# Patient Record
Sex: FEMALE | Race: WHITE | NOT HISPANIC OR LATINO | Employment: FULL TIME | URBAN - METROPOLITAN AREA
[De-identification: names, ages, dates, MRNs, and addresses within clinical notes are randomized per-mention and may not be internally consistent; named-entity substitution may affect disease eponyms.]

---

## 2017-01-05 ENCOUNTER — OFFICE VISIT (OUTPATIENT)
Dept: FAMILY MEDICINE | Facility: CLINIC | Age: 57
End: 2017-01-05
Payer: COMMERCIAL

## 2017-01-05 VITALS
WEIGHT: 224 LBS | HEIGHT: 67 IN | TEMPERATURE: 97.4 F | SYSTOLIC BLOOD PRESSURE: 132 MMHG | DIASTOLIC BLOOD PRESSURE: 84 MMHG | HEART RATE: 59 BPM | BODY MASS INDEX: 35.16 KG/M2

## 2017-01-05 DIAGNOSIS — J01.01 ACUTE RECURRENT MAXILLARY SINUSITIS: ICD-10-CM

## 2017-01-05 DIAGNOSIS — L60.0 INGROWN NAIL: Primary | ICD-10-CM

## 2017-01-05 DIAGNOSIS — Z12.31 VISIT FOR SCREENING MAMMOGRAM: ICD-10-CM

## 2017-01-05 DIAGNOSIS — Z78.0 ASYMPTOMATIC POSTMENOPAUSAL STATUS: ICD-10-CM

## 2017-01-05 PROCEDURE — 11730 AVULSION NAIL PLATE SIMPLE 1: CPT | Performed by: FAMILY MEDICINE

## 2017-01-05 PROCEDURE — 99212 OFFICE O/P EST SF 10 MIN: CPT | Mod: 25 | Performed by: FAMILY MEDICINE

## 2017-01-05 NOTE — NURSING NOTE
"Chief Complaint   Patient presents with     Derm Problem     right toe      Sinus Problem       Initial /84 mmHg  Pulse 59  Temp(Src) 97.4  F (36.3  C) (Oral)  Ht 5' 7\" (1.702 m)  Wt 224 lb (101.606 kg)  BMI 35.08 kg/m2  Breastfeeding? No Estimated body mass index is 35.08 kg/(m^2) as calculated from the following:    Height as of this encounter: 5' 7\" (1.702 m).    Weight as of this encounter: 224 lb (101.606 kg).  BP completed using cuff size: large    Health maintenance- mammogram order in. Pt aware.    Esteban Soria CMA          "

## 2017-01-05 NOTE — PROGRESS NOTES
"  SUBJECTIVE:                                                    Lizzie Chow is a 56 year old female who presents to clinic today for the following health issues:    Right great toe, ingrown nail. Has been seen twice for this. First time, told to soak, which she has been doing. Second time, treated with abx, continue to soak. Now she is being seen for the 3rd time for same issue. Continued pain, very tender.     Sinus pressure, cough for past 3 weeks. Woke this AM feeling better. No fevers for past week. No ear pain, upper jaw pain, sore throat. Using saline washes and cough med recommended by Pharmacist.       Problem list and histories reviewed & adjusted, as indicated.  Additional history: none    Problem list, Medication list, Allergies, and Medical/Social/Surgical histories reviewed in EPIC and updated as appropriate.    ROS:  Constitutional, HEENT, cardiovascular, pulmonary, gi and gu systems are negative, except as otherwise noted.    OBJECTIVE:                                                    /84 mmHg  Pulse 59  Temp(Src) 97.4  F (36.3  C) (Oral)  Ht 5' 7\" (1.702 m)  Wt 224 lb (101.606 kg)  BMI 35.08 kg/m2  Breastfeeding? No  Body mass index is 35.08 kg/(m^2).  GENERAL: healthy, alert and no distress  EYES: Eyes grossly normal to inspection, conjunctivae and sclerae normal  HENT: ear canals and TM's normal, nose and mouth without ulcers or lesions, cobblestoning posterior pharynx, no maxillary sinus pressure to percussion  NECK: no adenopathy  RESP: lungs clear to auscultation - no rales, rhonchi or wheezes  CV: regular rate and rhythm, normal S1 S2, no S3 or S4, no murmur, click or rub  SKIN: swelling and erythema, right lateral periungual skin, no apparent pockets of pus    Diagnostic Test Results:  none     Procedure - ingrown toenail removal    Ring block with 0.25% bupivicaine, successful. Nail loosened with spatula tool, clipped approx 3 mm from periungual skin, pulled from nail matrix. " Minimal bleeding. Treated with bacitracin, wrapped with gauze and Coban. Tolerated well.        ASSESSMENT/PLAN:                                                      1. Ingrown nail - Discussed proper care following. Keep covered for next 5 days.   - REMOVAL OF NAIL PLATE SIMPLE SINGLE    2. Asymptomatic postmenopausal status  - DEXA HIP/PELVIS/SPINE - Future; Future    3. Visit for screening mammogram  - *MA Screening Digital Bilateral; Future    4. Acute recurrent maxillary sinusitis - Overall, feeling better. Continue saline washes and cough med for sleep.       Gladys Solis MD  [facility]

## 2017-01-27 DIAGNOSIS — E78.2 MIXED HYPERLIPIDEMIA: Primary | ICD-10-CM

## 2017-01-27 RX ORDER — ATORVASTATIN CALCIUM 80 MG/1
80 TABLET, FILM COATED ORAL DAILY
Qty: 90 TABLET | Refills: 3 | Status: SHIPPED | OUTPATIENT
Start: 2017-01-27 | End: 2018-04-30

## 2017-02-01 ENCOUNTER — TELEPHONE (OUTPATIENT)
Dept: FAMILY MEDICINE | Facility: CLINIC | Age: 57
End: 2017-02-01

## 2017-02-01 NOTE — TELEPHONE ENCOUNTER
Panel Management Review      Patient has the following on her problem list:     Diabetes    ASA: Passed    Last A1C  A1C      6.1   11/3/2016  A1C      6.5   6/27/2016  A1C      6.4   9/1/2015  A1C      6.0   9/18/2013  A1C tested: Passed    Last LDL:    CHOL      130   9/1/2015  HDL       46   9/1/2015  LDL       42   9/1/2015  TRIG      208   9/1/2015  CHOLHDLRATIO      2.8   9/1/2015  No results found for this basename: nhdl    Is the patient on a Statin? YES             Is the patient on Aspirin? YES    Medications     HMG CoA Reductase Inhibitors    atorvastatin (LIPITOR) 80 MG tablet    Salicylates    aspirin EC 81 MG EC tablet          Last three blood pressure readings:  BP Readings from Last 3 Encounters:   01/05/17 132/84   12/08/16 130/59   11/03/16 128/82       Date of last diabetes office visit: 10-17-16 DM education.     Tobacco History:     History   Smoking status     Former Smoker -- 0.50 packs/day     Types: Cigarettes     Quit date: 01/01/2005   Smokeless tobacco     Never Used     Comment: less that half a pack             Composite cancer screening  Chart review shows that this patient is due/due soon for the following Mammogram  Summary:    Patient is due/failing the following:   MAMMOGRAM    Action needed:   Mammogram needed    Type of outreach:    recent ov and pt was informed of  needs    Questions for provider review:    None                                                                                                                                    Esteban Soria [facility]           Chart routed to none .

## 2017-06-07 ENCOUNTER — TELEPHONE (OUTPATIENT)
Dept: FAMILY MEDICINE | Facility: CLINIC | Age: 57
End: 2017-06-07

## 2017-06-07 NOTE — LETTER
[facility]          [address] [place] [address]                                                                                                       [phone]      Lizzie AMINTA Claudine  [address] [facility] [address]      Dear Lizzie,    Our records indicate that you may be due for preventative health care services.  Please make an appointment or call to set up the following tests as recommended.  If you have already had this testing done at another clinic please contact us to help us update our records to reflect the preventative care that you have had.    Breast cancer screen (mammogram) - Recommended every 1-2 years for women age 50 and older. Mammograms help detect breast cancer, which is the most common cancer among women in the [place]. You may need to start having mammograms earlier and more often if you have had breast cancer, breast problems, or have a family history of breast cancer.                                                                                                                                      Thank you for choosing [facility]. We appreciate the opportunity to serve you and look forward to supporting your healthcare needs in the future.    If you have any questions or concerns, please contact us at [phone]      Sincerely,           Gladys Solis MD

## 2017-06-07 NOTE — TELEPHONE ENCOUNTER
Panel Management Review      Patient has the following on her problem list:     Diabetes    ASA: Passed    Last A1C  Lab Results   Component Value Date    A1C 6.1 11/03/2016    A1C 6.5 06/27/2016    A1C 6.4 09/01/2015    A1C 6.0 09/18/2013     A1C tested: Passed    Last LDL:    Lab Results   Component Value Date    CHOL 130 09/01/2015     Lab Results   Component Value Date    HDL 46 09/01/2015     Lab Results   Component Value Date    LDL 42 09/01/2015     Lab Results   Component Value Date    TRIG 208 09/01/2015     Lab Results   Component Value Date    CHOLHDLRATIO 2.8 09/01/2015     No results found for: NHDL    Is the patient on a Statin? YES             Is the patient on Aspirin? YES    Medications     HMG CoA Reductase Inhibitors    atorvastatin (LIPITOR) 80 MG tablet    Salicylates    aspirin EC 81 MG EC tablet          Last three blood pressure readings:  BP Readings from Last 3 Encounters:   01/05/17 132/84   12/08/16 130/59   11/03/16 128/82       Date of last diabetes office visit: 6-     Tobacco History:     History   Smoking Status     Former Smoker     Packs/day: 0.50     Types: Cigarettes     Quit date: 1/1/2005   Smokeless Tobacco     Never Used     Comment: less that half a pack           Composite cancer screening  Chart review shows that this patient is due/due soon for the following . Mammogram  Summary:    Patient is due/failing the following:    MAMMOGRAM    Action needed:   Schedule  mammogram    Type of outreach:    Sent letter. as reminder. Pt has orders in    Questions for provider review:    None                                                                                                                                    Esteban Soria [facility]           Chart routed to letter out .

## 2017-08-05 ENCOUNTER — TELEPHONE (OUTPATIENT)
Dept: FAMILY MEDICINE | Facility: CLINIC | Age: 57
End: 2017-08-05

## 2017-09-12 NOTE — PROGRESS NOTES
"  SUBJECTIVE:   Lizzie Chow is a 56 year old female who presents to clinic today for the following health issues:      Having bilateral hip pain, most bothersome overnight - the hip affected is the one she's been sleeping on. When she changes position, she then has pain on that side.     No injuries to the hips. No low back pain. No pelvic pain.    Has some pain with ascending and descending stairs, otherwise hips are non-bothersome.      She reports she is also more fatigued lately. She feels this is because she has not been sleeping well due to her hip pain. She reports no snoring or apnea events.      Is overdue for diabetes visit. When I asked her why she hasn't made it in, she replies \"I don't know\". She feels her low-carb diet has been stable. She is on an ACE inhibitor and statin. She is taking aspirin daily.    She reports difficulty with smoking cessation. She is successful for a few days and then start smoking again. She finds the genetics is helpful and plans try this in the near future again.      Problem list and histories reviewed & adjusted, as indicated.  Additional history: none    Patient Active Problem List   Diagnosis     Flat feet     Family history of coronary artery disease     Fatty liver     Plantar fasciitis     Hypertension goal BP (blood pressure) < 140/90     NSTEMI (non-ST elevated myocardial infarction) (H)     CAD (coronary artery disease)     Ischemic cardiomyopathy     Tobacco abuse     Hyperlipidemia with target LDL less than 100     Menopausal state     Type 2 diabetes mellitus without complication (H)     Type 2 diabetes, HbA1c goal < 7% (H)     Advanced directives, counseling/discussion     Benign essential hypertension     Acquired hypothyroidism     Past Surgical History:   Procedure Laterality Date     CARDIAC SURGERY      stent     COLONOSCOPY N/A 12/8/2016    Procedure: COLONOSCOPY;  Surgeon: Ranjith Serrano MD;  Location: [facility]     HERNIORRHAPHY VENTRAL  10/8/2013    " Procedure: HERNIORRHAPHY VENTRAL;  Ventral hernia repair with mesh;  Surgeon: Alice Cordova MD;  Location: [facility] OR     NO HISTORY OF SURGERY         Social History   Substance Use Topics     Smoking status: Former Smoker     Packs/day: 0.50     Types: Cigarettes     Quit date: 1/1/2005     Smokeless tobacco: Never Used      Comment: less that half a pack     Alcohol use 0.0 oz/week     0 Standard drinks or equivalent per week      Comment: one time a week     Family History   Problem Relation Age of Onset     Family History Negative Father      alive 74     Family History Negative Sister      Family History Negative Sister      Family History Negative Brother      Family History Negative Brother      leukemia at age 18     Lipids Mother      alive 73     Breast Cancer No family hx of      Cancer - colorectal No family hx of      C.A.D. Mother 65     at age 65, heart attack, s/p stents placement     Hypertension Mother      DIABETES Mother      Cardiovascular Mother      DIABETES Sister      Obesity Brother      Obesity Maternal Grandfather      Obesity Paternal Grandfather      Hypertension Maternal Grandfather      Hypertension Paternal Grandfather      C.A.D. Maternal Grandmother 56     fatal MI             Reviewed and updated as needed this visit by clinical staff     Reviewed and updated as needed this visit by Provider         ROS:  Constitutional, HEENT, cardiovascular, pulmonary, gi and gu systems are negative, except as otherwise noted.      OBJECTIVE:   /84 (BP Location: Right arm, Patient Position: Sitting, Cuff Size: Adult Large)  Pulse 66  Temp 98.2  F (36.8  C) (Oral)  Wt 223 lb 8 oz (101.4 kg)  SpO2 96%  Breastfeeding? No  BMI 35.01 kg/m2  Body mass index is 35.01 kg/(m^2).  GENERAL: healthy, alert and no distress  RESP: lungs clear to auscultation - no rales, rhonchi or wheezes  CV: regular rate and rhythm, normal S1 S2, no S3 or S4, no murmur, click or rub, no peripheral edema  and peripheral pulses strong  MS: has mild ttp over the greater trochanter of the hips bilaterally, normal ROM with no pain  NEURO: Normal strength and tone, mentation intact and speech normal  PSYCH: mentation appears normal, affect normal/bright    Diagnostic Test Results:  Results for orders placed or performed in visit on 09/19/17 (from the past 24 hour(s))   Hemoglobin A1c   Result Value Ref Range    Hemoglobin A1C 6.3 (H) 4.3 - 6.0 %       ASSESSMENT/PLAN:     1. Trochanteric bursitis of both hips - likely diagnosis based on history, XR, and exam. Suggested sports med consult to discuss bursa injection, as I feel it would be technically difficult and more appropriate through them.     2. Hip pain, bilateral   - SPORTS MEDICINE REFERRAL    3. Type 2 diabetes mellitus without complication, without long-term current use of insulin (H) - continues to have good control, will await rest of labs  - TSH with free T4 reflex  - Hemoglobin A1c  - Basic metabolic panel; Future  - Albumin Random Urine Quantitative with Creat Ratio  - Lipid panel reflex to direct LDL    4. Encounter for screening mammogram for breast cancer   - *MA Screening Digital Bilateral; Future    5. Tobacco abuse - continues to struggle with smoking cessation, she will try chantix again in the near future    6. Benign essential hypertension - well controlled, continue current    7. Acquired hypothyroidism - will recheck today as she has hx of poor follow up    8. Hyperlipidemia with target LDL less than 100 - continue statin    Gladys Solis MD  [facility]

## 2017-09-14 ENCOUNTER — TELEPHONE (OUTPATIENT)
Dept: FAMILY MEDICINE | Facility: CLINIC | Age: 57
End: 2017-09-14

## 2017-09-14 NOTE — TELEPHONE ENCOUNTER
Panel Management Review      Patient has the following on her problem list:     Diabetes    ASA: Passed    Last A1C  Lab Results   Component Value Date    A1C 6.1 11/03/2016    A1C 6.5 06/27/2016    A1C 6.4 09/01/2015    A1C 6.0 09/18/2013     A1C tested: Passed    Last LDL:    Lab Results   Component Value Date    CHOL 130 09/01/2015     Lab Results   Component Value Date    HDL 46 09/01/2015     Lab Results   Component Value Date    LDL 42 09/01/2015     Lab Results   Component Value Date    TRIG 208 09/01/2015     Lab Results   Component Value Date    CHOLHDLRATIO 2.8 09/01/2015     No results found for: NHDL    Is the patient on a Statin? NO             Is the patient on Aspirin? YES    Medications     HMG CoA Reductase Inhibitors    atorvastatin (LIPITOR) 80 MG tablet    Salicylates    aspirin EC 81 MG EC tablet          Last three blood pressure readings:  BP Readings from Last 3 Encounters:   01/05/17 132/84   12/08/16 130/59   11/03/16 128/82       Date of last diabetes office visit: pt has appointment scheduled for next week             Composite cancer screening  Chart review shows that this patient is due/due soon for the following Mammogram  Summary:    Patient is due/failing the following:   MAMMOGRAM    Action needed:   Not at this time. Pt has appointment scheduled next week                                                                                   Esteban Soria [facility]           Chart routed to none .

## 2017-09-19 ENCOUNTER — OFFICE VISIT (OUTPATIENT)
Dept: FAMILY MEDICINE | Facility: CLINIC | Age: 57
End: 2017-09-19
Payer: COMMERCIAL

## 2017-09-19 ENCOUNTER — RADIANT APPOINTMENT (OUTPATIENT)
Dept: GENERAL RADIOLOGY | Facility: CLINIC | Age: 57
End: 2017-09-19
Attending: FAMILY MEDICINE
Payer: COMMERCIAL

## 2017-09-19 VITALS
SYSTOLIC BLOOD PRESSURE: 128 MMHG | OXYGEN SATURATION: 96 % | WEIGHT: 223.5 LBS | TEMPERATURE: 98.2 F | DIASTOLIC BLOOD PRESSURE: 84 MMHG | BODY MASS INDEX: 35.01 KG/M2 | HEART RATE: 66 BPM

## 2017-09-19 DIAGNOSIS — M70.62 TROCHANTERIC BURSITIS OF BOTH HIPS: Primary | ICD-10-CM

## 2017-09-19 DIAGNOSIS — M70.61 TROCHANTERIC BURSITIS OF BOTH HIPS: Primary | ICD-10-CM

## 2017-09-19 DIAGNOSIS — M25.552 HIP PAIN, BILATERAL: ICD-10-CM

## 2017-09-19 DIAGNOSIS — M25.551 HIP PAIN, BILATERAL: ICD-10-CM

## 2017-09-19 DIAGNOSIS — E03.9 ACQUIRED HYPOTHYROIDISM: ICD-10-CM

## 2017-09-19 DIAGNOSIS — Z12.31 ENCOUNTER FOR SCREENING MAMMOGRAM FOR BREAST CANCER: ICD-10-CM

## 2017-09-19 DIAGNOSIS — Z72.0 TOBACCO ABUSE: ICD-10-CM

## 2017-09-19 DIAGNOSIS — E11.9 TYPE 2 DIABETES MELLITUS WITHOUT COMPLICATION, WITHOUT LONG-TERM CURRENT USE OF INSULIN (H): ICD-10-CM

## 2017-09-19 DIAGNOSIS — I10 BENIGN ESSENTIAL HYPERTENSION: ICD-10-CM

## 2017-09-19 DIAGNOSIS — E78.5 HYPERLIPIDEMIA WITH TARGET LDL LESS THAN 100: ICD-10-CM

## 2017-09-19 LAB
ANION GAP SERPL CALCULATED.3IONS-SCNC: 7 MMOL/L (ref 3–14)
BUN SERPL-MCNC: 15 MG/DL (ref 7–30)
CALCIUM SERPL-MCNC: 9.4 MG/DL (ref 8.5–10.1)
CHLORIDE SERPL-SCNC: 101 MMOL/L (ref 94–109)
CHOLEST SERPL-MCNC: 134 MG/DL
CO2 SERPL-SCNC: 31 MMOL/L (ref 20–32)
CREAT SERPL-MCNC: 0.6 MG/DL (ref 0.52–1.04)
CREAT UR-MCNC: 85 MG/DL
GFR SERPL CREATININE-BSD FRML MDRD: >90 ML/MIN/1.7M2
GLUCOSE SERPL-MCNC: 128 MG/DL (ref 70–99)
HBA1C MFR BLD: 6.3 % (ref 4.3–6)
HDLC SERPL-MCNC: 52 MG/DL
LDLC SERPL CALC-MCNC: 43 MG/DL
MICROALBUMIN UR-MCNC: 7 MG/L
MICROALBUMIN/CREAT UR: 8.11 MG/G CR (ref 0–25)
NONHDLC SERPL-MCNC: 82 MG/DL
POTASSIUM SERPL-SCNC: 4.4 MMOL/L (ref 3.4–5.3)
SODIUM SERPL-SCNC: 139 MMOL/L (ref 133–144)
TRIGL SERPL-MCNC: 194 MG/DL
TSH SERPL DL<=0.005 MIU/L-ACNC: 2.65 MU/L (ref 0.4–4)

## 2017-09-19 PROCEDURE — 83036 HEMOGLOBIN GLYCOSYLATED A1C: CPT | Performed by: FAMILY MEDICINE

## 2017-09-19 PROCEDURE — 84443 ASSAY THYROID STIM HORMONE: CPT | Performed by: FAMILY MEDICINE

## 2017-09-19 PROCEDURE — 82043 UR ALBUMIN QUANTITATIVE: CPT | Performed by: FAMILY MEDICINE

## 2017-09-19 PROCEDURE — 99214 OFFICE O/P EST MOD 30 MIN: CPT | Performed by: FAMILY MEDICINE

## 2017-09-19 PROCEDURE — 80048 BASIC METABOLIC PNL TOTAL CA: CPT | Performed by: FAMILY MEDICINE

## 2017-09-19 PROCEDURE — 80061 LIPID PANEL: CPT | Performed by: FAMILY MEDICINE

## 2017-09-19 PROCEDURE — 36415 COLL VENOUS BLD VENIPUNCTURE: CPT | Performed by: FAMILY MEDICINE

## 2017-09-19 PROCEDURE — 73522 X-RAY EXAM HIPS BI 3-4 VIEWS: CPT

## 2017-09-19 NOTE — MR AVS SNAPSHOT
After Visit Summary   9/19/2017    Lizzie Chow    MRN: [id number]           Patient Information     Date Of Birth          1960        Visit Information        Provider Department      9/19/2017 8:30 AM Gladys Solis MD [facility]        Today's Diagnoses     Type 2 diabetes mellitus without complication, without long-term current use of insulin (H)    -  1    Hip pain, bilateral        Encounter for screening mammogram for breast cancer           Follow-ups after your visit        Additional Services     SPORTS MEDICINE REFERRAL       Your provider has referred you to:  [facility]: [facility]  [phone]   [email or web address]    Please be aware that coverage of these services is subject to the terms and limitations of your health insurance plan.  Call member services at your health plan with any benefit or coverage questions.      Please bring the following to your appointment:    >>   Any x-rays, CTs or MRIs which have been performed.  Contact the facility where they were done to arrange for  prior to your scheduled appointment.    >>   List of current medications   >>   This referral request   >>   Any documents/labs given to you for this referral                  Future tests that were ordered for you today     Open Future Orders        Priority Expected Expires Ordered    *MA Screening Digital Bilateral Routine  9/19/2018 9/19/2017    Basic metabolic panel Routine  9/19/2018 9/19/2017            Who to contact     If you have questions or need follow up information about today's clinic visit or your schedule please contact [facility] directly at [phone].  Normal or non-critical lab and imaging results will be communicated to you by MyChart, letter or phone within 4 business days after the clinic has received the  results. If you do not hear from us within 7 days, please contact the clinic through [employer] or phone. If you have a critical or abnormal lab result, we will notify you by phone as soon as possible.  Submit refill requests through [employer] or call your pharmacy and they will forward the refill request to us. Please allow 3 business days for your refill to be completed.          Additional Information About Your Visit        [employer] Information     [employer] gives you secure access to your electronic health record. If you see a primary care provider, you can also send messages to your care team and make appointments. If you have questions, please call your primary care clinic.  If you do not have a primary care provider, please call [phone] and they will assist you.        Care EveryWhere ID     This is your Care EveryWhere ID. This could be used by other organizations to access your [place] medical records  [id number]        Your Vitals Were     Pulse Temperature Pulse Oximetry Breastfeeding? BMI (Body Mass Index)       66 98.2  F (36.8  C) (Oral) 96% No 35.01 kg/m2        Blood Pressure from Last 3 Encounters:   09/19/17 128/84   01/05/17 132/84   12/08/16 130/59    Weight from Last 3 Encounters:   09/19/17 223 lb 8 oz (101.4 kg)   01/05/17 224 lb (101.6 kg)   12/08/16 221 lb (100.2 kg)              We Performed the Following     Albumin Random Urine Quantitative with Creat Ratio     Hemoglobin A1c     Lipid panel reflex to direct LDL     SPORTS MEDICINE REFERRAL     TSH with free T4 reflex        Primary Care Provider Office Phone # Fax #    Gladys Julián Solis [phone] [address]  [facility] [address]        Equal Access to Services     CLIVE ADAMS : [place] Minor, wajacky dunaway, qaybta kaalmago enriquez. So [facility] [phone].    ATENCIÓN: Si habla español, tiene a mcdonald disposición servicios gratuitos de asistencia  lingüística. Bryan al [phone].    We comply with applicable federal civil rights laws and [place] laws. We do not discriminate on the basis of race, color, national origin, age, disability sex, sexual orientation or gender identity.            Thank you!     Thank you for choosing [facility]  for your care. Our goal is always to provide you with excellent care. Hearing back from our patients is one way we can continue to improve our services. Please take a few minutes to complete the written survey that you may receive in the mail after your visit with us. Thank you!             Your Updated Medication List - Protect others around you: Learn how to safely use, store and throw away your medicines at www.disposemymeds.org.          This list is accurate as of: 9/19/17  9:30 AM.  Always use your most recent med list.                   Brand Name Dispense Instructions for use Diagnosis    aspirin 81 MG EC tablet     30 tablet    Take 1 tablet (81 mg) by mouth daily    CAD (coronary artery disease)       atorvastatin 80 MG tablet    LIPITOR    90 tablet    Take 1 tablet (80 mg) by mouth daily    Mixed hyperlipidemia       carvedilol 6.25 MG tablet    COREG    180 tablet    Take 1 tablet (6.25 mg) by mouth 2 times daily    Coronary artery disease involving native coronary artery with unstable angina pectoris (H)       * CHANTIX STARTING MONTH PROSPER 0.5 MG X 11 & 1 MG X 42 tablet   Generic drug:  varenicline     53 tablet    As directed on starting pack.    Tobacco abuse       * varenicline 1 MG tablet    CHANTIX CONTINUING MONTH PROSPER    60 tablet    Take 1 tablet (1 mg) by mouth 2 times daily    Tobacco abuse       levothyroxine 25 MCG tablet    SYNTHROID/LEVOTHROID    90 tablet    Take 1 tablet (25 mcg) by mouth daily    Acquired hypothyroidism       lisinopril 2.5 MG tablet    PRINIVIL/Zestril    90 tablet    Take 1 tablet (2.5 mg) by mouth daily    Benign essential hypertension       nitroGLYcerin  0.4 MG sublingual tablet    NITROSTAT    25 tablet    Place 1 tablet (0.4 mg) under the tongue every 5 minutes as needed for chest pain    Coronary artery disease of native heart with stable angina pectoris, unspecified vessel or lesion type (H)       * Notice:  This list has 2 medication(s) that are the same as other medications prescribed for you. Read the directions carefully, and ask your doctor or other care provider to review them with you.

## 2017-09-21 ENCOUNTER — TELEPHONE (OUTPATIENT)
Dept: FAMILY MEDICINE | Facility: CLINIC | Age: 57
End: 2017-09-21

## 2017-10-27 DIAGNOSIS — I10 BENIGN ESSENTIAL HYPERTENSION: ICD-10-CM

## 2017-10-27 RX ORDER — LISINOPRIL 2.5 MG/1
2.5 TABLET ORAL DAILY
Qty: 90 TABLET | Refills: 3 | Status: SHIPPED | OUTPATIENT
Start: 2017-10-27 | End: 2018-10-15

## 2017-10-27 NOTE — TELEPHONE ENCOUNTER
Pending Prescriptions:                       Disp   Refills    lisinopril (PRINIVIL/ZESTRIL) 2.5 MG tabl*90 tab*3            Sig: Take 1 tablet (2.5 mg) by mouth daily          Last Written Prescription Date: 11/03/2016  Last Fill Quantity: 90, # refills: 3  Last Office Visit with [facility] or [facility] prescribing provider: 09/19/2017    Next 5 appointments (look out 90 days)     Dec 12, 2017  9:45 AM CST   Return Visit with Dereje Castillo MD   [facility] [place] ([facility])    [address] [place] [id number] [phone]                   Potassium   Date Value Ref Range Status   09/19/2017 4.4 3.4 - 5.3 mmol/L Final     Creatinine   Date Value Ref Range Status   09/19/2017 0.60 0.52 - 1.04 mg/dL Final     BP Readings from Last 3 Encounters:   09/19/17 128/84   01/05/17 132/84   12/08/16 130/59     Tank CABRERAT

## 2017-10-27 NOTE — TELEPHONE ENCOUNTER
Prescription approved per [facility] Refill Protocol.  Timmy Robles RN, BSN

## 2017-11-09 DIAGNOSIS — I21.4 NSTEMI (NON-ST ELEVATED MYOCARDIAL INFARCTION) (H): Primary | ICD-10-CM

## 2017-11-25 DIAGNOSIS — E03.9 ACQUIRED HYPOTHYROIDISM: ICD-10-CM

## 2017-11-27 RX ORDER — LEVOTHYROXINE SODIUM 25 UG/1
25 TABLET ORAL DAILY
Qty: 90 TABLET | Refills: 3 | Status: SHIPPED | OUTPATIENT
Start: 2017-11-27 | End: 2018-12-19

## 2017-12-12 ENCOUNTER — OFFICE VISIT (OUTPATIENT)
Dept: CARDIOLOGY | Facility: CLINIC | Age: 57
End: 2017-12-12
Attending: INTERNAL MEDICINE
Payer: COMMERCIAL

## 2017-12-12 VITALS
SYSTOLIC BLOOD PRESSURE: 132 MMHG | BODY MASS INDEX: 35.03 KG/M2 | HEIGHT: 67 IN | HEART RATE: 60 BPM | DIASTOLIC BLOOD PRESSURE: 84 MMHG | WEIGHT: 223.2 LBS

## 2017-12-12 DIAGNOSIS — I25.110 CORONARY ARTERY DISEASE INVOLVING NATIVE CORONARY ARTERY WITH UNSTABLE ANGINA PECTORIS (H): ICD-10-CM

## 2017-12-12 DIAGNOSIS — I25.10 CORONARY ARTERY DISEASE INVOLVING NATIVE CORONARY ARTERY OF NATIVE HEART WITHOUT ANGINA PECTORIS: ICD-10-CM

## 2017-12-12 DIAGNOSIS — I10 BENIGN ESSENTIAL HYPERTENSION: ICD-10-CM

## 2017-12-12 DIAGNOSIS — I10 HYPERTENSION GOAL BP (BLOOD PRESSURE) < 140/90: ICD-10-CM

## 2017-12-12 DIAGNOSIS — I25.5 ISCHEMIC CARDIOMYOPATHY: Primary | ICD-10-CM

## 2017-12-12 DIAGNOSIS — I21.4 NSTEMI (NON-ST ELEVATED MYOCARDIAL INFARCTION) (H): ICD-10-CM

## 2017-12-12 DIAGNOSIS — E78.5 HYPERLIPIDEMIA WITH TARGET LDL LESS THAN 100: ICD-10-CM

## 2017-12-12 PROCEDURE — 99214 OFFICE O/P EST MOD 30 MIN: CPT | Performed by: INTERNAL MEDICINE

## 2017-12-12 RX ORDER — CARVEDILOL 6.25 MG/1
6.25 TABLET ORAL 2 TIMES DAILY
Qty: 180 TABLET | Refills: 3 | Status: SHIPPED | OUTPATIENT
Start: 2017-12-12 | End: 2018-12-19

## 2017-12-12 NOTE — PROGRESS NOTES
HPI and Plan:   See dictation(#[id number])    Orders Placed This Encounter   Procedures     Follow-Up with Cardiologist       No orders of the defined types were placed in this encounter.      There are no discontinued medications.      Encounter Diagnoses   Name Primary?     NSTEMI (non-ST elevated myocardial infarction) (H)      Ischemic cardiomyopathy Yes     Hypertension goal BP (blood pressure) < 140/90      Hyperlipidemia with target LDL less than 100      Benign essential hypertension      Coronary artery disease involving native coronary artery of native heart without angina pectoris        CURRENT MEDICATIONS:  Current Outpatient Prescriptions   Medication Sig Dispense Refill     levothyroxine (SYNTHROID/LEVOTHROID) 25 MCG tablet Take 1 tablet (25 mcg) by mouth daily 90 tablet 3     lisinopril (PRINIVIL/ZESTRIL) 2.5 MG tablet Take 1 tablet (2.5 mg) by mouth daily 90 tablet 3     atorvastatin (LIPITOR) 80 MG tablet Take 1 tablet (80 mg) by mouth daily 90 tablet 3     carvedilol (COREG) 6.25 MG tablet Take 1 tablet (6.25 mg) by mouth 2 times daily 180 tablet 3     nitroglycerin (NITROSTAT) 0.4 MG SL tablet Place 1 tablet (0.4 mg) under the tongue every 5 minutes as needed for chest pain 25 tablet 1     varenicline (CHANTIX CONTINUING MONTH PROSPER) 1 MG tablet Take 1 tablet (1 mg) by mouth 2 times daily 60 tablet 3     CHANTIX STARTING MONTH PROSPER 0.5 MG X 11 & 1 MG X 42 tablet As directed on starting pack. 53 tablet 0     aspirin EC 81 MG EC tablet Take 1 tablet (81 mg) by mouth daily 30 tablet 11       ALLERGIES     Allergies   Allergen Reactions     No Known Drug Allergies        PAST MEDICAL HISTORY:  Past Medical History:   Diagnosis Date     CAD (coronary artery disease) 6/15/14    MI, stent LAD      Chronic pain     both feet intermittently     Edema     furosemide     Elevated glucose      Fatty liver disease, nonalcoholic      HTN (hypertension)      Hyperlipidemia      Ischemic cardiomyopathy     EF 40-45%      Lumbago      Obesity (BMI 30-39.9)      Plantar fasciitis      Stress incontinence      Tobacco abuse      Umbilical hernia     has had surgical consult       PAST SURGICAL HISTORY:  Past Surgical History:   Procedure Laterality Date     CARDIAC SURGERY      stent     COLONOSCOPY N/A 12/8/2016    Procedure: COLONOSCOPY;  Surgeon: Ranjith Serrano MD;  Location: [facility]     HERNIORRHAPHY VENTRAL  10/8/2013    Procedure: HERNIORRHAPHY VENTRAL;  Ventral hernia repair with mesh;  Surgeon: Alice Cordova MD;  Location: [facility] OR     NO HISTORY OF SURGERY         FAMILY HISTORY:  Family History   Problem Relation Age of Onset     Lipids Mother      alive 73     C.A.D. Mother 65     at age 65, heart attack, s/p stents placement     Hypertension Mother      DIABETES Mother      Cardiovascular Mother      Family History Negative Father      alive 74     C.A.D. Maternal Grandmother 56     fatal MI     Obesity Maternal Grandfather      Hypertension Maternal Grandfather      Obesity Paternal Grandfather      Hypertension Paternal Grandfather      Family History Negative Sister      Family History Negative Sister      Family History Negative Brother      Family History Negative Brother      leukemia at age 18     DIABETES Sister      Obesity Brother      Breast Cancer No family hx of      Cancer - colorectal No family hx of        SOCIAL HISTORY:  Social History     Social History     Marital status:      Spouse name: N/A     Number of children: 2     Years of education: N/A     Occupational History     manager [employer]     Social History Main Topics     Smoking status: Former Smoker     Packs/day: 0.50     Types: Cigarettes     Quit date: 1/1/2005     Smokeless tobacco: Never Used      Comment: less that half a pack     Alcohol use 0.0 oz/week     0 Standard drinks or equivalent per week      Comment: one time a week     Drug use: No     Sexual activity: Yes     Partners: Male      Comment:  " with vasectomy     Other Topics Concern     Parent/Sibling W/ Cabg, Mi Or Angioplasty Before 65f 55m? 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       Review of Systems:  Skin:  Negative       Eyes:  Positive for glasses driving  ENT:  Positive for nasal congestion    Respiratory:  Negative       Cardiovascular:  Negative      Gastroenterology: Negative      Genitourinary:  Negative      Musculoskeletal:  Positive for arthritis;joint pain    Neurologic:  Positive for numbness or tingling of feet    Psychiatric:  Negative      Heme/Lymph/Imm:  Negative      Endocrine:  Positive for thyroid disorder      Physical Exam:  Vitals: /84  Pulse 60  Ht 1.702 m (5' 7\")  Wt 101.2 kg (223 lb 3.2 oz)  BMI 34.96 kg/m2    Constitutional:           Skin:             Head:           Eyes:           Lymph:      ENT:           Neck:           Respiratory:            Cardiac:                                                           GI:           Extremities and Muscular Skeletal:                 Neurological:           Psych:             CC  Dereje Castillo MD  [address]  [place] [id number]                  "

## 2017-12-12 NOTE — PROGRESS NOTES
REASON FOR CARDIOLOGY VISIT:  Followup CAD.      HISTORY OF PRESENT ILLNESS:  Ms. Chow is a very pleasant 56-year-old female who had non-STEMI in 06/2014 and underwent drug-eluting stent PCI of proximal LAD with 3.5 x 18 mm Resolute drug-eluting stent.  RCA and circumflex were normal, LVEF was 40%-45% at that time with mid to distal anterior wall and apical wall motion abnormalities with subsequent echocardiogram showing improvement in LVEF to 55%.  She has other comorbidities of hypertension, dyslipidemia and history of tobacco abuse.  Today, she is coming for routine followup.  The patient tells me that she has not had any tobacco in the last 4 weeks.  She is using Chantix intermittently to help her stay away from tobacco.  This summer she did biking fairly often, 4 times a week for about an hour each time.  No chest discomfort or shortness of breath with physical activity.  Recently she was given a Fitbit by her daughter, and she is doing yoga again.  No symptoms either at rest or with physical activity.  Lipid panel checked in September this year shows LDL well controlled at 43, HDL is improved from 46 to 52.  Kidney functions were normal.        MEDICATIONS:  She is presently on:   1.  Baby aspirin   2.  Lipitor 80 mg daily.   3.  Lisinopril 2.5 mg daily.   4.  Carvedilol 6.25 mg b.i.d.      PHYSICAL EXAMINATION:   VITAL SIGNS:  Blood pressure 132/84, heart rate 60 regular, weight 223 pounds, BMI 35.03.   GENERAL:  The patient appears pleasant, comfortable.   NECK:  Normal JVP, no bruit.   CARDIOVASCULAR SYSTEM:  S1, S2 normal, no murmur, rub or gallop.   RESPIRATORY SYSTEM:  Clear to auscultation bilaterally.   GASTROINTESTINAL SYSTEM:  Abdomen soft, nontender.   EXTREMITIES:  No pitting pedal edema.   NEUROLOGICAL:  Alert, oriented x3.   PSYCHIATRIC:  Normal affect.   SKIN:  No obvious rash.   HEENT:  No pallor or icterus.      IMPRESSION AND PLAN:  A very pleasant 56-year-old female with history of CAD,  hypertension, dyslipidemia.  Overall cardiac status-wise she is doing well without any symptoms of angina or congestive heart failure.  Cardiac auscultation was benign today.  She is on appropriate CAD medical regimen therapy of aspirin, high-intensity statin, beta blocker and ACE inhibitor.  LDL and blood pressure are both well controlled.  I again had a long discussion with the patient regarding the importance of staying away from tobacco and commended her from not using any tobacco for the last 4 weeks.   If she continues to feel well, we can see her back in 1 year, sooner if any change in clinical status.         RAH CHOUDHARY MD             D: 2017 10:29   T: 2017 11:16   MT: DENA      Name:     PROMISE MAY   MRN:      [id number]        Account:      [id number]   :      1960           Service Date: 2017      Document: [id number]

## 2017-12-12 NOTE — MR AVS SNAPSHOT
After Visit Summary   12/12/2017    Lizzie Chow    MRN: [id number]           Patient Information     Date Of Birth          1960        Visit Information        Provider Department      12/12/2017 9:45 AM Dereje Castillo MD [facility]        Today's Diagnoses     Ischemic cardiomyopathy    -  1    NSTEMI (non-ST elevated myocardial infarction) (H)        Hypertension goal BP (blood pressure) < 140/90        Hyperlipidemia with target LDL less than 100        Benign essential hypertension        Coronary artery disease involving native coronary artery of native heart without angina pectoris           Follow-ups after your visit        Additional Services     Follow-Up with Cardiologist                 Future tests that were ordered for you today     Open Future Orders        Priority Expected Expires Ordered    Follow-Up with Cardiologist Routine 12/12/2018 12/13/2018 12/12/2017            Who to contact     If you have questions or need follow up information about today's clinic visit or your schedule please contact [facility] directly at [phone].  Normal or non-critical lab and imaging results will be communicated to you by ScaleArchart, letter or phone within 4 business days after the clinic has received the results. If you do not hear from us within 7 days, please contact the clinic through GinzaMetricst or phone. If you have a critical or abnormal lab result, we will notify you by phone as soon as possible.  Submit refill requests through [employer] Decor or call your pharmacy and they will forward the refill request to us. Please allow 3 business days for your refill to be completed.          Additional Information About Your Visit        MyChart Information     [employer] gives you secure access to your electronic health record. If you see a primary care provider, you can also send messages to your care team and make appointments.  "If you have questions, please call your primary care clinic.  If you do not have a primary care provider, please call [phone] and they will assist you.        Care EveryWhere ID     This is your Care EveryWhere ID. This could be used by other organizations to access your [facility] medical records  [id number]        Your Vitals Were     Pulse Height BMI (Body Mass Index)             60 1.702 m (5' 7\") 34.96 kg/m2          Blood Pressure from Last 3 Encounters:   12/12/17 132/84   09/19/17 128/84   01/05/17 132/84    Weight from Last 3 Encounters:   12/12/17 101.2 kg (223 lb 3.2 oz)   09/19/17 101.4 kg (223 lb 8 oz)   01/05/17 101.6 kg (224 lb)              We Performed the Following     Follow-Up with Cardiologist        Primary Care Provider Office Phone # Fax #    Gladys Solis [phone] [address] [facility] [address]        Equal Access to Services     [facility]: Hadii aad ku hadasho Soomaali, waaxda luqadaha, qaybta kaalmada adeegyada, waxay mady hayjohn rios . So [facility] [phone].    ATENCIÓN: Si habla español, tiene a mcdonald disposición servicios gratuitos de asistencia lingüística. Llame al [phone].    We comply with applicable federal civil rights laws and [place] laws. We do not discriminate on the basis of race, color, national origin, age, disability, sex, sexual orientation, or gender identity.            Thank you!     Thank you for choosing [facility]  for your care. Our goal is always to provide you with excellent care. Hearing back from our patients is one way we can continue to improve our services. Please take a few minutes to complete the written survey that you may receive in the mail after your visit with us. Thank you!             Your Updated Medication List - Protect others around you: Learn how to safely use, store and throw away your medicines at www.disposemymeds.org.          This " list is accurate as of: 12/12/17 10:20 AM.  Always use your most recent med list.                   Brand Name Dispense Instructions for use Diagnosis    aspirin 81 MG EC tablet     30 tablet    Take 1 tablet (81 mg) by mouth daily    CAD (coronary artery disease)       atorvastatin 80 MG tablet    LIPITOR    90 tablet    Take 1 tablet (80 mg) by mouth daily    Mixed hyperlipidemia       carvedilol 6.25 MG tablet    COREG    180 tablet    Take 1 tablet (6.25 mg) by mouth 2 times daily    Coronary artery disease involving native coronary artery with unstable angina pectoris (H)       * CHANTIX STARTING MONTH PROSPER 0.5 MG X 11 & 1 MG X 42 tablet   Generic drug:  varenicline     53 tablet    As directed on starting pack.    Tobacco abuse       * varenicline 1 MG tablet    CHANTIX CONTINUING MONTH PROSPER    60 tablet    Take 1 tablet (1 mg) by mouth 2 times daily    Tobacco abuse       levothyroxine 25 MCG tablet    SYNTHROID/LEVOTHROID    90 tablet    Take 1 tablet (25 mcg) by mouth daily    Acquired hypothyroidism       lisinopril 2.5 MG tablet    PRINIVIL/Zestril    90 tablet    Take 1 tablet (2.5 mg) by mouth daily    Benign essential hypertension       nitroGLYcerin 0.4 MG sublingual tablet    NITROSTAT    25 tablet    Place 1 tablet (0.4 mg) under the tongue every 5 minutes as needed for chest pain    Coronary artery disease of native heart with stable angina pectoris, unspecified vessel or lesion type (H)       * Notice:  This list has 2 medication(s) that are the same as other medications prescribed for you. Read the directions carefully, and ask your doctor or other care provider to review them with you.

## 2018-01-03 ENCOUNTER — TELEPHONE (OUTPATIENT)
Dept: FAMILY MEDICINE | Facility: CLINIC | Age: 58
End: 2018-01-03

## 2018-01-03 NOTE — TELEPHONE ENCOUNTER
Panel Management Review      Patient has the following on her problem list:     Diabetes    ASA: Passed    Last A1C  Lab Results   Component Value Date    A1C 6.3 09/19/2017    A1C 6.1 11/03/2016    A1C 6.5 06/27/2016    A1C 6.4 09/01/2015    A1C 6.0 09/18/2013     A1C tested: Passed    Last LDL:    Lab Results   Component Value Date    CHOL 134 09/19/2017     Lab Results   Component Value Date    HDL 52 09/19/2017     Lab Results   Component Value Date    LDL 43 09/19/2017     Lab Results   Component Value Date    TRIG 194 09/19/2017     Lab Results   Component Value Date    CHOLHDLRATIO 2.8 09/01/2015     Lab Results   Component Value Date    NHDL 82 09/19/2017       Is the patient on a Statin? YES             Is the patient on Aspirin? YES    Medications     HMG CoA Reductase Inhibitors    atorvastatin (LIPITOR) 80 MG tablet    Salicylates    aspirin EC 81 MG EC tablet          Last three blood pressure readings:  BP Readings from Last 3 Encounters:   12/12/17 132/84   09/19/17 128/84   01/05/17 132/84       Date of last diabetes office visit: 6-28-16     Tobacco History:     History   Smoking Status     Former Smoker     Packs/day: 0.50     Types: Cigarettes     Quit date: 1/1/2005   Smokeless Tobacco     Never Used     Comment: less that half a pack             Composite cancer screening  Chart review shows that this patient is due/due soon for the following Mammogram  Summary:    Patient is due/failing the following:   MAMMOGRAM    Action needed:   Mammogram needed.     Type of outreach:    Phone, spoke to patient.  states that she had mammogram scheduled couple months ago and had to cancel it. states she is at work and aware mammogram due    Questions for provider review:    None                                                                                                                                    Esteban Soria [facility]           Chart routed to none spoke to pt.  .

## 2018-03-23 ENCOUNTER — OFFICE VISIT (OUTPATIENT)
Dept: FAMILY MEDICINE | Facility: CLINIC | Age: 58
End: 2018-03-23
Payer: COMMERCIAL

## 2018-03-23 ENCOUNTER — TELEPHONE (OUTPATIENT)
Dept: FAMILY MEDICINE | Facility: CLINIC | Age: 58
End: 2018-03-23

## 2018-03-23 ENCOUNTER — MYC MEDICAL ADVICE (OUTPATIENT)
Dept: FAMILY MEDICINE | Facility: CLINIC | Age: 58
End: 2018-03-23

## 2018-03-23 VITALS
OXYGEN SATURATION: 96 % | HEART RATE: 68 BPM | HEIGHT: 67 IN | WEIGHT: 218 LBS | SYSTOLIC BLOOD PRESSURE: 134 MMHG | BODY MASS INDEX: 34.21 KG/M2 | DIASTOLIC BLOOD PRESSURE: 86 MMHG | TEMPERATURE: 98.1 F

## 2018-03-23 DIAGNOSIS — E11.9 TYPE 2 DIABETES MELLITUS WITHOUT COMPLICATION, WITHOUT LONG-TERM CURRENT USE OF INSULIN (H): ICD-10-CM

## 2018-03-23 DIAGNOSIS — I10 BENIGN ESSENTIAL HYPERTENSION: ICD-10-CM

## 2018-03-23 DIAGNOSIS — J20.9 ACUTE BRONCHITIS WITH SYMPTOMS > 10 DAYS: Primary | ICD-10-CM

## 2018-03-23 DIAGNOSIS — R05.9 COUGH: Primary | ICD-10-CM

## 2018-03-23 DIAGNOSIS — E78.5 HYPERLIPIDEMIA WITH TARGET LDL LESS THAN 100: ICD-10-CM

## 2018-03-23 LAB — HBA1C MFR BLD: 6.5 % (ref 4.3–6)

## 2018-03-23 PROCEDURE — 83036 HEMOGLOBIN GLYCOSYLATED A1C: CPT | Performed by: FAMILY MEDICINE

## 2018-03-23 PROCEDURE — 36415 COLL VENOUS BLD VENIPUNCTURE: CPT | Performed by: FAMILY MEDICINE

## 2018-03-23 PROCEDURE — 99214 OFFICE O/P EST MOD 30 MIN: CPT | Performed by: FAMILY MEDICINE

## 2018-03-23 RX ORDER — DOXYCYCLINE 100 MG/1
100 CAPSULE ORAL 2 TIMES DAILY
Qty: 14 CAPSULE | Refills: 0 | Status: SHIPPED | OUTPATIENT
Start: 2018-03-23 | End: 2019-02-19

## 2018-03-23 RX ORDER — CODEINE PHOSPHATE AND GUAIFENESIN 10; 100 MG/5ML; MG/5ML
1 SOLUTION ORAL EVERY 4 HOURS PRN
Qty: 120 ML | Refills: 0 | Status: SHIPPED | OUTPATIENT
Start: 2018-03-23 | End: 2018-07-18

## 2018-03-23 NOTE — NURSING NOTE
"Chief Complaint   Patient presents with     URI       Initial /86 (BP Location: Right arm, Patient Position: Sitting, Cuff Size: Adult Large)  Pulse 68  Temp 98.1  F (36.7  C) (Oral)  Ht 5' 7\" (1.702 m)  Wt 218 lb (98.9 kg)  SpO2 96%  Breastfeeding? No  BMI 34.14 kg/m2 Estimated body mass index is 34.14 kg/(m^2) as calculated from the following:    Height as of this encounter: 5' 7\" (1.702 m).    Weight as of this encounter: 218 lb (98.9 kg).  Medication Reconciliation: complete     Health maintenance- mammogram due pt reminded.    Esteban Soria CMA             "

## 2018-03-23 NOTE — PROGRESS NOTES
SUBJECTIVE:   Lizzie Chow is a 57 year old female who presents to clinic today for the following health issues:      RESPIRATORY SYMPTOMS      Duration: almost 2 weeks    Description  cough and foggy feeling, dizzy. Fatigue. Temp last week    Severity: moderate    Accompanying signs and symptoms: None    History (predisposing factors):  none    Precipitating or alleviating factors: hard to sleep    Therapies tried and outcome:  Delsym made her shaky      More SOB since illness. Some wheezing.         Diet controlled diabetes. Previously good control. Hasn't been checked in the past 6 months. Taking statin and ACEI.    Lab Results   Component Value Date    A1C 6.3 09/19/2017    A1C 6.1 11/03/2016    A1C 6.5 06/27/2016    A1C 6.4 09/01/2015    A1C 6.0 09/18/2013           Problem list and histories reviewed & adjusted, as indicated.  Additional history: none    Patient Active Problem List   Diagnosis     Former smoker     Flat feet     Family history of coronary artery disease     Fatty liver     Plantar fasciitis     Hypertension goal BP (blood pressure) < 140/90     NSTEMI (non-ST elevated myocardial infarction) (H)     CAD (coronary artery disease)     Ischemic cardiomyopathy     Hyperlipidemia with target LDL less than 100     Menopausal state     Type 2 diabetes mellitus without complication (H)     Type 2 diabetes, HbA1c goal < 7% (H)     Advanced directives, counseling/discussion     Benign essential hypertension     Acquired hypothyroidism     Past Surgical History:   Procedure Laterality Date     CARDIAC SURGERY      stent     COLONOSCOPY N/A 12/8/2016    Procedure: COLONOSCOPY;  Surgeon: Ranjith Serrano MD;  Location:  GI     HERNIORRHAPHY VENTRAL  10/8/2013    Procedure: HERNIORRHAPHY VENTRAL;  Ventral hernia repair with mesh;  Surgeon: Alice Cordova MD;  Location:  OR     NO HISTORY OF SURGERY         Social History   Substance Use Topics     Smoking status: Former Smoker     Packs/day:  "0.50     Types: Cigarettes     Quit date: 1/1/2005     Smokeless tobacco: Never Used      Comment: less that half a pack     Alcohol use 0.0 oz/week     0 Standard drinks or equivalent per week      Comment: one time a week     Family History   Problem Relation Age of Onset     Lipids Mother      alive 73     C.A.D. Mother 65     at age 65, heart attack, s/p stents placement     Hypertension Mother      DIABETES Mother      Cardiovascular Mother      Family History Negative Father      alive 74     C.A.D. Maternal Grandmother 56     fatal MI     Obesity Maternal Grandfather      Hypertension Maternal Grandfather      Obesity Paternal Grandfather      Hypertension Paternal Grandfather      Family History Negative Sister      Family History Negative Sister      Family History Negative Brother      Family History Negative Brother      leukemia at age 18     DIABETES Sister      Obesity Brother      Breast Cancer No family hx of      Cancer - colorectal No family hx of            Reviewed and updated as needed this visit by clinical staff       Reviewed and updated as needed this visit by Provider         ROS:  Constitutional, HEENT, cardiovascular, pulmonary, gi and gu systems are negative, except as otherwise noted.    OBJECTIVE:     /86 (BP Location: Right arm, Patient Position: Sitting, Cuff Size: Adult Large)  Pulse 68  Temp 98.1  F (36.7  C) (Oral)  Ht 5' 7\" (1.702 m)  Wt 218 lb (98.9 kg)  SpO2 96%  Breastfeeding? No  BMI 34.14 kg/m2  Body mass index is 34.14 kg/(m^2).  GENERAL: healthy, alert and no distress  HENT: serous effusion bilateral middle ears  NECK: no adenopathy  RESP: good air entry, diffuse wheezing with transmitted upper respiratory rhonchor  CV: regular rate and rhythm, normal S1 S2, no S3 or S4, no murmur, click or rub  PSYCH: mentation appears normal, affect normal/bright    Diagnostic Test Results:  Results for orders placed or performed in visit on 03/23/18   Hemoglobin A1c "   Result Value Ref Range    Hemoglobin A1C 6.5 (H) 4.3 - 6.0 %       ASSESSMENT/PLAN:     1. Acute bronchitis with symptoms > 10 days - will treat given length of symptoms and clinically significant lung exam today  - doxycycline monohydrate 100 MG capsule; Take 1 capsule (100 mg) by mouth 2 times daily for 7 days  Dispense: 14 capsule; Refill: 0  - guaiFENesin-codeine (ROBITUSSIN AC) 100-10 MG/5ML SOLN solution; Take 5 mLs by mouth every 4 hours as needed for cough  Dispense: 120 mL; Refill: 0    2. Type 2 diabetes mellitus without complication, without long-term current use of insulin (H) - control continues to worsen but still in a good range. Advised 3 month follow up, work on cutting sugars and carbs better.   - Hemoglobin A1c    3. Benign essential hypertension - in range, continue current    4. Hyperlipidemia with target LDL less than 100 - on statin, last checked in range  Lab Results   Component Value Date    CHOL 134 09/19/2017     Lab Results   Component Value Date    HDL 52 09/19/2017     Lab Results   Component Value Date    LDL 43 09/19/2017     Lab Results   Component Value Date    TRIG 194 09/19/2017     Lab Results   Component Value Date    CHOLHDLRATIO 2.8 09/01/2015       3 month DM visit    Gladys Solis MD  [facility]

## 2018-03-23 NOTE — MR AVS SNAPSHOT
After Visit Summary   3/23/2018    Lizzie Chow    MRN: [id number]           Patient Information     Date Of Birth          1960        Visit Information        Provider Department      3/23/2018 11:40 AM Gladys Solis MD [facility]        Today's Diagnoses     Acute bronchitis with symptoms > 10 days    -  1    Type 2 diabetes mellitus without complication, without long-term current use of insulin (H)        Benign essential hypertension        Hyperlipidemia with target LDL less than 100           Follow-ups after your visit        Who to contact     If you have questions or need follow up information about today's clinic visit or your schedule please contact [facility] directly at [phone].  Normal or non-critical lab and imaging results will be communicated to you by MyChart, letter or phone within 4 business days after the clinic has received the results. If you do not hear from us within 7 days, please contact the clinic through [email or web address] or phone. If you have a critical or abnormal lab result, we will notify you by phone as soon as possible.  Submit refill requests through [employer] or call your pharmacy and they will forward the refill request to us. Please allow 3 business days for your refill to be completed.          Additional Information About Your Visit        MyChart Information     [employer] gives you secure access to your electronic health record. If you see a primary care provider, you can also send messages to your care team and make appointments. If you have questions, please call your primary care clinic.  If you do not have a primary care provider, please call [phone] and they will assist you.        Care EveryWhere ID     This is your Care EveryWhere ID. This could be used by other organizations to access your [facility] medical records  [id number]        Your Vitals Were     Pulse Temperature Height Pulse Oximetry Breastfeeding?  "BMI (Body Mass Index)    68 98.1  F (36.7  C) (Oral) 5' 7\" (1.702 m) 96% No 34.14 kg/m2       Blood Pressure from Last 3 Encounters:   03/23/18 134/86   12/12/17 132/84   09/19/17 128/84    Weight from Last 3 Encounters:   03/23/18 218 lb (98.9 kg)   12/12/17 223 lb 3.2 oz (101.2 kg)   09/19/17 223 lb 8 oz (101.4 kg)              We Performed the Following     Hemoglobin A1c          Today's Medication Changes          These changes are accurate as of 3/23/18 12:20 PM.  If you have any questions, ask your nurse or doctor.               Start taking these medicines.        Dose/Directions    doxycycline monohydrate 100 MG capsule   Used for:  Acute bronchitis with symptoms > 10 days   Started by:  Gladys Solis MD        Dose:  100 mg   Take 1 capsule (100 mg) by mouth 2 times daily for 7 days   Quantity:  14 capsule   Refills:  0       guaiFENesin-codeine 100-10 MG/5ML Soln solution   Commonly known as:  ROBITUSSIN AC   Used for:  Acute bronchitis with symptoms > 10 days   Started by:  Gladys Solis MD        Dose:  1 tsp.   Take 5 mLs by mouth every 4 hours as needed for cough   Quantity:  120 mL   Refills:  0         Stop taking these medicines if you haven't already. Please contact your care team if you have questions.     CHANTIX STARTING MONTH PROSPER 0.5 MG X 11 & 1 MG X 42 tablet   Generic drug:  varenicline   Stopped by:  Gladys Solis MD           varenicline 1 MG tablet   Commonly known as:  CHANTIX CONTINUING MONTH PROSPER   Stopped by:  Gladys Solis MD                Where to get your medicines      These medications were sent to [facility] [id number] [place] [address] [facility] [address] [address] [facility], [facility] [address]     Phone:  [phone]     doxycycline monohydrate 100 MG capsule         Some of these will need a paper prescription and others can be bought over the counter.  Ask your nurse if you have questions.     Bring a paper " prescription for each of these medications     guaiFENesin-codeine 100-10 MG/5ML Soln solution                Primary Care Provider Office Phone # Fax #    Gladys Julián Solis [phone] [address]  [facility] [address]        Equal Access to Services     CLIVE ADAMS : Hadii aad ku hadasho Soomaali, waaxda luqadaha, qaybta kaalmada adeegyada, waxay idiin hayaan adeeg cami lasaira ralph. So [facility] [phone].    ATENCIÓN: Si habla español, tiene a mcdonald disposición servicios gratuitos de asistencia lingüística. [facility] [phone].    We comply with applicable federal civil rights laws and [place] laws. We do not discriminate on the basis of race, color, national origin, age, disability, sex, sexual orientation, or gender identity.            Thank you!     Thank you for choosing [facility]  for your care. Our goal is always to provide you with excellent care. Hearing back from our patients is one way we can continue to improve our services. Please take a few minutes to complete the written survey that you may receive in the mail after your visit with us. Thank you!             Your Updated Medication List - Protect others around you: Learn how to safely use, store and throw away your medicines at www.disposemymeds.org.          This list is accurate as of 3/23/18 12:20 PM.  Always use your most recent med list.                   Brand Name Dispense Instructions for use Diagnosis    aspirin 81 MG EC tablet     30 tablet    Take 1 tablet (81 mg) by mouth daily    CAD (coronary artery disease)       atorvastatin 80 MG tablet    LIPITOR    90 tablet    Take 1 tablet (80 mg) by mouth daily    Mixed hyperlipidemia       carvedilol 6.25 MG tablet    COREG    180 tablet    Take 1 tablet (6.25 mg) by mouth 2 times daily    Coronary artery disease involving native coronary artery with unstable angina pectoris (H)       doxycycline monohydrate 100 MG capsule     14 capsule    Take 1  capsule (100 mg) by mouth 2 times daily for 7 days    Acute bronchitis with symptoms > 10 days       guaiFENesin-codeine 100-10 MG/5ML Soln solution    ROBITUSSIN AC    120 mL    Take 5 mLs by mouth every 4 hours as needed for cough    Acute bronchitis with symptoms > 10 days       levothyroxine 25 MCG tablet    SYNTHROID/LEVOTHROID    90 tablet    Take 1 tablet (25 mcg) by mouth daily    Acquired hypothyroidism       lisinopril 2.5 MG tablet    PRINIVIL/Zestril    90 tablet    Take 1 tablet (2.5 mg) by mouth daily    Benign essential hypertension       nitroGLYcerin 0.4 MG sublingual tablet    NITROSTAT    25 tablet    Place 1 tablet (0.4 mg) under the tongue every 5 minutes as needed for chest pain    Coronary artery disease of native heart with stable angina pectoris, unspecified vessel or lesion type (H)

## 2018-03-27 RX ORDER — ALBUTEROL SULFATE 90 UG/1
2 AEROSOL, METERED RESPIRATORY (INHALATION) EVERY 4 HOURS PRN
Qty: 1 INHALER | Refills: 1 | Status: SHIPPED | OUTPATIENT
Start: 2018-03-27 | End: 2019-01-28

## 2018-04-30 DIAGNOSIS — E78.2 MIXED HYPERLIPIDEMIA: ICD-10-CM

## 2018-04-30 RX ORDER — ATORVASTATIN CALCIUM 80 MG/1
80 TABLET, FILM COATED ORAL DAILY
Qty: 90 TABLET | Refills: 3 | Status: SHIPPED | OUTPATIENT
Start: 2018-04-30 | End: 2019-03-20

## 2018-07-18 ENCOUNTER — OFFICE VISIT (OUTPATIENT)
Dept: FAMILY MEDICINE | Facility: CLINIC | Age: 58
End: 2018-07-18
Payer: COMMERCIAL

## 2018-07-18 VITALS
HEIGHT: 67 IN | WEIGHT: 223 LBS | SYSTOLIC BLOOD PRESSURE: 136 MMHG | BODY MASS INDEX: 35 KG/M2 | DIASTOLIC BLOOD PRESSURE: 86 MMHG | TEMPERATURE: 98 F | HEART RATE: 76 BPM

## 2018-07-18 DIAGNOSIS — M77.11 LATERAL EPICONDYLITIS OF RIGHT ELBOW: Primary | ICD-10-CM

## 2018-07-18 PROCEDURE — 99213 OFFICE O/P EST LOW 20 MIN: CPT | Performed by: FAMILY MEDICINE

## 2018-07-18 NOTE — NURSING NOTE
"  Chief Complaint   Patient presents with     Musculoskeletal Problem       Initial /86 (BP Location: Right arm, Patient Position: Sitting, Cuff Size: Adult Large)  Pulse 76  Temp 98  F (36.7  C) (Oral)  Ht 5' 7\" (1.702 m)  Wt 223 lb (101.2 kg)  Breastfeeding? No  BMI 34.93 kg/m2 Estimated body mass index is 34.93 kg/(m^2) as calculated from the following:    Height as of this encounter: 5' 7\" (1.702 m).    Weight as of this encounter: 223 lb (101.2 kg).  Medication Reconciliation: complete      Health Maintenance addressed:  Mammogram pt aware.    Esteban Soria CMA        "

## 2018-07-18 NOTE — PROGRESS NOTES
SUBJECTIVE:   Lizzie Chow is a 57 year old female who presents to clinic today for the following health issues:      Musculoskeletal problem/pain      Duration: one week     Description  Location: right arm and elbow    Intensity:  7/10    Accompanying signs and symptoms: some edema    History  Previous similar problem: no   Previous evaluation:  none    Precipitating or alleviating factors:  Trauma or overuse: no   Aggravating factors include: raising the arm. grasping    Therapies tried and outcome: ice and acetaminophen helps some      Uses her mouse right handed. No other activities that she can think of that could have caused pain. No injuries.       Problem list and histories reviewed & adjusted, as indicated.  Additional history: none    Patient Active Problem List   Diagnosis     Former smoker     Flat feet     Family history of coronary artery disease     Fatty liver     Plantar fasciitis     Hypertension goal BP (blood pressure) < 140/90     NSTEMI (non-ST elevated myocardial infarction) (H)     CAD (coronary artery disease)     Ischemic cardiomyopathy     Hyperlipidemia with target LDL less than 100     Menopausal state     Type 2 diabetes mellitus without complication (H)     Type 2 diabetes, HbA1c goal < 7% (H)     Advanced directives, counseling/discussion     Benign essential hypertension     Acquired hypothyroidism     Past Surgical History:   Procedure Laterality Date     CARDIAC SURGERY      stent     COLONOSCOPY N/A 12/8/2016    Procedure: COLONOSCOPY;  Surgeon: Ranjith Serrano MD;  Location:  GI     HERNIORRHAPHY VENTRAL  10/8/2013    Procedure: HERNIORRHAPHY VENTRAL;  Ventral hernia repair with mesh;  Surgeon: Alice Cordova MD;  Location: [facility] OR     NO HISTORY OF SURGERY         Social History   Substance Use Topics     Smoking status: Former Smoker     Packs/day: 0.50     Types: Cigarettes     Quit date: 1/1/2005     Smokeless tobacco: Never Used      Comment: less that half a  "pack     Alcohol use 0.0 oz/week     0 Standard drinks or equivalent per week      Comment: one time a week     Family History   Problem Relation Age of Onset     Lipids Mother      alive 73     C.A.D. Mother 65     at age 65, heart attack, s/p stents placement     Hypertension Mother      Diabetes Mother      Cardiovascular Mother      Family History Negative Father      alive 74     C.A.D. Maternal Grandmother 56     fatal MI     Obesity Maternal Grandfather      Hypertension Maternal Grandfather      Obesity Paternal Grandfather      Hypertension Paternal Grandfather      Family History Negative Sister      Family History Negative Sister      Family History Negative Brother      Family History Negative Brother      leukemia at age 18     Diabetes Sister      Obesity Brother      Breast Cancer No family hx of      Cancer - colorectal No family hx of            Reviewed and updated as needed this visit by clinical staff       Reviewed and updated as needed this visit by Provider         ROS:  Constitutional, HEENT, cardiovascular, pulmonary, gi and gu systems are negative, except as otherwise noted.    OBJECTIVE:     /86 (BP Location: Right arm, Patient Position: Sitting, Cuff Size: Adult Large)  Pulse 76  Temp 98  F (36.7  C) (Oral)  Ht 5' 7\" (1.702 m)  Wt 223 lb (101.2 kg)  Breastfeeding? No  BMI 34.93 kg/m2  Body mass index is 34.93 kg/(m^2).  GENERAL: healthy, alert and no distress  MSK: right arm - ttp over the lateral epicondyle, pain with resisted supination and wrist extension    Diagnostic Test Results:  none     ASSESSMENT/PLAN:     1. Lateral epicondylitis of right elbow - discussed diagnosis, reviewed getting a more ergonomic mouse for work, rest, wear strap, can consider PT if still having issues in the future.   - order for DME; Equipment being ordered: forearm strap  Dispense: 1 Units; Refill: 0    Gladys Solis MD  [facility]    "

## 2018-07-18 NOTE — MR AVS SNAPSHOT
After Visit Summary   7/18/2018    Lizzie Chow    MRN: [id number]           Patient Information     Date Of Birth          1960        Visit Information        Provider Department      7/18/2018 2:00 PM Gladys Solis MD [facility]        Today's Diagnoses     Lateral epicondylitis of right elbow    -  1       Follow-ups after your visit        Follow-up notes from your care team     Return in about 2 weeks (around 8/1/2018) for Diabetes Visit.      Your next 10 appointments already scheduled     Jul 25, 2018  7:20 AM CDT   [facility]ys [facility]is MD   [facility]    [address] [facility] [address] [phone]           Bring a current list of meds and any records pertaining to this visit. For Physicals, please bring immunization records and any forms needing to be filled out. Please arrive 10 minutes early to complete paperwork.              Who to contact     If you have questions or need follow up information about today's clinic visit or your schedule please contact [facility] directly at [phone].  Normal or non-critical lab and imaging results will be communicated to you by MyChart, letter or phone within 4 business days after the clinic has received the results. If you do not hear from us within 7 days, please contact the clinic through Launch[employer]art or phone. If you have a critical or abnormal lab result, we will notify you by phone as soon as possible.  Submit refill requests through [employer] or call your pharmacy and they will forward the refill request to us. Please allow 3 business days for your refill to be completed.          Additional Information About Your Visit        MyChart Information     [employer] gives you secure access to your electronic health record. If you see a primary care provider, you can also send messages to your care team and make appointments. If you have  "questions, please call your primary care clinic.  If you do not have a primary care provider, please call [phone] and they will assist you.        Care EveryWhere ID     This is your Care EveryWhere ID. This could be used by other organizations to access your [facility] medical records  [id number]        Your Vitals Were     Pulse Temperature Height Breastfeeding? BMI (Body Mass Index)       76 98  F (36.7  C) (Oral) 5' 7\" (1.702 m) No 34.93 kg/m2        Blood Pressure from Last 3 Encounters:   07/18/18 136/86   03/23/18 134/86   12/12/17 132/84    Weight from Last 3 Encounters:   07/18/18 223 lb (101.2 kg)   03/23/18 218 lb (98.9 kg)   12/12/17 223 lb 3.2 oz (101.2 kg)              Today, you had the following     No orders found for display         Today's Medication Changes          These changes are accurate as of 7/18/18  2:47 PM.  If you have any questions, ask your nurse or doctor.               Start taking these medicines.        Dose/Directions    order for DME   Used for:  Lateral epicondylitis of right elbow   Started by:  Gladys Solis MD        Equipment being ordered: forearm strap   Quantity:  1 Units   Refills:  0            Where to get your medicines      Some of these will need a paper prescription and others can be bought over the counter.  Ask your nurse if you have questions.     Bring a paper prescription for each of these medications     order for DME                Primary Care Provider Office Phone # Fax #    Gladys Solis [phone] [address]  [facility] [address]        Equal Access to Services     [facility] AH: Hadii calderon delgado hadasho Soshalom, waaxda luqadaha, qaybta kaalmada mago cervantes. So [facility] [phone].    ATENCIÓN: Si habla español, tiene a mcdonald disposición servicios gratuitos de asistencia lingüística. Llame al [phone].    We comply with applicable federal civil rights laws and [place] " laws. We do not discriminate on the basis of race, color, national origin, age, disability, sex, sexual orientation, or gender identity.            Thank you!     Thank you for choosing [facility]  for your care. Our goal is always to provide you with excellent care. Hearing back from our patients is one way we can continue to improve our services. Please take a few minutes to complete the written survey that you may receive in the mail after your visit with us. Thank you!             Your Updated Medication List - Protect others around you: Learn how to safely use, store and throw away your medicines at www.disposemymeds.org.          This list is accurate as of 7/18/18  2:47 PM.  Always use your most recent med list.                   Brand Name Dispense Instructions for use Diagnosis    albuterol 108 (90 Base) MCG/ACT Inhaler    PROAIR HFA/PROVENTIL HFA/VENTOLIN HFA    1 Inhaler    Inhale 2 puffs into the lungs every 4 hours as needed    Cough       aspirin 81 MG EC tablet     30 tablet    Take 1 tablet (81 mg) by mouth daily    CAD (coronary artery disease)       atorvastatin 80 MG tablet    LIPITOR    90 tablet    Take 1 tablet (80 mg) by mouth daily    Mixed hyperlipidemia       carvedilol 6.25 MG tablet    COREG    180 tablet    Take 1 tablet (6.25 mg) by mouth 2 times daily    Coronary artery disease involving native coronary artery with unstable angina pectoris (H)       levothyroxine 25 MCG tablet    SYNTHROID/LEVOTHROID    90 tablet    Take 1 tablet (25 mcg) by mouth daily    Acquired hypothyroidism       lisinopril 2.5 MG tablet    PRINIVIL/Zestril    90 tablet    Take 1 tablet (2.5 mg) by mouth daily    Benign essential hypertension       nitroGLYcerin 0.4 MG sublingual tablet    NITROSTAT    25 tablet    Place 1 tablet (0.4 mg) under the tongue every 5 minutes as needed for chest pain    Coronary artery disease of native heart with stable angina pectoris, unspecified vessel or lesion  type (H)       order for DME     1 Units    Equipment being ordered: forearm strap    Lateral epicondylitis of right elbow

## 2018-07-25 ENCOUNTER — OFFICE VISIT (OUTPATIENT)
Dept: FAMILY MEDICINE | Facility: CLINIC | Age: 58
End: 2018-07-25
Payer: COMMERCIAL

## 2018-07-25 VITALS
HEART RATE: 78 BPM | WEIGHT: 221 LBS | DIASTOLIC BLOOD PRESSURE: 86 MMHG | BODY MASS INDEX: 34.69 KG/M2 | HEIGHT: 67 IN | SYSTOLIC BLOOD PRESSURE: 130 MMHG | TEMPERATURE: 97.8 F

## 2018-07-25 DIAGNOSIS — I10 BENIGN ESSENTIAL HYPERTENSION: ICD-10-CM

## 2018-07-25 DIAGNOSIS — E78.5 HYPERLIPIDEMIA WITH TARGET LDL LESS THAN 100: ICD-10-CM

## 2018-07-25 DIAGNOSIS — E03.9 ACQUIRED HYPOTHYROIDISM: ICD-10-CM

## 2018-07-25 DIAGNOSIS — E11.9 TYPE 2 DIABETES MELLITUS WITHOUT COMPLICATION, WITHOUT LONG-TERM CURRENT USE OF INSULIN (H): Primary | ICD-10-CM

## 2018-07-25 LAB
ANION GAP SERPL CALCULATED.3IONS-SCNC: 8 MMOL/L (ref 3–14)
BUN SERPL-MCNC: 15 MG/DL (ref 7–30)
CALCIUM SERPL-MCNC: 8.8 MG/DL (ref 8.5–10.1)
CHLORIDE SERPL-SCNC: 105 MMOL/L (ref 94–109)
CHOLEST SERPL-MCNC: 129 MG/DL
CO2 SERPL-SCNC: 29 MMOL/L (ref 20–32)
CREAT SERPL-MCNC: 0.66 MG/DL (ref 0.52–1.04)
GFR SERPL CREATININE-BSD FRML MDRD: >90 ML/MIN/1.7M2
GLUCOSE SERPL-MCNC: 147 MG/DL (ref 70–99)
HBA1C MFR BLD: 6.5 % (ref 0–5.6)
HDLC SERPL-MCNC: 46 MG/DL
LDLC SERPL CALC-MCNC: 45 MG/DL
NONHDLC SERPL-MCNC: 83 MG/DL
POTASSIUM SERPL-SCNC: 4.3 MMOL/L (ref 3.4–5.3)
SODIUM SERPL-SCNC: 142 MMOL/L (ref 133–144)
T4 FREE SERPL-MCNC: 1 NG/DL (ref 0.76–1.46)
TRIGL SERPL-MCNC: 188 MG/DL
TSH SERPL DL<=0.005 MIU/L-ACNC: 4.46 MU/L (ref 0.4–4)

## 2018-07-25 PROCEDURE — 99207 C FOOT EXAM  NO CHARGE: CPT | Mod: 25 | Performed by: FAMILY MEDICINE

## 2018-07-25 PROCEDURE — 83036 HEMOGLOBIN GLYCOSYLATED A1C: CPT | Performed by: FAMILY MEDICINE

## 2018-07-25 PROCEDURE — 84443 ASSAY THYROID STIM HORMONE: CPT | Performed by: FAMILY MEDICINE

## 2018-07-25 PROCEDURE — 80061 LIPID PANEL: CPT | Performed by: FAMILY MEDICINE

## 2018-07-25 PROCEDURE — 80048 BASIC METABOLIC PNL TOTAL CA: CPT | Performed by: FAMILY MEDICINE

## 2018-07-25 PROCEDURE — 36415 COLL VENOUS BLD VENIPUNCTURE: CPT | Performed by: FAMILY MEDICINE

## 2018-07-25 PROCEDURE — 99214 OFFICE O/P EST MOD 30 MIN: CPT | Performed by: FAMILY MEDICINE

## 2018-07-25 PROCEDURE — 84439 ASSAY OF FREE THYROXINE: CPT | Performed by: FAMILY MEDICINE

## 2018-07-25 NOTE — PROGRESS NOTES
SUBJECTIVE:   Lizzie Chow is a 57 year old female who presents to clinic today for the following health issues:      Diabetes Follow-up      Patient is checking blood sugars: not at all    Diabetic concerns: None     Symptoms of hypoglycemia (low blood sugar): none     Paresthesias (numbness or burning in feet) or sores: No     Date of last diabetic eye exam:       Hyperlipidemia Follow-Up      Rate your low fat/cholesterol diet?: fair    Taking statin?  Yes, no muscle aches from statin    Other lipid medications/supplements?:  none    Hypertension Follow-up      Outpatient blood pressures are not being checked.    Low Salt Diet: low salt    BP Readings from Last 2 Encounters:   07/18/18 136/86   03/23/18 134/86     Hemoglobin A1C (%)   Date Value   03/23/2018 6.5 (H)   09/19/2017 6.3 (H)     LDL Cholesterol Calculated (mg/dL)   Date Value   09/19/2017 43   09/01/2015 42       Amount of exercise or physical activity: walking    Problems taking medications regularly: No    Medication side effects: none    Diet: low salt            Problem list and histories reviewed & adjusted, as indicated.  Additional history: as documented    Patient Active Problem List   Diagnosis     Former smoker     Flat feet     Family history of coronary artery disease     Fatty liver     Plantar fasciitis     Hypertension goal BP (blood pressure) < 140/90     NSTEMI (non-ST elevated myocardial infarction) (H)     CAD (coronary artery disease)     Ischemic cardiomyopathy     Hyperlipidemia with target LDL less than 100     Menopausal state     Type 2 diabetes, HbA1c goal < 7% (H)     Advanced directives, counseling/discussion     Benign essential hypertension     Acquired hypothyroidism     Type 2 diabetes mellitus without complication, without long-term current use of insulin (H)     Past Surgical History:   Procedure Laterality Date     CARDIAC SURGERY      stent     COLONOSCOPY N/A 12/8/2016    Procedure: COLONOSCOPY;  Surgeon:  "Ranjith Serrano MD;  Location: [facility] GI     HERNIORRHAPHY VENTRAL  10/8/2013    Procedure: HERNIORRHAPHY VENTRAL;  Ventral hernia repair with mesh;  Surgeon: Alice Cordova MD;  Location: [facility] OR     NO HISTORY OF SURGERY         Social History   Substance Use Topics     Smoking status: Former Smoker     Packs/day: 0.50     Types: Cigarettes     Quit date: 1/1/2005     Smokeless tobacco: Never Used      Comment: less that half a pack     Alcohol use 0.0 oz/week     0 Standard drinks or equivalent per week      Comment: one time a week     Family History   Problem Relation Age of Onset     Lipids Mother      alive 73     C.A.D. Mother 65     at age 65, heart attack, s/p stents placement     Hypertension Mother      Diabetes Mother      Cardiovascular Mother      Family History Negative Father      alive 74     C.A.D. Maternal Grandmother 56     fatal MI     Obesity Maternal Grandfather      Hypertension Maternal Grandfather      Obesity Paternal Grandfather      Hypertension Paternal Grandfather      Family History Negative Sister      Family History Negative Sister      Family History Negative Brother      Family History Negative Brother      leukemia at age 18     Diabetes Sister      Obesity Brother      Breast Cancer No family hx of      Cancer - colorectal No family hx of            Reviewed and updated as needed this visit by clinical staff       Reviewed and updated as needed this visit by Provider         ROS:  Constitutional, HEENT, cardiovascular, pulmonary, gi and gu systems are negative, except as otherwise noted.    OBJECTIVE:     /86 (BP Location: Right arm, Patient Position: Sitting, Cuff Size: Adult Large)  Pulse 78  Temp 97.8  F (36.6  C) (Oral)  Ht 5' 7\" (1.702 m)  Wt 221 lb (100.2 kg)  Breastfeeding? No  BMI 34.61 kg/m2  Body mass index is 34.61 kg/(m^2).  GENERAL: healthy, alert and no distress  RESP: lungs clear to auscultation - no rales, rhonchi or wheezes  CV: regular rate " and rhythm, normal S1 S2, no S3 or S4, no murmur, click or rub, no peripheral edema and peripheral pulses strong  Diabetic foot exam: normal DP and PT pulses, no trophic changes or ulcerative lesions and normal sensory exam    Diagnostic Test Results:  Results for orders placed or performed in visit on 07/25/18 (from the past 24 hour(s))   Hemoglobin A1c   Result Value Ref Range    Hemoglobin A1C 6.5 (H) 0 - 5.6 %       Lab Results   Component Value Date    A1C 6.5 07/25/2018    A1C 6.5 03/23/2018    A1C 6.3 09/19/2017    A1C 6.1 11/03/2016    A1C 6.5 06/27/2016       ASSESSMENT/PLAN:     1. Type 2 diabetes mellitus without complication, without long-term current use of insulin (H) - diet controlled, continues to be stable, recheck 3 months  - Hemoglobin A1c  - ADMIN 1st VACCINE  - TDAP, IM (10 - 64 YRS) - Adacel    2. Benign essential hypertension - in range, continue current  - Basic metabolic panel  (Ca, Cl, CO2, Creat, Gluc, K, Na, BUN)    3. Hyperlipidemia with target LDL less than 100 - previously stable, recheck today, continue statin  - Lipid panel reflex to direct LDL Fasting    4. Acquired hypothyroidism - recheck today  - TSH with free T4 reflex    Gladys Solis MD  [facility] [place]

## 2018-07-25 NOTE — NURSING NOTE
"  Chief Complaint   Patient presents with     Diabetes       Initial /86 (BP Location: Right arm, Patient Position: Sitting, Cuff Size: Adult Large)  Pulse 78  Temp 97.8  F (36.6  C) (Oral)  Ht 5' 7\" (1.702 m)  Wt 221 lb (100.2 kg)  Breastfeeding? No  BMI 34.61 kg/m2 Estimated body mass index is 34.61 kg/(m^2) as calculated from the following:    Height as of this encounter: 5' 7\" (1.702 m).    Weight as of this encounter: 221 lb (100.2 kg).  Medication Reconciliation: complete      Health Maintenance addressed:  Mammogram    Esteban Soria CMA          "

## 2018-08-31 ENCOUNTER — APPOINTMENT (OUTPATIENT)
Dept: GENERAL RADIOLOGY | Facility: CLINIC | Age: 58
End: 2018-08-31
Attending: EMERGENCY MEDICINE
Payer: COMMERCIAL

## 2018-08-31 ENCOUNTER — HOSPITAL ENCOUNTER (OUTPATIENT)
Facility: CLINIC | Age: 58
Setting detail: OBSERVATION
Discharge: HOME | End: 2018-09-01
Attending: EMERGENCY MEDICINE | Admitting: HOSPITALIST
Payer: COMMERCIAL

## 2018-08-31 DIAGNOSIS — I25.9 CHEST PAIN DUE TO MYOCARDIAL ISCHEMIA, UNSPECIFIED ISCHEMIC CHEST PAIN TYPE: ICD-10-CM

## 2018-08-31 LAB
ANION GAP SERPL CALCULATED.3IONS-SCNC: 4 MMOL/L (ref 3–14)
BASOPHILS # BLD AUTO: 0.1 10E9/L (ref 0–0.2)
BASOPHILS NFR BLD AUTO: 0.9 %
BUN SERPL-MCNC: 17 MG/DL (ref 7–30)
CALCIUM SERPL-MCNC: 9 MG/DL (ref 8.5–10.1)
CHLORIDE SERPL-SCNC: 103 MMOL/L (ref 94–109)
CO2 SERPL-SCNC: 32 MMOL/L (ref 20–32)
CREAT SERPL-MCNC: 0.58 MG/DL (ref 0.52–1.04)
DIFFERENTIAL METHOD BLD: NORMAL
EOSINOPHIL # BLD AUTO: 0.4 10E9/L (ref 0–0.7)
EOSINOPHIL NFR BLD AUTO: 4.9 %
ERYTHROCYTE [DISTWIDTH] IN BLOOD BY AUTOMATED COUNT: 12.8 % (ref 10–15)
GFR SERPL CREATININE-BSD FRML MDRD: >90 ML/MIN/1.7M2
GLUCOSE SERPL-MCNC: 111 MG/DL (ref 70–99)
HCT VFR BLD AUTO: 43.5 % (ref 35–47)
HGB BLD-MCNC: 14.4 G/DL (ref 11.7–15.7)
IMM GRANULOCYTES # BLD: 0 10E9/L (ref 0–0.4)
IMM GRANULOCYTES NFR BLD: 0.1 %
INTERPRETATION ECG - MUSE: NORMAL
LYMPHOCYTES # BLD AUTO: 2.3 10E9/L (ref 0.8–5.3)
LYMPHOCYTES NFR BLD AUTO: 29.2 %
MCH RBC QN AUTO: 30.9 PG (ref 26.5–33)
MCHC RBC AUTO-ENTMCNC: 33.1 G/DL (ref 31.5–36.5)
MCV RBC AUTO: 93 FL (ref 78–100)
MONOCYTES # BLD AUTO: 0.5 10E9/L (ref 0–1.3)
MONOCYTES NFR BLD AUTO: 6.5 %
NEUTROPHILS # BLD AUTO: 4.6 10E9/L (ref 1.6–8.3)
NEUTROPHILS NFR BLD AUTO: 58.4 %
NRBC # BLD AUTO: 0 10*3/UL
NRBC BLD AUTO-RTO: 0 /100
NT-PROBNP SERPL-MCNC: 54 PG/ML (ref 0–900)
PLATELET # BLD AUTO: 286 10E9/L (ref 150–450)
POTASSIUM SERPL-SCNC: 4 MMOL/L (ref 3.4–5.3)
RBC # BLD AUTO: 4.66 10E12/L (ref 3.8–5.2)
SODIUM SERPL-SCNC: 139 MMOL/L (ref 133–144)
TROPONIN I BLD-MCNC: 0 UG/L (ref 0–0.1)
TROPONIN I SERPL-MCNC: <0.015 UG/L (ref 0–0.04)
WBC # BLD AUTO: 7.8 10E9/L (ref 4–11)

## 2018-08-31 PROCEDURE — G0378 HOSPITAL OBSERVATION PER HR: HCPCS

## 2018-08-31 PROCEDURE — 84484 ASSAY OF TROPONIN QUANT: CPT

## 2018-08-31 PROCEDURE — 25000132 ZZH RX MED GY IP 250 OP 250 PS 637: Performed by: EMERGENCY MEDICINE

## 2018-08-31 PROCEDURE — 99220 ZZC INITIAL OBSERVATION CARE,LEVL III: CPT | Performed by: PHYSICIAN ASSISTANT

## 2018-08-31 PROCEDURE — 71046 X-RAY EXAM CHEST 2 VIEWS: CPT

## 2018-08-31 PROCEDURE — 83880 ASSAY OF NATRIURETIC PEPTIDE: CPT | Performed by: EMERGENCY MEDICINE

## 2018-08-31 PROCEDURE — 85025 COMPLETE CBC W/AUTO DIFF WBC: CPT | Performed by: EMERGENCY MEDICINE

## 2018-08-31 PROCEDURE — 84484 ASSAY OF TROPONIN QUANT: CPT | Mod: 91 | Performed by: PHYSICIAN ASSISTANT

## 2018-08-31 PROCEDURE — 93005 ELECTROCARDIOGRAM TRACING: CPT

## 2018-08-31 PROCEDURE — 36415 COLL VENOUS BLD VENIPUNCTURE: CPT | Performed by: PHYSICIAN ASSISTANT

## 2018-08-31 PROCEDURE — 80048 BASIC METABOLIC PNL TOTAL CA: CPT | Performed by: EMERGENCY MEDICINE

## 2018-08-31 PROCEDURE — 73560 X-RAY EXAM OF KNEE 1 OR 2: CPT | Mod: RT

## 2018-08-31 PROCEDURE — 99285 EMERGENCY DEPT VISIT HI MDM: CPT | Mod: 25

## 2018-08-31 RX ORDER — LEVOTHYROXINE SODIUM 25 UG/1
25 TABLET ORAL
Status: DISCONTINUED | OUTPATIENT
Start: 2018-09-01 | End: 2018-09-01 | Stop reason: HOSPADM

## 2018-08-31 RX ORDER — MORPHINE SULFATE 4 MG/ML
2 INJECTION, SOLUTION INTRAMUSCULAR; INTRAVENOUS
Status: DISCONTINUED | OUTPATIENT
Start: 2018-08-31 | End: 2018-09-01 | Stop reason: HOSPADM

## 2018-08-31 RX ORDER — ACETAMINOPHEN 325 MG/1
650 TABLET ORAL EVERY 4 HOURS PRN
Status: DISCONTINUED | OUTPATIENT
Start: 2018-08-31 | End: 2018-09-01 | Stop reason: HOSPADM

## 2018-08-31 RX ORDER — ACETAMINOPHEN 650 MG/1
650 SUPPOSITORY RECTAL EVERY 4 HOURS PRN
Status: DISCONTINUED | OUTPATIENT
Start: 2018-08-31 | End: 2018-09-01 | Stop reason: HOSPADM

## 2018-08-31 RX ORDER — ASPIRIN 81 MG/1
162 TABLET, CHEWABLE ORAL ONCE
Status: DISCONTINUED | OUTPATIENT
Start: 2018-08-31 | End: 2018-08-31

## 2018-08-31 RX ORDER — ASPIRIN 81 MG/1
81 TABLET ORAL DAILY
Status: DISCONTINUED | OUTPATIENT
Start: 2018-08-31 | End: 2018-08-31

## 2018-08-31 RX ORDER — NITROGLYCERIN 80 MG/1
1 PATCH TRANSDERMAL ONCE
Status: COMPLETED | OUTPATIENT
Start: 2018-08-31 | End: 2018-08-31

## 2018-08-31 RX ORDER — ATORVASTATIN CALCIUM 40 MG/1
80 TABLET, FILM COATED ORAL DAILY
Status: DISCONTINUED | OUTPATIENT
Start: 2018-09-01 | End: 2018-09-01 | Stop reason: HOSPADM

## 2018-08-31 RX ORDER — NITROGLYCERIN 0.4 MG/1
0.4 TABLET SUBLINGUAL EVERY 5 MIN PRN
Status: DISCONTINUED | OUTPATIENT
Start: 2018-08-31 | End: 2018-09-01 | Stop reason: HOSPADM

## 2018-08-31 RX ORDER — ASPIRIN 81 MG/1
81 TABLET ORAL DAILY
Status: DISCONTINUED | OUTPATIENT
Start: 2018-09-01 | End: 2018-09-01 | Stop reason: HOSPADM

## 2018-08-31 RX ORDER — ALUMINA, MAGNESIA, AND SIMETHICONE 2400; 2400; 240 MG/30ML; MG/30ML; MG/30ML
30 SUSPENSION ORAL EVERY 4 HOURS PRN
Status: DISCONTINUED | OUTPATIENT
Start: 2018-08-31 | End: 2018-09-01 | Stop reason: HOSPADM

## 2018-08-31 RX ORDER — LIDOCAINE 40 MG/G
CREAM TOPICAL
Status: DISCONTINUED | OUTPATIENT
Start: 2018-08-31 | End: 2018-09-01 | Stop reason: HOSPADM

## 2018-08-31 RX ORDER — VARENICLINE TARTRATE 1 MG/1
1 TABLET, FILM COATED ORAL DAILY
Status: DISCONTINUED | OUTPATIENT
Start: 2018-08-31 | End: 2018-09-01 | Stop reason: HOSPADM

## 2018-08-31 RX ORDER — LISINOPRIL 2.5 MG/1
2.5 TABLET ORAL DAILY
Status: DISCONTINUED | OUTPATIENT
Start: 2018-09-01 | End: 2018-09-01 | Stop reason: HOSPADM

## 2018-08-31 RX ORDER — ASPIRIN 81 MG/1
162 TABLET, CHEWABLE ORAL ONCE
Status: COMPLETED | OUTPATIENT
Start: 2018-08-31 | End: 2018-08-31

## 2018-08-31 RX ORDER — VARENICLINE TARTRATE 1 MG/1
1 TABLET, FILM COATED ORAL DAILY
COMMUNITY
End: 2019-02-19

## 2018-08-31 RX ORDER — NALOXONE HYDROCHLORIDE 0.4 MG/ML
.1-.4 INJECTION, SOLUTION INTRAMUSCULAR; INTRAVENOUS; SUBCUTANEOUS
Status: DISCONTINUED | OUTPATIENT
Start: 2018-08-31 | End: 2018-09-01 | Stop reason: HOSPADM

## 2018-08-31 RX ADMIN — ASPIRIN 81 MG 162 MG: 81 TABLET ORAL at 15:38

## 2018-08-31 RX ADMIN — NITROGLYCERIN 1 PATCH: 0.4 PATCH TRANSDERMAL at 15:39

## 2018-08-31 ASSESSMENT — ENCOUNTER SYMPTOMS
COUGH: 0
NAUSEA: 0
FEVER: 0
LIGHT-HEADEDNESS: 1
VOMITING: 0
SHORTNESS OF BREATH: 0
CHILLS: 0

## 2018-08-31 NOTE — PLAN OF CARE
Problem: Patient Care Overview  Goal: Plan of Care/Patient Progress Review  ROOM # 229     Living Situation (if not independent, order SW consult): lives with    Facility name:  : [facility] () [phone]    Activity level at baseline: independent   Activity level on admit: independent       Patient registered to observation; given Patient Bill of Rights; given the opportunity to ask questions about observation status and their plan of care.  Patient has been oriented to the observation room, bathroom and call light is in place.    Discussed discharge goals and expectations with patient/family.

## 2018-08-31 NOTE — ED NOTES
Patient sitting on edge of bed awaiting admission. No complaints.  inquiring about admission status.

## 2018-08-31 NOTE — ED NOTES
"[facility]  ED Nurse Handoff Report    Lizzie Chow is a 57 year old female   ED Chief complaint: Chest Pain and Dizziness  . ED Diagnosis:   Final diagnoses:   Chest pain due to myocardial ischemia, unspecified ischemic chest pain type (H)     Allergies:   Allergies   Allergen Reactions     No Known Drug Allergies        Code Status: Prior  Activity level - Baseline/Home:  Independent. Activity Level - Current:   Stand with Assist. Lift room needed: No. Bariatric: No   Needed: No   Isolation: No. Infection: Not Applicable.     Vital Signs:   Vitals:    08/31/18 1630 08/31/18 1645 08/31/18 1700 08/31/18 1715   BP: 147/83 158/84 148/86 (!) 153/95   Pulse:       Resp:       Temp:       TempSrc:       SpO2: 95% 94% 92% 98%   Weight:       Height:           Cardiac Rhythm:  ,   Cardiac  Cardiac Rhythm: Normal sinus rhythm  Pain level: 0-10 Pain Scale: 4 (states pain has decreased since arrival to ED )  Patient confused: No. Patient Falls Risk: No.   Elimination Status: Unable to void   Patient Report - Initial Complaint: Patient complaining of chest pain/tightness and dizziness since last night.       ABCs intact.  Alert and oriented x 3.  . Focused Assessment: Cardiac - Cardiac Comment: chest \"tightness\"  Review of Systems (Cardiac) - Cardiac Signs/Symptoms: chest pain; dizziness  Chest Pain Assessment - Rating (0-10): 5 Duration: 14 hours Associated Signs/Symptoms: dizziness Chest Pain Reproducible?: No  Cardiac Monitoring - EKG Monitoring: Yes Cardiac Regularity: Regular Cardiac Rhythm: NSR   Tests Performed: EKG, Labs, Xray. Abnormal Results:   Labs Ordered and Resulted from Time of ED Arrival Up to the Time of Departure from the ED   BASIC METABOLIC PANEL - Abnormal; Notable for the following:        Result Value    Glucose 111 (*)     All other components within normal limits   CBC WITH PLATELETS DIFFERENTIAL   NT PROBNP INPATIENT   PULSE OXIMETRY NURSING   CARDIAC CONTINUOUS MONITORING "   PERIPHERAL IV CATHETER   BLOOD PRESSURE   TROPONIN POCT     XR Chest 2 Views   Final Result   IMPRESSION: No acute consolidation.      ROSE MARY LINDA MD      XR Knee Right 1/2 Views   Final Result   IMPRESSION: No fracture identified.      ROSE MARY LINDA MD      .   Treatments provided: nitroglycerin, Aspirin  Family Comments:  at bedside   OBS brochure/video discussed/provided to patient:  Yes  ED Medications:   Medications   nitroGLYcerin (NITRODUR) patch REMOVAL (not administered)   aspirin chewable tablet 162 mg (162 mg Oral Given 8/31/18 7787)   nitroGLYcerin (NITRODUR) 0.4 MG/HR 24 hr patch 1 patch (1 patch Transdermal Given 8/31/18 8991)     Drips infusing:  No  For the majority of the shift, the patient's behavior Green. Interventions performed were NA.     Severe Sepsis OR Septic Shock Diagnosis Present: No      ED Nurse Name/Phone Number: Dipti ALLENJes Mukherjee,   5:29 PM    RECEIVING UNIT ED HANDOFF REVIEW    Above ED Nurse Handoff Report was reviewed: Yes  Reviewed by: Marsha Arnago on August 31, 2018 at 6:18 PM

## 2018-08-31 NOTE — ED TRIAGE NOTES
Patient complaining of chest pain/tightness and dizziness since last night.      ABCs intact.  Alert and oriented x 3.

## 2018-08-31 NOTE — IP AVS SNAPSHOT
[facility] Observation Department    [address]    [facility] [address]    Phone:  [phone]                                       After Visit Summary   8/31/2018    Lizzie Chow    MRN: [id number]           After Visit Summary Signature Page     I have received my discharge instructions, and my questions have been answered. I have discussed any challenges I see with this plan with the nurse or doctor.    ..........................................................................................................................................  Patient/Patient Representative Signature      ..........................................................................................................................................  Patient Representative Print Name and Relationship to Patient    ..................................................               ................................................  Date                                            Time    ..........................................................................................................................................  Reviewed by Signature/Title    ...................................................              ..............................................  Date                                                            Time          22EPIC Rev 08/18

## 2018-08-31 NOTE — ED PROVIDER NOTES
History     Chief Complaint:  Chest Pain     HPI   Lizzie Chow is a 57 year old female with a history of CAD s/p stent placement in 2014, hypertension, hyperlipidemia, type 2 diabetes, and ongoing tobacco use who presents for evaluation of chest pain. The patient reports she has had intermittent chest pain since yesterday afternoon that feels similar to her prior chest pain with her NSTEMI in 2014, though not quite as severe. She states symptoms started yesterday afternoon with pressure sensation in her chest while she was sitting at her desk at work. Pain was intermittent throughout the day yesterday, lasting a few minutes at a time, and persisted when she woke up this morning. Pain does not radiate. She states pain seems to be worse with exertion. She took 1 Nitroglycerin this morning with improvement in her pain. She reports some associated light-headedness. She was concerned due to ongoing pain today, prompting her visit to the ED.  notes that in 2014, she had 3 days of pain prior to seeking evaluation, and pain was more severe at that time, though a similar presentation to this. The patient initially reports that her pain is about a 5/10 in the ED, but was completely resolved by the end of my interview. She notes she fell a couple weeks ago and has some pain in her right knee, but she has been walking without difficulty.  She denies any nausea or vomiting. She denies any shortness of breath. She continues to smoke and is on Chantix to try and quit. She takes a baby aspirin daily.     The patients last echo was in December 2014.     Left heart cath 6/16/2014:  INDICATION FOR THE PROCEDURE:  Acute coronary syndrome with anterior   wall motion abnormality.   PROCEDURES:   1.  Left heart catheterization and left ventriculogram.   2.  Selective coronary artery angiography.   3.  Balloon angioplasty and stenting of a proximal LAD stenosis using   a 3.0 balloon, a 3.5 x 18 mm Resolute drug-eluting stent with    post-stent dilatation up to 16 atmospheres using a 3.75 noncompliant   balloon from an initial 99% GUILLERMINA grade 3 flow stenosis to a residual   of 0%.     .   FINDINGS:   1.  The left ventricular end-diastolic pressure was around 30 mmHg.    There was no gradient across the aortic valve on pullback.   2.  Left ventriculogram revealed an ejection fraction of around 40%   with hypokinesia of the mid to distal anterior wall, apical wall and   inferior apical wall.  No significant mitral insufficiency was seen.         3.  The right coronary artery was dominant and angiographically   smooth.   4.  The left main and circumflex artery were angiographically smooth.    The proximal LAD just before the major septal  had a 99%   stenosis that was treated in the fashion mentioned above.  There was,   however, GUILLERMINA grade 3 flow distally.  The residual stenosis was 0%   with good antegrade flow and no hangup of contrast.       CONCLUSIONS:  Successful intervention to a severe proximal LAD   stenosis with no angiographic stenosis elsewhere.       DISCUSSION:  The patient has a drug-eluting stent and should remain   on Plavix uninterrupted for at least one year with usual risk   modification.     CARDIAC RISK FACTORS:  Sex:    Female  Tobacco:   Yes  Hypertension:   Yes  Hyperlipidemia:  Yes  Diabetes:   Yes  Family History:  No (mother had stents placed at age 65)  Personal History: Yes    Allergies:  No known drug allergies     Medications:    albuterol (PROAIR HFA/PROVENTIL HFA/VENTOLIN HFA) 108 (90 BASE) MCG/ACT Inhaler  aspirin EC 81 MG EC tablet  atorvastatin (LIPITOR) 80 MG tablet  carvedilol (COREG) 6.25 MG tablet  levothyroxine (SYNTHROID/LEVOTHROID) 25 MCG tablet  lisinopril (PRINIVIL/ZESTRIL) 2.5 MG tablet  nitroglycerin (NITROSTAT) 0.4 MG SL tablet  Chantix    Past Medical History:    CAD with stent placement in 2014  Hypertension  Hyperlipidemia  Ischemic cardiomyopathy  Type 2 diabetes  Fatty liver  "disease  Edema  Chronic pain  Ischemic cardiomyopathy  Lumbago  Plantar fascitis  Stress incontinence  Tobacco abuse  NSTEMI    Past Surgical History:    LAD stent 2014  Ventral herniorrhaphy    Family History:    Hyperlipidemia  CAD, mother at age 65, s/p stent placement  Hypertension  Diabetes  Leukemia    Social History:  Smoking status: Patient continues to smoke.   Alcohol use: Occasional   Presents to the ED with her .   Marital Status:   [2]     Review of Systems   Constitutional: Negative for chills and fever.   Respiratory: Negative for cough and shortness of breath.    Cardiovascular: Positive for chest pain.   Gastrointestinal: Negative for nausea and vomiting.   Neurological: Positive for light-headedness.   All other systems reviewed and are negative.      Physical Exam     Patient Vitals for the past 24 hrs:   BP Temp Temp src Pulse Heart Rate Resp SpO2 Height Weight   08/31/18 1825 115/79 - - - 73 - 98 % - -   08/31/18 1720 157/89 - - - 70 - 96 % - -   08/31/18 1715 (!) 153/95 - - - 72 - 98 % - -   08/31/18 1710 (!) 150/91 - - - 74 - 95 % - -   08/31/18 1705 149/88 - - - 69 - 93 % - -   08/31/18 1621 140/86 - - - 64 - 94 % - -   08/31/18 1555 (!) 159/127 - - - - - 96 % - -   08/31/18 1554 - - - - 67 - 96 % - -   08/31/18 1545 167/88 - - - 69 - 94 % - -   08/31/18 1530 (!) 163/98 - - - 67 - 95 % - -   08/31/18 1514 149/89 - - 66 - - 95 % - -   08/31/18 1453 - - - - - - 96 % - -   08/31/18 1440 147/84 98.5  F (36.9  C) Temporal 78 - 16 95 % 1.676 m (5' 6\") 100.2 kg (221 lb)       Physical Exam  General: Alert, well appearing.   HEENT:   The scalp and head appear normal    The pupils are equal, round, and reactive to light    Extraocular muscles are intact.    The nose is normal.    The oropharynx is normal.      Uvula is in the midline.    Neck:  Normal range of motion.    Lungs:  Clear.      No rales, no wheezing.      There is no tachypnea.  Non-labored.  Cardiac: Regular rate.  "     Normal S1 and S2.      No pathological murmur/rub      Chest wall non-tender.   Abdomen: Soft. No distension, no localized tenderness or rebound.  MS:  Normal tone. Normal movement of all extremities.     Peripheral soft tissue swelling adjacent to the right patella. No effusion.   Neurologic:     Normal mentation.  No cranial nerve deficits.  No focal motor or sensory                            changes.      Speech normal.  Psych:  Awake.     Alert.      Normal affect.      Appropriate interactions.  Skin:  No rash.      No lesions.    Emergency Department Course   ECG (14:45:01):  Rate 74 bpm. NV interval 180. QRS duration 94. QT/QTc 410/455. P-R-T axes 60 21 31. Normal sinus rhythm. Normal ECG   Interpreted at 1450 by Armando Jett MD.    Imaging:  Radiographic findings were communicated with the patient who voiced understanding of the findings.    XR Chest 2 views  No acute consolidation.  As read by Radiology.    XR Right Knee   No fracture identified.  As read by Radiology.    Laboratory:  Troponin POCT (1516) 0.00  CBC: WNL (WBC 7.8, HGB 14.4, )  BMP: Glucose 111(H), o/w WNL (Creatinine 0.58)  BNP: 54    Interventions:  1538: Aspirin 162 mg oral  1539: nitro paste    Emergency Department Course:  Past medical records, nursing notes, and vitals reviewed.  1521: I performed an exam of the patient and obtained history, as documented above.  IV inserted and blood drawn. The patient was placed on continuous cardiac monitoring and pulse oximetry.  ECG obtained, results above.   The patient was sent for a chest x-ray while in the emergency department, findings above.    1723: Discussed the case with MARIALUISA Andrade on call for the hospitalist service who accepts the patient for admission.    1743: I spoke to Dr. Garo Dsouza on call for cardiology. Agrees with plan for no heparin.     Findings and plan explained to the Patient who consents to admission.   Discussed the patient with Ema Miller, who  will admit the patient to an obs bed for further monitoring, evaluation, and treatment.     HEART Score  Background  Calculates the overall risk of adverse event in patient's presenting with chest pain.  Based on 5 criteria (each assigned 0-2 points) including suspiciousness of history, EKG, age, risk factors and troponin.    Data  57 year old female  has Former smoker; Flat feet; Family history of coronary artery disease; Fatty liver; Plantar fasciitis; Hypertension goal BP (blood pressure) < 140/90; NSTEMI (non-ST elevated myocardial infarction) (H); CAD (coronary artery disease); Ischemic cardiomyopathy; Hyperlipidemia with target LDL less than 100; Menopausal state; Type 2 diabetes, HbA1c goal < 7% (H); Advanced directives, counseling/discussion; Benign essential hypertension; Acquired hypothyroidism; and Type 2 diabetes mellitus without complication, without long-term current use of insulin (H) on her problem list.   reports that she quit smoking about 13 years ago. She has never used smokeless tobacco.  family history includes C.A.D. (age of onset: 56) in her maternal grandmother; C.A.D. (age of onset: 65) in her mother; Cardiovascular in her mother; Diabetes in her mother and sister; Family History Negative in her brother, brother, father, sister, and sister; Hypertension in her maternal grandfather, mother, and paternal grandfather; Lipids in her mother; Obesity in her brother, maternal grandfather, and paternal grandfather. There is no history of Breast Cancer or Cancer - colorectal.  Lab Results   Component Value Date    TROP  11/03/2015     <0.015  The 99th percentile for upper reference range is 0.045 ug/L.  Troponin values in   the range of 0.045 - 0.120 ug/L may be associated with risks of adverse   clinical events.       Criteria   0-2 points for each of 5 items (maximum of 10 points):  Score 1- History moderately suspicious for coronary syndrome  Score 0- EKG Normal  Score 1- Age 45 to 65 years  old  Score 2- Three or more risk factors for or history of atherosclerotic disease  Score 0- Within normal limits for troponin levels  Interpretation  Risk of adverse outcome  Heart Score: 4  Total Score 4-6- Adverse Outcome Risk 20.3% - Supports admission with standard rule-out management -serial troponins and stress testing    Impression & Plan      Medical Decision Making:  Lizzie Chow is a 57 year old female with a history of coronary artery disease and stent placement who presents with similar symptoms of when she had angina previously. The patient says the pain would come and go. It first started at work with exertion. She felt slightly light-headed with it. It was 5/10 when we went to see her, by the end of our discussion it had resolved with rest.     The patient continues to smoke. She is being treated for hypertension, hyperlipidemia, and type 2 diabetes, and previous history.     Currently the patient is pain free with a non-diagnostic ECG, normal first troponin, but a HEART score of 4.     Since this pain is reminiscent of what she has had previously but not as severe, I am going to admit her for unstable angina. She did receive 2 baby Aspirin here. Her blood pressure is well controlled. I am not going to heparinize at this point and have not started any nitroglycerin due to her blood pressure being well controlled. I discussed with Dr. Dsouza of cardiology who has reviewed her ECGs and prior angiogram and agrees on holding on Heparin for now.     She will be admitted to cardiac observation. She said that she is able to ambulate and do a treadmill stress test despite some mild bursitis of her right knee. I did do an x-ray because she had fallen a few weeks ago to make sure there was not a fractured patella which there was not. There no was no effusion. She can flex and extend and again she says she can walk without pain on it.     Diagnosis:    ICD-10-CM   1. Chest pain due to myocardial ischemia,  unspecified ischemic chest pain type (H) I20.9     Disposition: Admit obs    Dia Arechigamore  8/31/2018   [facility] EMERGENCY DEPARTMENT    I, Dia Benja, am serving as a scribe at 3:21 PM on 8/31/2018 to document services personally performed by Armando Jett MD based on my observations and the provider's statements to me.        Armando Jett MD  08/31/18 2859

## 2018-08-31 NOTE — IP AVS SNAPSHOT
MRN:[id number]                      After Visit Summary   8/31/2018    Lizzie Chow    MRN: [id number]           Thank you!     Thank you for choosing [facility] for your care. Our goal is always to provide you with excellent care. Hearing back from our patients is one way we can continue to improve our services. Please take a few minutes to complete the written survey that you may receive in the mail after you visit. If you would like to speak to someone directly about your visit please contact Patient Relations at [phone]. Thank you!          Patient Information     Date Of Birth          1960        About your hospital stay     You were admitted on:  August 31, 2018 You last received care in the:  [facility] Observation Department    You were discharged on:  September 1, 2018        Reason for your hospital stay       You were admitted to the observation unit for chest pain. Your heart was monitored overnight with no abnormal findings. Your cardiac enzymes were negative for heart attack. You had an exercise stress test that was negative for heart disease so we do not believe your chest pain was related to your heart. Other possibilities include reflux, stress, and musculoskeletal strain. We recommend you follow up with your primary doctor if you continue to have pain and keep scheduled follow up with your cardiologist this fall.                  Who to Call     For medical emergencies, please call 911.  For non-urgent questions about your medical care, please call your primary care provider or clinic, [phone]          Attending Provider     Provider Specialty    Armando Jett MD Emergency Medicine    Gabe Van MD Internal Medicine       Primary Care Provider Office Phone # Fax #    Gladys Julián Solis [phone]      After Care Instructions     Activity       Your activity upon discharge: activity as tolerated             "Diet       Follow this diet upon discharge: Orders Placed This Encounter      Low Saturated Fat Na <2400 mg            Discharge Instructions       Recommend you keep track of any recurrent symptoms and in what context to discuss in follow up with your primary care provider.                  Follow-up Appointments     Follow-up and recommended labs and tests        Follow up with primary care provider, Gladys Solis, within 7 days for hospital follow- up.  No follow up labs or test are needed.                  Your next 10 appointments already scheduled     Oct 24, 2018  7:20 AM CDT   Office Visit with Gladys Solis MD   [facility] ([facility])    [address] [address] [phone]           Bring a current list of meds and any records pertaining to this visit. For Physicals, please bring immunization records and any forms needing to be filled out. Please arrive 10 minutes early to complete paperwork.                         Pending Results     No orders found for last 3 day(s).            Statement of Approval     Ordered          09/01/18 1113  I have reviewed and agree with all the recommendations and orders detailed in this document.  EFFECTIVE NOW     Approved and electronically signed by:  Latoya Hanks PA-C             Admission Information     Date & Time Provider Department Dept. Phone    8/31/2018 Gabe Van MD [facility] Observation Department [phone]      Your Vitals Were     Blood Pressure Pulse Temperature Respirations Height Weight    149/75 (BP Location: Right arm) 78 97.1  F (36.2  C) (Oral) 16 1.676 m (5' 6\") 100.2 kg (221 lb)    Last Period Pulse Oximetry BMI (Body Mass Index)             07/01/2013 95% 35.67 kg/m2         MyChart Information     [employer] gives you secure access to your electronic health record. If you see a primary care provider, you can also send messages to your care team and " make appointments. If you have questions, please call your primary care clinic.  If you do not have a primary care provider, please call [phone] and they will assist you.        Care EveryWhere ID     This is your Care EveryWhere ID. This could be used by other organizations to access your [facility] medical records  [id number]        Equal Access to Services     CLIVE ADAMS : Hadbrooke Minor, wajacky dunaway, qaitzta kaalsang cervantes, mago rios . So [facility] [phone].    ATENCIÓN: Si habla español, tiene a mcdonald disposición servicios gratuitos de asistencia lingüística. Llame al [phone].    We comply with applicable federal civil rights laws and [place] laws. We do not discriminate on the basis of race, color, national origin, age, disability, sex, sexual orientation, or gender identity.               Review of your medicines      CONTINUE these medicines which have NOT CHANGED        Dose / Directions    albuterol 108 (90 Base) MCG/ACT inhaler   Commonly known as:  PROAIR HFA/PROVENTIL HFA/VENTOLIN HFA   Used for:  Cough        Dose:  2 puff   Inhale 2 puffs into the lungs every 4 hours as needed   Quantity:  1 Inhaler   Refills:  1       aspirin 81 MG EC tablet   Used for:  CAD (coronary artery disease)        Dose:  81 mg   Take 1 tablet (81 mg) by mouth daily   Quantity:  30 tablet   Refills:  11       atorvastatin 80 MG tablet   Commonly known as:  LIPITOR   Used for:  Mixed hyperlipidemia        Dose:  80 mg   Take 1 tablet (80 mg) by mouth daily   Quantity:  90 tablet   Refills:  3       carvedilol 6.25 MG tablet   Commonly known as:  COREG   Used for:  Coronary artery disease involving native coronary artery with unstable angina pectoris (H)        Dose:  6.25 mg   Take 1 tablet (6.25 mg) by mouth 2 times daily   Quantity:  180 tablet   Refills:  3       levothyroxine 25 MCG tablet   Commonly known as:  SYNTHROID/LEVOTHROID   Used for:  Acquired  hypothyroidism        Dose:  25 mcg   Take 1 tablet (25 mcg) by mouth daily   Quantity:  90 tablet   Refills:  3       lisinopril 2.5 MG tablet   Commonly known as:  PRINIVIL/Zestril   Used for:  Benign essential hypertension        Dose:  2.5 mg   Take 1 tablet (2.5 mg) by mouth daily   Quantity:  90 tablet   Refills:  3       nitroGLYcerin 0.4 MG sublingual tablet   Commonly known as:  NITROSTAT   Used for:  Coronary artery disease of native heart with stable angina pectoris, unspecified vessel or lesion type (H)        Dose:  0.4 mg   Place 1 tablet (0.4 mg) under the tongue every 5 minutes as needed for chest pain   Quantity:  25 tablet   Refills:  1       varenicline 1 MG tablet   Commonly known as:  CHANTIX        Dose:  1 mg   Take 1 mg by mouth daily   Refills:  0                Protect others around you: Learn how to safely use, store and throw away your medicines at www.disposemymeds.org.             Medication List: This is a list of all your medications and when to take them. Check marks below indicate your daily home schedule. Keep this list as a reference.      Medications           Morning Afternoon Evening Bedtime As Needed    albuterol 108 (90 Base) MCG/ACT inhaler   Commonly known as:  PROAIR HFA/PROVENTIL HFA/VENTOLIN HFA   Inhale 2 puffs into the lungs every 4 hours as needed                                   aspirin 81 MG EC tablet   Take 1 tablet (81 mg) by mouth daily   Last time this was given:  81 mg on 9/1/2018  8:23 AM                                atorvastatin 80 MG tablet   Commonly known as:  LIPITOR   Take 1 tablet (80 mg) by mouth daily   Last time this was given:  80 mg on 9/1/2018  8:23 AM                                   carvedilol 6.25 MG tablet   Commonly known as:  COREG   Take 1 tablet (6.25 mg) by mouth 2 times daily   Last time this was given:  6.25 mg on 9/1/2018 11:24 AM                                      levothyroxine 25 MCG tablet   Commonly known as:   SYNTHROID/LEVOTHROID   Take 1 tablet (25 mcg) by mouth daily   Last time this was given:  25 mcg on 9/1/2018  8:23 AM                                   lisinopril 2.5 MG tablet   Commonly known as:  PRINIVIL/Zestril   Take 1 tablet (2.5 mg) by mouth daily   Last time this was given:  2.5 mg on 9/1/2018  8:23 AM                                   nitroGLYcerin 0.4 MG sublingual tablet   Commonly known as:  NITROSTAT   Place 1 tablet (0.4 mg) under the tongue every 5 minutes as needed for chest pain                                   varenicline 1 MG tablet   Commonly known as:  CHANTIX   Take 1 mg by mouth daily

## 2018-08-31 NOTE — ED NOTES
"Patient states she was using a voper cigarette last night \" a lot\", also taking Chantex. Patient concerned this chest pressure is a possible a side effect.  "

## 2018-08-31 NOTE — PHARMACY-ADMISSION MEDICATION HISTORY
Admission medication history interview status for this patient is complete. See [facility] admission navigator for allergy information, prior to admission medications and immunization status.     Medication history interview source(s):Patient  Medication history resources (including written lists, pill bottles, clinic record):None    Changes made to PTA medication list:  Added: chantix  Deleted: none  Changed: none    Actions taken by pharmacist (provider contacted, etc):None     Additional medication history information:None    Medication reconciliation/reorder completed by provider prior to medication history? No    For patients on insulin therapy: no (Yes/No)   Lantus/levemir/NPH/Mix 70/30 dose: ___ in AM/PM or twice daily   Sliding scale Novolog Y/N   If Yes, do you have a baseline novolog pre-meal dose: ______units with meals   Patients eat three meals a day: Y/N ---  How many episodes of hypoglycemia (low blood glucose) do you have weekly: ---   How many missed doses do you have a week: ---  How many times do you check your blood glucose per day: ---  Any Barriers to therapy: cost of medications/comfortable with giving injections (if applicable)/ comfortable and confident with current diabetes regimen ---      Prior to Admission medications    Medication Sig Last Dose Taking? Auth Provider   albuterol (PROAIR HFA/PROVENTIL HFA/VENTOLIN HFA) 108 (90 BASE) MCG/ACT Inhaler Inhale 2 puffs into the lungs every 4 hours as needed Past Month at Unknown time Yes Gladys Solis MD   aspirin EC 81 MG EC tablet Take 1 tablet (81 mg) by mouth daily 8/31/2018 at am Yes Hiram Gooden MD   atorvastatin (LIPITOR) 80 MG tablet Take 1 tablet (80 mg) by mouth daily 8/31/2018 at Unknown time Yes Dereje Castillo MD   carvedilol (COREG) 6.25 MG tablet Take 1 tablet (6.25 mg) by mouth 2 times daily 8/31/2018 at am Yes Dereje Castillo MD   levothyroxine (SYNTHROID/LEVOTHROID) 25 MCG tablet Take 1 tablet (25 mcg) by mouth daily  8/31/2018 at am Yes Gladys Solis MD   lisinopril (PRINIVIL/ZESTRIL) 2.5 MG tablet Take 1 tablet (2.5 mg) by mouth daily 8/31/2018 at am Yes Gladys Solis MD   nitroglycerin (NITROSTAT) 0.4 MG SL tablet Place 1 tablet (0.4 mg) under the tongue every 5 minutes as needed for chest pain x2 Yes Gladys Solis MD   varenicline (CHANTIX) 1 MG tablet Take 1 mg by mouth daily 8/30/2018 at Unknown time Yes Unknown, Entered By History

## 2018-09-01 ENCOUNTER — APPOINTMENT (OUTPATIENT)
Dept: CARDIOLOGY | Facility: CLINIC | Age: 58
End: 2018-09-01
Attending: PHYSICIAN ASSISTANT
Payer: COMMERCIAL

## 2018-09-01 VITALS
HEART RATE: 78 BPM | SYSTOLIC BLOOD PRESSURE: 149 MMHG | DIASTOLIC BLOOD PRESSURE: 75 MMHG | RESPIRATION RATE: 16 BRPM | WEIGHT: 221 LBS | BODY MASS INDEX: 35.52 KG/M2 | OXYGEN SATURATION: 95 % | TEMPERATURE: 97.1 F | HEIGHT: 66 IN

## 2018-09-01 LAB — TROPONIN I SERPL-MCNC: <0.015 UG/L (ref 0–0.04)

## 2018-09-01 PROCEDURE — 93016 CV STRESS TEST SUPVJ ONLY: CPT | Performed by: INTERNAL MEDICINE

## 2018-09-01 PROCEDURE — 93350 STRESS TTE ONLY: CPT | Mod: 26 | Performed by: INTERNAL MEDICINE

## 2018-09-01 PROCEDURE — 99217 ZZC OBSERVATION CARE DISCHARGE: CPT | Performed by: PHYSICIAN ASSISTANT

## 2018-09-01 PROCEDURE — 93321 DOPPLER ECHO F-UP/LMTD STD: CPT | Mod: 26 | Performed by: INTERNAL MEDICINE

## 2018-09-01 PROCEDURE — 25000132 ZZH RX MED GY IP 250 OP 250 PS 637: Performed by: PHYSICIAN ASSISTANT

## 2018-09-01 PROCEDURE — 25500064 ZZH RX 255 OP 636: Performed by: HOSPITALIST

## 2018-09-01 PROCEDURE — 93018 CV STRESS TEST I&R ONLY: CPT | Performed by: INTERNAL MEDICINE

## 2018-09-01 PROCEDURE — G0378 HOSPITAL OBSERVATION PER HR: HCPCS

## 2018-09-01 PROCEDURE — 93325 DOPPLER ECHO COLOR FLOW MAPG: CPT | Mod: TC

## 2018-09-01 PROCEDURE — 93325 DOPPLER ECHO COLOR FLOW MAPG: CPT | Mod: 26 | Performed by: INTERNAL MEDICINE

## 2018-09-01 RX ORDER — CARVEDILOL 6.25 MG/1
6.25 TABLET ORAL 2 TIMES DAILY
Status: DISCONTINUED | OUTPATIENT
Start: 2018-09-01 | End: 2018-09-01 | Stop reason: HOSPADM

## 2018-09-01 RX ADMIN — LEVOTHYROXINE SODIUM 25 MCG: 25 TABLET ORAL at 08:23

## 2018-09-01 RX ADMIN — ATORVASTATIN CALCIUM 80 MG: 40 TABLET, FILM COATED ORAL at 08:23

## 2018-09-01 RX ADMIN — HUMAN ALBUMIN MICROSPHERES AND PERFLUTREN 4 ML: 10; .22 INJECTION, SOLUTION INTRAVENOUS at 07:29

## 2018-09-01 RX ADMIN — CARVEDILOL 6.25 MG: 6.25 TABLET, FILM COATED ORAL at 11:24

## 2018-09-01 RX ADMIN — LISINOPRIL 2.5 MG: 2.5 TABLET ORAL at 08:23

## 2018-09-01 RX ADMIN — ASPIRIN 81 MG: 81 TABLET, COATED ORAL at 08:23

## 2018-09-01 NOTE — PLAN OF CARE
"Problem: Patient Care Overview  Goal: Plan of Care/Patient Progress Review  PRIMARY DIAGNOSIS: CHEST PAIN  OUTPATIENT/OBSERVATION GOALS TO BE MET BEFORE DISCHARGE:  1. Ruled out acute coronary syndrome (negative or stable Troponin):  Yes  2. Pain Status: Pain free.  3. Appropriate provocative testing performed: Yes  - Stress Test Procedure: PA seeing now   - Interpretation of cardiac rhythm per telemetry tech: SR with HR 60's    4. Cleared by Consultants (if applicable):No  5. Return to near baseline physical activity: Yes  Discharge Planner Nurse   Safe discharge environment identified: Yes  Barriers to discharge: Yes       Entered by: Marsha Arango 08/31/2018 7:09 PM     Please review provider order for any additional goals.   Nurse to notify provider when observation goals have been met and patient is ready for discharge.  Patient is alert and oriented x4. VS WNL and documented on the FS. Lung sounds clear in all lobes and patient is on RA. Denies SOB. Active bowel sounds with LBM today. Denies any pian, urgency, and frequency when voiding. Patient stated she had tripped and has swelling and bruising in the (R) knee/leg. Pulses +2 in bilateral LE. Capillary refill <3 seconds. Patient denies chest pain at this time. Tele in place. Patient does have some erythema on lower abdomen that she was unaware of. Patient independent in room. PA seeing patient now and awaiting additional orders. Call light within reach.  /81  Pulse 66  Temp 97.8  F (36.6  C) (Oral)  Resp 18  Ht 1.676 m (5' 6\")  Wt 100.2 kg (221 lb)  LMP 07/01/2013  SpO2 95%  BMI 35.67 kg/m2        "

## 2018-09-01 NOTE — H&P
History and Physical     Lizzie Chow MRN# [id number]   YOB: 1960 Age: 57 year old      Date of Admission:  8/31/2018    Primary care provider: Gladys Solis          Assessment and Plan:   Lizzie Chow is a 57 year old female with a PMH significant for NSTEMI with stent placement in 6/2014 to proximal LAD, hypertension, high cholesterol and history of tobacco abuse who presents with chest discomfort concerning for ACS.    Patient was discussed with Dr. Bradley, who was provider in ED. Chart review of ED work up was reviewed as well as chart review of Care Everywhere, previous visits and admissions.  Per ED she was put on a nitro patch for her blood pressure as she was pain-free on arrival.  Her ECG was nonischemic and her initial troponin was negative.  We requested that cardiology be called to see if she should stay here or be placed on heparin for possible cath but they requested serial troponins and if a stress test.    1. Chest pain concerning for ACS  Patient presents with nonexertional lightheadedness and dizziness with a centralized chest heaviness that has been intermittent since yesterday.  The symptoms improve after nitroglycerin.  She does not have associated diaphoresis, nausea or shortness of breath.  She does state this feels different than her an STEMI in 2014.  She has a history of a drug-eluting stent placed in her proximal LAD and has been medically managed by Dr. CHOUDHARY since that time.  In the ED she has a nonischemic EKG, negative troponin and a normal chest x-ray except for cardiac enlargement.  We were concerned that this could possibly be an unstable angina and requested cardiology to be called who recommended serial troponins with stress echo tomorrow.  They did not recommend heparin at this time.  Per the ED a nitroglycerin patch was placed not because of chest pain but because of hypertension.  I plan to remove this now.  -As stated above I am concerned that this could  potentially be ACS  -Plan for troponin trend  -Monitor on telemetry  -Continue aspirin  -Have nitroglycerin and morphine available for pain.  If needed will reapply nitroglycerin patch.  If this is necessary we will likely start on heparin drip.  -Order stress echo for a.m. the patient is not sure she will complete as she has right lower leg pain from a recent fall.  -Hold carvedilol given possibility of stress echo continue statin    2.  Nicotine abuse  Continue Chantix      Social: No concerns  Code: Discussed with patient and they have chosen full code  VTE prophylaxis: Ambulation  Disposition: Observation                    Chief Complaint:   Chest pressure         History of Present Illness:   Lizzie Chow is a 57 year old female who presents with a 2 day history of intermittent chest discomfort.  She was at work yesterday when she developed lightheadedness with dizziness and a central chest heaviness.  She is unsure how long this lasted but it was intermittent throughout the day until she returned home.  She went to bed chest pain-free but was awoken from sleep at 2 AM with chest pressure and took a nitroglycerin which helped.  This a.m. she just did not feel like herself and continue to have chest pressures and stabbing pains throughout the day.  She took a couple more nitro with improvement.  She recently restarted Chantix approximately 1 week ago and has noted some dizziness with Chantix.  She has sweating at baseline but does not notice any increased diaphoresis with the chest discomfort and does not have any shortness of breath.  She recently hurt her right lower leg and has some swelling from that but otherwise does not note any increased swelling.  She takes all of her medications as prescribed and is currently trying to quit smoking again.  She has not been ill recently and denies diarrhea, abdominal pain, vomiting.              Past Medical History:     Past Medical History:   Diagnosis Date     CAD  (coronary artery disease) 6/15/14    MI, stent LAD      Chronic pain     both feet intermittently     Edema     furosemide     Elevated glucose      Fatty liver disease, nonalcoholic      HTN (hypertension)      Hyperlipidemia      Ischemic cardiomyopathy     EF 40-45%     Lumbago      Obesity (BMI 30-39.9)      Plantar fasciitis      Stress incontinence      Tobacco abuse      Umbilical hernia     has had surgical consult               Past Surgical History:     Past Surgical History:   Procedure Laterality Date     CARDIAC SURGERY      stent     COLONOSCOPY N/A 12/8/2016    Procedure: COLONOSCOPY;  Surgeon: Ranjith Serrano MD;  Location: [facility]     HERNIORRHAPHY VENTRAL  10/8/2013    Procedure: HERNIORRHAPHY VENTRAL;  Ventral hernia repair with mesh;  Surgeon: Alice Cordova MD;  Location: [facility] OR     NO HISTORY OF SURGERY                 Social History:     Social History     Social History     Marital status:      Spouse name: N/A     Number of children: 2     Years of education: N/A     Occupational History     manager [employer]     Social History Main Topics     Smoking status: Former Smoker     Quit date: 1/1/2005     Smokeless tobacco: Never Used     Alcohol use 0.0 oz/week     0 Standard drinks or equivalent per week      Comment: one time a week     Drug use: No     Sexual activity: Yes     Partners: Male      Comment:  with vasectomy     Other Topics Concern     Parent/Sibling W/ Cabg, Mi Or Angioplasty Before 65f 55m? 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 Mother 65     at age 65, heart attack, s/p stents placement     Hypertension Mother      Diabetes Mother      Cardiovascular Mother      Family History Negative Father      alive 74     C.A.D. Maternal Grandmother 56     fatal MI     Obesity Maternal Grandfather      Hypertension Maternal Grandfather      Obesity Paternal Grandfather      Hypertension Paternal Grandfather      Family History Negative Sister      Family History Negative Sister      Family History Negative Brother      Family History Negative Brother      leukemia at age 18     Diabetes Sister      Obesity Brother      Breast Cancer No family hx of      Cancer - colorectal No family hx of               Allergies:      Allergies   Allergen Reactions     No Known Drug Allergies                Medications:     Prior to Admission medications    Medication Sig Last Dose Taking? Auth Provider   albuterol (PROAIR HFA/PROVENTIL HFA/VENTOLIN HFA) 108 (90 BASE) MCG/ACT Inhaler Inhale 2 puffs into the lungs every 4 hours as needed Past Month at Unknown time Yes Gladys Solis MD   aspirin EC 81 MG EC tablet Take 1 tablet (81 mg) by mouth daily 8/31/2018 at am Yes Hiram Gooden MD   atorvastatin (LIPITOR) 80 MG tablet Take 1 tablet (80 mg) by mouth daily 8/31/2018 at Unknown time Yes Dereje Castillo MD   carvedilol (COREG) 6.25 MG tablet Take 1 tablet (6.25 mg) by mouth 2 times daily 8/31/2018 at am Yes Dereje Castillo MD   levothyroxine (SYNTHROID/LEVOTHROID) 25 MCG tablet Take 1 tablet (25 mcg) by mouth daily 8/31/2018 at am Yes Gladys Solis MD   lisinopril (PRINIVIL/ZESTRIL) 2.5 MG tablet Take 1 tablet (2.5 mg) by mouth daily 8/31/2018 at am Yes Gladys Solis MD   nitroglycerin (NITROSTAT) 0.4 MG SL tablet Place 1 tablet (0.4  "mg) under the tongue every 5 minutes as needed for chest pain x2 Yes Gladys Solis MD   varenicline (CHANTIX) 1 MG tablet Take 1 mg by mouth daily 8/30/2018 at Unknown time Yes Unknown, Entered By History              Review of Systems:   A Comprehensive greater than 10 system review of systems was carried out.  Pertinent positives and negatives are noted above.  Otherwise negative for contributory information.            Physical Exam:   Blood pressure 158/81, pulse 66, temperature 97.8  F (36.6  C), temperature source Oral, resp. rate 18, height 1.676 m (5' 6\"), weight 100.2 kg (221 lb), last menstrual period 07/01/2013, SpO2 95 %, not currently breastfeeding.  Exam:  GENERAL:  Comfortable.  PSYCH: pleasant, oriented, No acute distress.  HEENT:  PERRLA. Normal conjunctiva, normal hearing, nasal mucosa and Oropharynx are normal.  NECK:  Supple, no neck vein distention, adenopathy or bruits, normal thyroid.  HEART:  Normal S1, S2 with no murmur, no pericardial rub, gallops or S3 or S4.  LUNGS:  Clear to auscultation, normal Respiratory effort. No wheezing, rales or ronchi.  ABDOMEN:  Soft, no hepatosplenomegaly, normal bowel sounds. Non-tender, non distended.   EXTREMITIES:  No pedal edema, +2 pulses bilateral and equal. Some swelling and bruising of right lower leg.  SKIN:  Dry to touch, No rash, wound or ulcerations.  NEUROLOGIC:  CN 2-12 grossly intact,sensation is intact with no focal deficits.               Data:       Recent Labs  Lab 08/31/18  1456   WBC 7.8   HGB 14.4   HCT 43.5   MCV 93          Recent Labs  Lab 08/31/18  1456      POTASSIUM 4.0   CHLORIDE 103   CO2 32   ANIONGAP 4   *   BUN 17   CR 0.58   GFRESTIMATED >90   GFRESTBLACK >90   JANET 9.0       Recent Labs  Lab 08/31/18  1516   TROPONIN 0.00         Recent Results (from the past 24 hour(s))   XR Chest 2 Views    Narrative    XR CHEST TWO VIEWS  8/31/2018 4:10 PM     HISTORY: Chest pain.    COMPARISON: " 11/3/2015    FINDINGS: Again there is borderline-minimal cardiac enlargement.      Impression    IMPRESSION: No acute consolidation.    ROSE MARY LINDA MD   XR Knee Right 1/2 Views    Narrative    XR RIGHT KNEE ONE-TWO VIEWS   8/31/2018 4:10 PM     HISTORY:  Knee injury.    FINDINGS: Anterior soft tissue swelling.      Impression    IMPRESSION: No fracture identified.    MD Aicha EVANS PA-C

## 2018-09-01 NOTE — PLAN OF CARE
"Problem: Patient Care Overview  Goal: Plan of Care/Patient Progress Review  PRIMARY DIAGNOSIS: CHEST PAIN  OUTPATIENT/OBSERVATION GOALS TO BE MET BEFORE DISCHARGE:  1. Ruled out acute coronary syndrome (negative or stable Troponin):  Yes-negative x3  2. Pain Status: Pain free-denies pain  3. Appropriate provocative testing performed: Yes-has stress test today  - Stress Test Procedure: Regular  - Interpretation of cardiac rhythm per telemetry tech: NSR    4. Cleared by Consultants (if applicable):N/A  5. Return to near baseline physical activity: Yes  Discharge Planner Nurse   Safe discharge environment identified: Yes  Barriers to discharge: Yes-Stress test results       Entered by: Veronica Thorne 09/01/2018 8:36 AM     /74 (BP Location: Right arm)  Pulse 78  Temp 96.2  F (35.7  C) (Oral)  Resp 16  Ht 1.676 m (5' 6\")  Wt 100.2 kg (221 lb)  LMP 07/01/2013  SpO2 94%  BMI 35.67 kg/m2   AOx4, up indept. VSS, ex HTN scheduled meds given. Tele NSR. On RA, LS clear. Denies pain, SOB, dizziness, nausea, or headache. Had Stress Test, results pending.      Please review provider order for any additional goals.   Nurse to notify provider when observation goals have been met and patient is ready for discharge.        "

## 2018-09-01 NOTE — PLAN OF CARE
"Problem: Patient Care Overview  Goal: Plan of Care/Patient Progress Review  PRIMARY DIAGNOSIS: CHEST PAIN  OUTPATIENT/OBSERVATION GOALS TO BE MET BEFORE DISCHARGE:  1. Ruled out acute coronary syndrome (negative or stable Troponin):  Yes  2. Pain Status: Pain free.  3. Appropriate provocative testing performed: Yes  - Stress Test Procedure: Echo  - Interpretation of cardiac rhythm per telemetry tech: SR HR 80's    4. Cleared by Consultants (if applicable):No  5. Return to near baseline physical activity: Yes  Discharge Planner Nurse   Safe discharge environment identified: Yes  Barriers to discharge: Yes       Entered by: Marsha Arango 08/31/2018 9:45 PM     Please review provider order for any additional goals.   Nurse to notify provider when observation goals have been met and patient is ready for discharge.  Patient is alert and oriented x4. BP elevated, but held all BP meds. Patient will have a stress echo in the AM. Tele in place. Patient denies any chest pain at this time. Patient tolerating diet and fluids. Patient is independent in room and able to make needs know by using the call light. Plan: Monitor trops, tele, stress echo in AM, and aspirin.   /83 (BP Location: Left arm)  Pulse 66  Temp 97.9  F (36.6  C) (Oral)  Resp 20  Ht 1.676 m (5' 6\")  Wt 100.2 kg (221 lb)  LMP 07/01/2013  SpO2 94%  BMI 35.67 kg/m2        "

## 2018-09-01 NOTE — PLAN OF CARE
Problem: Patient Care Overview  Goal: Plan of Care/Patient Progress Review  PRIMARY DIAGNOSIS: CHEST PAIN  OUTPATIENT/OBSERVATION GOALS TO BE MET BEFORE DISCHARGE:  1. Ruled out acute coronary syndrome (negative or stable Troponin): Yes, neg x2  2. Pain Status: Pain free.  3. Appropriate provocative testing performed: No, planned for AM  - Stress Test Procedure: Regular ex stress  - Interpretation of cardiac rhythm per telemetry tech: SR HR 67    4. Cleared by Consultants (if applicable): N/A  5. Return to near baseline physical activity: Yes  Pt A&Ox4, VSS ex /76. Pt up ind in room. Denies chest pain, SOB, or palpitations. Plan for ex stress in AM. Will continue to monitor.  Discharge Planner Nurse   Safe discharge environment identified: Yes  Barriers to discharge: Yes       Entered by: Sridevi Clay 09/01/2018 12:15 AM     Please review provider order for any additional goals.   Nurse to notify provider when observation goals have been met and patient is ready for discharge.

## 2018-09-01 NOTE — DISCHARGE SUMMARY
[facility] Outpatient / Observation Unit  Discharge Summary        Lizzie Chow MRN# [id number]   YOB: 1960 Age: 57 year old     Date of Admission: 8/31/2018  Date of Discharge: 9/1/2018  Admitting Physician: Gabe Van MD  Discharge Physician: Latoya Hanks PA-C  Discharging Service: Hospitalist      Primary Provider: Gladys Solis  Primary Care Physician Phone Number: [phone]         Primary Discharge Diagnoses:    Lizzie Chow is a 58 y/o female with PMH significant for NSTEMI s/p stent (06/2014) to proximal LAD, HTN, high cholesterol, and h/o tobacco abuse who was admitted on 8/31/2018 for concerns of acute chest pain.      1. Chest pain: Ruled out ACS. Suspect due to increased stress/anxiety.         Secondary Discharge Diagnoses:     Past Medical History:   Diagnosis Date     CAD (coronary artery disease) 6/15/14    MI, stent LAD      Chronic pain     both feet intermittently     Edema     furosemide     Elevated glucose      Fatty liver disease, nonalcoholic      HTN (hypertension)      Hyperlipidemia      Ischemic cardiomyopathy     EF 40-45%     Lumbago      Obesity (BMI 30-39.9)      Plantar fasciitis      Stress incontinence      Tobacco abuse      Umbilical hernia     has had surgical consult            Code Status:      Full Code        Brief Hospital Summary:        Reason for your hospital stay       You were admitted to the observation unit for chest pain. Your heart was   monitored overnight with no abnormal findings. Your cardiac enzymes were   negative for heart attack. You had an exercise stress test that was   negative for heart disease so we do not believe your chest pain was   related to your heart. Other possibilities include reflux, stress, and   musculoskeletal strain. We recommend you follow up with your primary   doctor if you continue to have pain and keep scheduled follow up with your   cardiologist this fall.                    Please refer to initial  admission history and physical for further details.   Briefly, Lizzie Chow was admitted on 8/31/2018 for concerns of acute chest pain. Initial work up in the ED did not reveal evidence of STEMI or findings consistent with unstable angina or acute coronary ischemia. Pt was registered to the Observation Unit for further evaluation.     Pt ruled out with serial troponins, underwent Stress Echo that did not show evidence of significant coronary ischemia. Labs were reviewed and significant results addressed. On the day of discharge, pt was pain free, with no complaints of pain. Medications were reviewed and adjustments made as necessary. Pt is instructed to follow up as below.           Significant Lab During Hospitalization:        Recent Labs  Lab 08/31/18  2315 08/31/18  1938 08/31/18  1516   TROPONIN  --   --  0.00   TROPI <0.015 <0.015  --             Significant Imaging During Hospitalization:      Results for orders placed or performed during the hospital encounter of 08/31/18   XR Chest 2 Views    Narrative    XR CHEST TWO VIEWS  8/31/2018 4:10 PM     HISTORY: Chest pain.    COMPARISON: 11/3/2015    FINDINGS: Again there is borderline-minimal cardiac enlargement.      Impression    IMPRESSION: No acute consolidation.    ROSE MARY LINDA MD   XR Knee Right 1/2 Views    Narrative    XR RIGHT KNEE ONE-TWO VIEWS   8/31/2018 4:10 PM     HISTORY:  Knee injury.    FINDINGS: Anterior soft tissue swelling.      Impression    IMPRESSION: No fracture identified.    ROSE MARY LINDA MD     Stress echocardiogram:  Interpretation Summary  Contrast was used without apparent complications. This was a normal stress  echocardiogram with no evidence of stress-induced ischemia.         Pending Results:      None        Consultations This Hospital Stay:      No consultations were requested during this admission         Discharge Instructions and Follow-Up:      Follow-up Appointments     Follow-up and recommended labs and tests        Follow  up with primary care provider, Gladys Solis, within 7 days   for hospital follow- up.  No follow up labs or test are needed.                      Discharge Disposition:      Discharged to home         Discharge Medications:        Current Discharge Medication List      CONTINUE these medications which have NOT CHANGED    Details   albuterol (PROAIR HFA/PROVENTIL HFA/VENTOLIN HFA) 108 (90 BASE) MCG/ACT Inhaler Inhale 2 puffs into the lungs every 4 hours as needed  Qty: 1 Inhaler, Refills: 1    Associated Diagnoses: Cough      aspirin EC 81 MG EC tablet Take 1 tablet (81 mg) by mouth daily  Qty: 30 tablet, Refills: 11    Associated Diagnoses: CAD (coronary artery disease)      atorvastatin (LIPITOR) 80 MG tablet Take 1 tablet (80 mg) by mouth daily  Qty: 90 tablet, Refills: 3    Associated Diagnoses: Mixed hyperlipidemia      carvedilol (COREG) 6.25 MG tablet Take 1 tablet (6.25 mg) by mouth 2 times daily  Qty: 180 tablet, Refills: 3    Associated Diagnoses: Coronary artery disease involving native coronary artery with unstable angina pectoris (H)      levothyroxine (SYNTHROID/LEVOTHROID) 25 MCG tablet Take 1 tablet (25 mcg) by mouth daily  Qty: 90 tablet, Refills: 3    Associated Diagnoses: Acquired hypothyroidism      lisinopril (PRINIVIL/ZESTRIL) 2.5 MG tablet Take 1 tablet (2.5 mg) by mouth daily  Qty: 90 tablet, Refills: 3    Associated Diagnoses: Benign essential hypertension      nitroglycerin (NITROSTAT) 0.4 MG SL tablet Place 1 tablet (0.4 mg) under the tongue every 5 minutes as needed for chest pain  Qty: 25 tablet, Refills: 1    Associated Diagnoses: Coronary artery disease of native heart with stable angina pectoris, unspecified vessel or lesion type (H)      varenicline (CHANTIX) 1 MG tablet Take 1 mg by mouth daily               Allergies:         Allergies   Allergen Reactions     No Known Drug Allergies            Condition and Physical on Discharge:      Discharge condition: Stable   Vitals:  "Blood pressure 156/74, pulse 78, temperature 96.2  F (35.7  C), temperature source Oral, resp. rate 16, height 1.676 m (5' 6\"), weight 100.2 kg (221 lb), last menstrual period 07/01/2013, SpO2 94 %, not currently breastfeeding.  221 lbs 0 oz      GENERAL:  Comfortable.  PSYCH: pleasant, oriented, No acute distress.  HEENT:  PERRLA. Normal conjunctiva, normal hearing, nasal mucosa and oropharynx are normal.  NECK:  Supple, no neck vein distention, adenopathy or bruits, normal thyroid.  HEART:  Normal S1, S2 with no murmur, no pericardial rub, gallops or S3 or S4.  LUNGS:  Clear to auscultation, normal respiratory effort. No wheezing, rales or ronchi.  ABDOMEN:  Soft, no hepatosplenomegaly, normal bowel sounds. Non-tender, non distended.   EXTREMITIES:  No pedal edema, +2 radial and posterior tibial pulses bilateral and equal.  SKIN:  Dry to touch, No rash, wound or ulcerations.  NEUROLOGIC:  CN 2-12 intact, BL 5/5 symmetric upper and lower extremity strength, sensation is intact with no focal deficits.     Latoya Hanks PA-C  "

## 2018-09-01 NOTE — PROGRESS NOTES
"/75 (BP Location: Right arm)  Pulse 78  Temp 97.1  F (36.2  C) (Oral)  Resp 16  Ht 1.676 m (5' 6\")  Wt 100.2 kg (221 lb)  LMP 07/01/2013  SpO2 95%  BMI 35.67 kg/m2  AOx4, up indept. VSS on RA. Tele NSR. Ambulated halls. Denies chest pain, SOB, dizziness, nausea. Had normal stress test. Per MD ok to dc home. IV removed. Discharge instructions reviewed with patient. Patient verbalizes understanding, all questions were answered. Patient discharged with all personal belongings and discharge medications.  arrived to transport to home    OBSERVATION patient END time: 1155      "

## 2018-09-04 ENCOUNTER — TELEPHONE (OUTPATIENT)
Dept: FAMILY MEDICINE | Facility: CLINIC | Age: 58
End: 2018-09-04

## 2018-09-04 NOTE — TELEPHONE ENCOUNTER
"Hospital/TCU/ED for chronic condition Discharge Protocol    \"Hi, my name is Timmy Robles, a registered nurse, and I am calling from [facility].  I am calling to follow up and see how things are going for you after your recent emergency visit/hospital/TCU stay.\"    Tell me how you are doing now that you are home?\" feeling alright        Discharge Instructions    \"Let's review your discharge instructions.  What is/are the follow-up recommendations?  Pt. Response: follow up with PCP    \"Has an appointment with your primary care provider been scheduled?\"   No (schedule appointment)    \"When you see the provider, I would recommend that you bring your medications with you.\"    Medications    \"Tell me what changed about your medicines when you discharged?\"    Changes to chronic meds?    0-1    \"What questions do you have about your medications?\"    None     New diagnoses of heart failure, COPD, diabetes, or MI?    No              Medication reconciliation completed? Yes  Was MTM referral placed (*Make sure to put transitions as reason for referral)?   No    Call Summary    \"What questions or concerns do you have about your recent visit and your follow-up care?\"     none    \"If you have questions or things don't continue to improve, we encourage you contact us through the main clinic number (give number).  Even if the clinic is not open, triage nurses are available 24/7 to help you.     We would like you to know that our clinic has extended hours (provide information).  We also have urgent care (provide details on closest location and hours/contact info)\"      \"Thank you for your time and take care!\"    Timmy Robles RN, BSN           "

## 2018-09-04 NOTE — TELEPHONE ENCOUNTER
09/01/2018 chest pain due to myocardial ischemia, unspecified ischemic chest pain type  No future appt  Dory Limon

## 2018-09-12 ENCOUNTER — OFFICE VISIT (OUTPATIENT)
Dept: FAMILY MEDICINE | Facility: CLINIC | Age: 58
End: 2018-09-12
Payer: COMMERCIAL

## 2018-09-12 VITALS
HEART RATE: 77 BPM | BODY MASS INDEX: 35.59 KG/M2 | DIASTOLIC BLOOD PRESSURE: 70 MMHG | OXYGEN SATURATION: 96 % | TEMPERATURE: 98.3 F | SYSTOLIC BLOOD PRESSURE: 131 MMHG | WEIGHT: 220.5 LBS

## 2018-09-12 DIAGNOSIS — I25.10 CORONARY ARTERY DISEASE INVOLVING NATIVE CORONARY ARTERY, ANGINA PRESENCE UNSPECIFIED, UNSPECIFIED WHETHER NATIVE OR TRANSPLANTED HEART: ICD-10-CM

## 2018-09-12 DIAGNOSIS — Z87.891 FORMER SMOKER: ICD-10-CM

## 2018-09-12 DIAGNOSIS — R07.89 OTHER CHEST PAIN: ICD-10-CM

## 2018-09-12 DIAGNOSIS — Z23 ENCOUNTER FOR IMMUNIZATION: ICD-10-CM

## 2018-09-12 DIAGNOSIS — E78.5 HYPERLIPIDEMIA WITH TARGET LDL LESS THAN 100: ICD-10-CM

## 2018-09-12 DIAGNOSIS — Z09 HOSPITAL DISCHARGE FOLLOW-UP: Primary | ICD-10-CM

## 2018-09-12 PROBLEM — E66.01 MORBID OBESITY (H): Status: ACTIVE | Noted: 2018-09-12

## 2018-09-12 PROCEDURE — 90715 TDAP VACCINE 7 YRS/> IM: CPT | Performed by: FAMILY MEDICINE

## 2018-09-12 PROCEDURE — 99495 TRANSJ CARE MGMT MOD F2F 14D: CPT | Performed by: FAMILY MEDICINE

## 2018-09-12 PROCEDURE — 90471 IMMUNIZATION ADMIN: CPT | Performed by: FAMILY MEDICINE

## 2018-09-12 NOTE — PROGRESS NOTES
SUBJECTIVE:   Lizzie Chow is a 57 year old female who presents to clinic today for the following health issues:          Hospital Follow-up Visit:    Hospital/Nursing Home/IP Rehab Facility: [facility]  Date of Admission: 8/31/2018  Date of Discharge: 9/1/2018  Reason(s) for Admission: Chest pain, dizziness            Problems taking medications regularly:  None       Medication changes since discharge: None       Problems adhering to non-medication therapy:  None    Summary of hospitalization:  [facility] discharge summary reviewed  Diagnostic Tests/Treatments reviewed.  Follow up needed: none  Other Healthcare Providers Involved in Patient s Care:         None  Update since discharge: improved.     Admitted with chest pain and lightheadedness. Normal stress echo and negative troponins.   Her chest pain has fully resolved. Attributes to stress.     Post Discharge Medication Reconciliation: discharge medications reconciled, continue medications without change.  Plan of care communicated with patient     Coding guidelines for this visit:  Type of Medical   Decision Making Face-to-Face Visit       within 7 Days of discharge Face-to-Face Visit        within 14 days of discharge   Moderate Complexity [id number]   High Complexity [id number]            Problem list and histories reviewed & adjusted, as indicated.  Additional history: none    Patient Active Problem List   Diagnosis     Former smoker     Flat feet     Family history of coronary artery disease     Fatty liver     Plantar fasciitis     Hypertension goal BP (blood pressure) < 140/90     NSTEMI (non-ST elevated myocardial infarction) (H)     CAD (coronary artery disease)     Ischemic cardiomyopathy     Hyperlipidemia with target LDL less than 100     Menopausal state     Type 2 diabetes, HbA1c goal < 7% (H)     Advanced directives, counseling/discussion     Benign essential hypertension     Acquired hypothyroidism     Type 2 diabetes  mellitus without complication, without long-term current use of insulin (H)     Past Surgical History:   Procedure Laterality Date     CARDIAC SURGERY      stent     COLONOSCOPY N/A 12/8/2016    Procedure: COLONOSCOPY;  Surgeon: Ranjith Serrano MD;  Location: [facility]     HERNIORRHAPHY VENTRAL  10/8/2013    Procedure: HERNIORRHAPHY VENTRAL;  Ventral hernia repair with mesh;  Surgeon: Alice Cordova MD;  Location: [facility] OR     NO HISTORY OF SURGERY         Social History   Substance Use Topics     Smoking status: Former Smoker     Quit date: 1/1/2005     Smokeless tobacco: Never Used     Alcohol use 0.0 oz/week     0 Standard drinks or equivalent per week      Comment: one time a week     Family History   Problem Relation Age of Onset     Lipids Mother      alive 73     C.A.D. Mother 65     at age 65, heart attack, s/p stents placement     Hypertension Mother      Diabetes Mother      Cardiovascular Mother      Family History Negative Father      alive 74     C.A.D. Maternal Grandmother 56     fatal MI     Obesity Maternal Grandfather      Hypertension Maternal Grandfather      Obesity Paternal Grandfather      Hypertension Paternal Grandfather      Family History Negative Sister      Family History Negative Sister      Family History Negative Brother      Family History Negative Brother      leukemia at age 18     Diabetes Sister      Obesity Brother      Breast Cancer No family hx of      Cancer - colorectal No family hx of            Reviewed and updated as needed this visit by clinical staff       Reviewed and updated as needed this visit by Provider         ROS:  Constitutional, HEENT, cardiovascular, pulmonary, gi and gu systems are negative, except as otherwise noted.    OBJECTIVE:     /70 (BP Location: Right arm, Patient Position: Chair, Cuff Size: Adult Regular)  Pulse 77  Temp 98.3  F (36.8  C) (Oral)  Wt 220 lb 8 oz (100 kg)  LMP 07/01/2013  SpO2 96%  BMI 35.59 kg/m2  Body mass index is  35.59 kg/(m^2).  GENERAL: healthy, alert and no distress  RESP: lungs clear to auscultation - no rales, rhonchi or wheezes  CV: regular rate and rhythm, normal S1 S2, no S3 or S4, no murmur, click or rub, no peripheral edema and peripheral pulses strong  PSYCH: mentation appears normal, affect normal/bright    Diagnostic Test Results:  none     ASSESSMENT/PLAN:     1. Hospital discharge follow-up    2. Other chest pain - ACS rule out, negative stress testing    3. Coronary artery disease involving native coronary artery, angina presence unspecified, unspecified whether native or transplanted heart - on statin, ASA    4. Encounter for immunization  - TDAP, IM (10 - 64 YRS) - Adacel  - ADMIN 1st VACCINE    5. Former smoker    6. Hyperlipidemia with target LDL less than 100      Gladys Solis MD  [facility]

## 2018-09-12 NOTE — MR AVS SNAPSHOT
After Visit Summary   9/12/2018    Lizzie Chow    MRN: [id number]           Patient Information     Date Of Birth          1960        Visit Information        Provider Department      9/12/2018 11:40 AM Gladys Solis MD [facility]        Today's Diagnoses     Hospital discharge follow-up    -  1    Other chest pain        Coronary artery disease involving native coronary artery, angina presence unspecified, unspecified whether native or transplanted heart        Encounter for immunization        Former smoker        Hyperlipidemia with target LDL less than 100           Follow-ups after your visit        Follow-up notes from your care team     Return in about 1 month (around 10/12/2018) for Diabetes Visit.      Your next 10 appointments already scheduled     Oct 24, 2018  7:20 AM CDT   Office Visit with Gladys Solis MD   [facility] ([facility]    [id number] [place] [id number] [phone]           Bring a current list of meds and any records pertaining to this visit. For Physicals, please bring immunization records and any forms needing to be filled out. Please arrive 10 minutes early to complete paperwork.              Who to contact     If you have questions or need follow up information about today's clinic visit or your schedule please contact [facility] directly at [phone].  Normal or non-critical lab and imaging results will be communicated to you by MyChart, letter or phone within 4 business days after the clinic has received the results. If you do not hear from us within 7 days, please contact the clinic through MyChart or phone. If you have a critical or abnormal lab result, we will notify you by phone as soon as possible.  Submit refill requests through [employer] or call your pharmacy and they will forward the refill request to us. Please allow 3 business days for your refill to be  completed.          Additional Information About Your Visit        [facility] Information     [employer] gives you secure access to your electronic health record. If you see a primary care provider, you can also send messages to your care team and make appointments. If you have questions, please call your primary care clinic.  If you do not have a primary care provider, please call [phone] and they will assist you.        Care EveryWhere ID     This is your Care EveryWhere ID. This could be used by other organizations to access your [facility] medical records  [id number]        Your Vitals Were     Pulse Temperature Last Period Pulse Oximetry BMI (Body Mass Index)       77 98.3  F (36.8  C) (Oral) 07/01/2013 96% 35.59 kg/m2        Blood Pressure from Last 3 Encounters:   09/12/18 131/70   09/01/18 149/75   07/25/18 130/86    Weight from Last 3 Encounters:   09/12/18 220 lb 8 oz (100 kg)   08/31/18 221 lb (100.2 kg)   07/25/18 221 lb (100.2 kg)              We Performed the Following     ADMIN 1st VACCINE     TDAP, IM (10 - 64 YRS) - Adacel        Primary Care Provider Office Phone # Fax #    Gladys Julián Solis [phone] [address] [employer] [address]        Equal Access to Services     CLIVE ADAMS : Hadii aad ku hadasho Soomaali, waaxda luqadaha, qaybta kaalmada adeegyada, waxay idiin hayaan alia rios . So [facility] [phone].    ATENCIÓN: Si habla español, tiene a mcdonald disposición servicios gratuitos de asistencia lingüística. Llame al [phone].    We comply with applicable federal civil rights laws and [place] laws. We do not discriminate on the basis of race, color, national origin, age, disability, sex, sexual orientation, or gender identity.            Thank you!     Thank you for choosing [facility]  for your care. Our goal is always to provide you with excellent care. Hearing back from our patients is one way we can continue to improve our  services. Please take a few minutes to complete the written survey that you may receive in the mail after your visit with us. Thank you!             Your Updated Medication List - Protect others around you: Learn how to safely use, store and throw away your medicines at www.disposemymeds.org.          This list is accurate as of 9/12/18 12:39 PM.  Always use your most recent med list.                   Brand Name Dispense Instructions for use Diagnosis    albuterol 108 (90 Base) MCG/ACT inhaler    PROAIR HFA/PROVENTIL HFA/VENTOLIN HFA    1 Inhaler    Inhale 2 puffs into the lungs every 4 hours as needed    Cough       aspirin 81 MG EC tablet     30 tablet    Take 1 tablet (81 mg) by mouth daily    CAD (coronary artery disease)       atorvastatin 80 MG tablet    LIPITOR    90 tablet    Take 1 tablet (80 mg) by mouth daily    Mixed hyperlipidemia       carvedilol 6.25 MG tablet    COREG    180 tablet    Take 1 tablet (6.25 mg) by mouth 2 times daily    Coronary artery disease involving native coronary artery with unstable angina pectoris (H)       levothyroxine 25 MCG tablet    SYNTHROID/LEVOTHROID    90 tablet    Take 1 tablet (25 mcg) by mouth daily    Acquired hypothyroidism       lisinopril 2.5 MG tablet    PRINIVIL/Zestril    90 tablet    Take 1 tablet (2.5 mg) by mouth daily    Benign essential hypertension       nitroGLYcerin 0.4 MG sublingual tablet    NITROSTAT    25 tablet    Place 1 tablet (0.4 mg) under the tongue every 5 minutes as needed for chest pain    Coronary artery disease of native heart with stable angina pectoris, unspecified vessel or lesion type (H)       varenicline 1 MG tablet    CHANTIX     Take 1 mg by mouth daily

## 2018-09-12 NOTE — NURSING NOTE
Screening Questionnaire for Adult Immunization    Are you sick today?   No   Do you have allergies to medications, food, a vaccine component or latex?   No   Have you ever had a serious reaction after receiving a vaccination?   No   Do you have a long-term health problem with heart disease, lung disease, asthma, kidney disease, metabolic disease (e.g. diabetes), anemia, or other blood disorder?   No   Do you have cancer, leukemia, HIV/AIDS, or any other immune system problem?   No   In the past 3 months, have you taken medications that affect  your immune system, such as prednisone, other steroids, or anticancer drugs; drugs for the treatment of rheumatoid arthritis, Crohn s disease, or psoriasis; or have you had radiation treatments?   No   Have you had a seizure, or a brain or other nervous system problem?   No   During the past year, have you received a transfusion of blood or blood     products, or been given immune (gamma) globulin or antiviral drug?   No   For women: Are you pregnant or is there a chance you could become        pregnant during the next month?   No   Have you received any vaccinations in the past 4 weeks?   No     Immunization questionnaire answers were all negative.      MNVFC doesn't apply on this patient    Per orders of Dr. Solis, injection of Tdap given by Meera Turner. Patient instructed to remain in clinic for 20 minutes afterwards, and to report any adverse reaction to me immediately.       Screening performed by Meera Turner on 9/12/2018 at 12:22 PM.

## 2018-09-13 ENCOUNTER — TELEPHONE (OUTPATIENT)
Dept: FAMILY MEDICINE | Facility: CLINIC | Age: 58
End: 2018-09-13

## 2018-09-13 NOTE — TELEPHONE ENCOUNTER
Panel Management Review      Patient has the following on her problem list:     Diabetes    ASA: Passed    Last A1C  Lab Results   Component Value Date    A1C 6.5 07/25/2018    A1C 6.5 03/23/2018    A1C 6.3 09/19/2017    A1C 6.1 11/03/2016    A1C 6.5 06/27/2016     A1C tested: Passed    Last LDL:    Lab Results   Component Value Date    CHOL 129 07/25/2018     Lab Results   Component Value Date    HDL 46 07/25/2018     Lab Results   Component Value Date    LDL 45 07/25/2018     Lab Results   Component Value Date    TRIG 188 07/25/2018     Lab Results   Component Value Date    CHOLHDLRATIO 2.8 09/01/2015     Lab Results   Component Value Date    NHDL 83 07/25/2018       Is the patient on a Statin? YES             Is the patient on Aspirin? YES    Medications     HMG CoA Reductase Inhibitors    atorvastatin (LIPITOR) 80 MG tablet    Salicylates    aspirin EC 81 MG EC tablet          Last three blood pressure readings:  BP Readings from Last 3 Encounters:   09/12/18 131/70   09/01/18 149/75   07/25/18 130/86       Date of last diabetes office visit: 7-25-18     Tobacco History:     History   Smoking Status     Former Smoker     Quit date: 1/1/2005   Smokeless Tobacco     Never Used           Composite cancer screening  Chart review shows that this patient is due/due soon for the following Mammogram  Summary:    Patient is due/failing the following:   MAMMOGRAM    Action needed:   Mammogram due    Type of outreach:    not at this time as pt has up coming appointment and will review Hm at that time    Questions for provider review:    None                                                                                                                                    Esteban Soria [facility]           Chart routed to none .

## 2018-10-15 DIAGNOSIS — I10 BENIGN ESSENTIAL HYPERTENSION: ICD-10-CM

## 2018-10-15 RX ORDER — LISINOPRIL 2.5 MG/1
2.5 TABLET ORAL DAILY
Qty: 90 TABLET | Refills: 3 | Status: SHIPPED | OUTPATIENT
Start: 2018-10-15 | End: 2019-11-13

## 2018-10-15 NOTE — TELEPHONE ENCOUNTER
Prescription approved per [facility] Refill Protocol.  Timmy Robles RN, BSN

## 2018-12-19 ENCOUNTER — OFFICE VISIT (OUTPATIENT)
Dept: FAMILY MEDICINE | Facility: CLINIC | Age: 58
End: 2018-12-19
Payer: COMMERCIAL

## 2018-12-19 VITALS
HEART RATE: 67 BPM | WEIGHT: 220 LBS | DIASTOLIC BLOOD PRESSURE: 80 MMHG | TEMPERATURE: 98.2 F | SYSTOLIC BLOOD PRESSURE: 134 MMHG | BODY MASS INDEX: 35.36 KG/M2 | HEIGHT: 66 IN

## 2018-12-19 DIAGNOSIS — J01.00 ACUTE NON-RECURRENT MAXILLARY SINUSITIS: Primary | ICD-10-CM

## 2018-12-19 DIAGNOSIS — Z87.891 FORMER SMOKER: ICD-10-CM

## 2018-12-19 DIAGNOSIS — E03.9 ACQUIRED HYPOTHYROIDISM: ICD-10-CM

## 2018-12-19 DIAGNOSIS — I10 BENIGN ESSENTIAL HYPERTENSION: ICD-10-CM

## 2018-12-19 DIAGNOSIS — E11.9 TYPE 2 DIABETES MELLITUS WITHOUT COMPLICATION, WITHOUT LONG-TERM CURRENT USE OF INSULIN (H): ICD-10-CM

## 2018-12-19 DIAGNOSIS — I25.118 CORONARY ARTERY DISEASE OF NATIVE HEART WITH STABLE ANGINA PECTORIS, UNSPECIFIED VESSEL OR LESION TYPE (H): ICD-10-CM

## 2018-12-19 PROCEDURE — 99214 OFFICE O/P EST MOD 30 MIN: CPT | Performed by: FAMILY MEDICINE

## 2018-12-19 RX ORDER — LEVOTHYROXINE SODIUM 25 UG/1
25 TABLET ORAL DAILY
Qty: 90 TABLET | Refills: 3 | Status: SHIPPED | OUTPATIENT
Start: 2018-12-19 | End: 2019-12-03

## 2018-12-19 RX ORDER — CARVEDILOL 6.25 MG/1
6.25 TABLET ORAL 2 TIMES DAILY
Qty: 180 TABLET | Refills: 3 | Status: SHIPPED | OUTPATIENT
Start: 2018-12-19 | End: 2019-12-03

## 2018-12-19 RX ORDER — NITROGLYCERIN 0.4 MG/1
0.4 TABLET SUBLINGUAL EVERY 5 MIN PRN
Qty: 25 TABLET | Refills: 1 | Status: SHIPPED | OUTPATIENT
Start: 2018-12-19 | End: 2020-08-28

## 2018-12-19 ASSESSMENT — MIFFLIN-ST. JEOR: SCORE: 1594.66

## 2018-12-19 NOTE — PROGRESS NOTES
SUBJECTIVE:   Lizzie Chow is a 58 year old female who presents to clinic today for the following health issues:    Soreness roof of mouth and occasionally tongue.     Raw sensation, like she ate hot food.     Was seen in [facility] 2 weeks ago, strep was negative.     Has had ongoing sinus issues since July. Has tried an antihistamine with no improvement. Sinus drainage is her main complaint.         Problem list and histories reviewed & adjusted, as indicated.  Additional history: as documented    Patient Active Problem List   Diagnosis     Former smoker     Flat feet     Family history of coronary artery disease     Fatty liver     Plantar fasciitis     Hypertension goal BP (blood pressure) < 140/90     NSTEMI (non-ST elevated myocardial infarction) (H)     Ischemic cardiomyopathy     Hyperlipidemia with target LDL less than 100     Menopausal state     Type 2 diabetes, HbA1c goal < 7% (H)     Advanced directives, counseling/discussion     Benign essential hypertension     Acquired hypothyroidism     Type 2 diabetes mellitus without complication, without long-term current use of insulin (H)     Obesity (BMI 35.0-39.9) with comorbidity (H)     Coronary artery disease involving native coronary artery, angina presence unspecified, unspecified whether native or transplanted heart     Past Surgical History:   Procedure Laterality Date     CARDIAC SURGERY      stent     COLONOSCOPY N/A 2016    Procedure: COLONOSCOPY;  Surgeon: Ranjith Serrano MD;  Location: [facility]     HERNIORRHAPHY VENTRAL  10/8/2013    Procedure: HERNIORRHAPHY VENTRAL;  Ventral hernia repair with mesh;  Surgeon: Alice Cordova MD;  Location: [facility] OR     NO HISTORY OF SURGERY         Social History     Tobacco Use     Smoking status: Former Smoker     Last attempt to quit: 2005     Years since quittin.9     Smokeless tobacco: Never Used   Substance Use Topics     Alcohol use: Yes     Alcohol/week: 0.0 oz     Comment: one time  "a week     Family History   Problem Relation Age of Onset     Lipids Mother         alive 73     C.A.D. Mother 65        at age 65, heart attack, s/p stents placement     Hypertension Mother      Diabetes Mother      Cardiovascular Mother      Family History Negative Father         alive 74     C.A.D. Maternal Grandmother 56        fatal MI     Obesity Maternal Grandfather      Hypertension Maternal Grandfather      Obesity Paternal Grandfather      Hypertension Paternal Grandfather      Family History Negative Sister      Family History Negative Sister      Family History Negative Brother      Family History Negative Brother         leukemia at age 18     Diabetes Sister      Obesity Brother      Breast Cancer No family hx of      Cancer - colorectal No family hx of            Reviewed and updated as needed this visit by clinical staff       Reviewed and updated as needed this visit by Provider         ROS:  Constitutional, HEENT, cardiovascular, pulmonary, gi and gu systems are negative, except as otherwise noted.    OBJECTIVE:     /80   Pulse 67   Temp 98.2  F (36.8  C) (Oral)   Ht 1.676 m (5' 6\")   Wt 99.8 kg (220 lb)   LMP 07/01/2013   Breastfeeding? No   BMI 35.51 kg/m    Body mass index is 35.51 kg/m .  GENERAL: healthy, alert and no distress  EYES: Eyes grossly normal to inspection, conjunctivae and sclerae normal  HENT: serous effusion bilateral middle ears, beefy pharyngitis with no tonsillar erythema or exudate  NECK: no adenopathy  RESP: lungs clear to auscultation - no rales, rhonchi or wheezes  CV: regular rate and rhythm, normal S1 S2, no S3 or S4, no murmur    Diagnostic Test Results:  none     ASSESSMENT/PLAN:     1. Acute non-recurrent maxillary sinusitis - given length of symptoms, will treat with abx  - amoxicillin-clavulanate (AUGMENTIN) 875-125 MG tablet; Take 1 tablet by mouth 2 times daily for 10 days  Dispense: 20 tablet; Refill: 0    2. Coronary artery disease of native heart " with stable angina pectoris, unspecified vessel or lesion type (H) - stable, overdue for cardiology visit, advised to schedule  - nitroGLYcerin (NITROSTAT) 0.4 MG sublingual tablet; Place 1 tablet (0.4 mg) under the tongue every 5 minutes as needed for chest pain  Dispense: 25 tablet; Refill: 1  - carvedilol (COREG) 6.25 MG tablet; Take 1 tablet (6.25 mg) by mouth 2 times daily  Dispense: 180 tablet; Refill: 3    3. Acquired hypothyroidism - normal levels in July, refills  - levothyroxine (SYNTHROID/LEVOTHROID) 25 MCG tablet; Take 1 tablet (25 mcg) by mouth daily  Dispense: 90 tablet; Refill: 3    4. Benign essential hypertension - in range on recheck, continue current    5. Former smoker - 6 months, encouraged to continue with this    6. Type 2 diabetes mellitus without complication, without long-term current use of insulin (H) - will recheck DM in January    Gladys Solis MD  [facility]

## 2018-12-26 ENCOUNTER — TELEPHONE (OUTPATIENT)
Dept: FAMILY MEDICINE | Facility: CLINIC | Age: 58
End: 2018-12-26

## 2018-12-26 NOTE — TELEPHONE ENCOUNTER
Panel Management Review      Patient has the following on her problem list:   Diabetes    ASA: Passed    Last A1C  Lab Results   Component Value Date    A1C 6.5 07/25/2018    A1C 6.5 03/23/2018    A1C 6.3 09/19/2017    A1C 6.1 11/03/2016    A1C 6.5 06/27/2016     A1C tested: Passed    Last LDL:    Lab Results   Component Value Date    CHOL 129 07/25/2018     Lab Results   Component Value Date    HDL 46 07/25/2018     Lab Results   Component Value Date    LDL 45 07/25/2018     Lab Results   Component Value Date    TRIG 188 07/25/2018     Lab Results   Component Value Date    CHOLHDLRATIO 2.8 09/01/2015     Lab Results   Component Value Date    NHDL 83 07/25/2018       Is the patient on a Statin? YES             Is the patient on Aspirin? YES    Medications     HMG CoA Reductase Inhibitors    atorvastatin (LIPITOR) 80 MG tablet    Salicylates    aspirin EC 81 MG EC tablet          Last three blood pressure readings:  BP Readings from Last 3 Encounters:   12/19/18 134/80   09/12/18 131/70   09/01/18 149/75       Date of last diabetes office visit: 7-25-18     Tobacco History:     History   Smoking Status     Former Smoker     Quit date: 1/1/2005   Smokeless Tobacco     Never Used           Composite cancer screening  Chart review shows that this patient is due/due soon for the following Mammogram  Summary:    Patient is due/failing the following:   MAMMOGRAM    Action needed:   None at this time as pt has future scheduled appointment    Type of outreach:    na    Questions for provider review:    None                                                                                                                                    Esteban Soria [facility]           Chart routed to none .

## 2019-01-27 DIAGNOSIS — R05.9 COUGH: ICD-10-CM

## 2019-01-27 NOTE — TELEPHONE ENCOUNTER
"Requested Prescriptions   Pending Prescriptions Disp Refills     albuterol (PROAIR HFA/PROVENTIL HFA/VENTOLIN HFA) 108 (90 Base) MCG/ACT inhaler  Last Written Prescription Date:  03/27/2018  Last Fill Quantity: 1,  # refills: 1   Last office visit: 12/19/2018 with prescribing provider:  12/19/2018   Future Office Visit:     1 Inhaler 1     Sig: Inhale 2 puffs into the lungs every 4 hours as needed    Asthma Maintenance Inhalers - Anticholinergics Passed - 1/27/2019 11:55 AM       Passed - Patient is age 12 years or older       Passed - Recent (12 mo) or future (30 days) visit within the authorizing provider's specialty    Patient had office visit in the last 12 months or has a visit in the next 30 days with authorizing provider or within the authorizing provider's specialty.  See \"Patient Info\" tab in inbasket, or \"Choose Columns\" in Meds & Orders section of the refill encounter.             Passed - Medication is active on med list          "

## 2019-01-28 RX ORDER — ALBUTEROL SULFATE 90 UG/1
2 AEROSOL, METERED RESPIRATORY (INHALATION) EVERY 4 HOURS PRN
Qty: 1 INHALER | Refills: 0 | Status: SHIPPED | OUTPATIENT
Start: 2019-01-28 | End: 2019-02-20

## 2019-01-28 NOTE — TELEPHONE ENCOUNTER
Medication is being filled for 1 time refill only due to:  Patient needs to be seen because pst due for Dm appt.  [employer] sent.     Timmy Robles RN, BSN

## 2019-02-19 ENCOUNTER — ANCILLARY PROCEDURE (OUTPATIENT)
Dept: GENERAL RADIOLOGY | Facility: CLINIC | Age: 59
End: 2019-02-19
Attending: FAMILY MEDICINE
Payer: COMMERCIAL

## 2019-02-19 ENCOUNTER — OFFICE VISIT (OUTPATIENT)
Dept: FAMILY MEDICINE | Facility: CLINIC | Age: 59
End: 2019-02-19
Payer: COMMERCIAL

## 2019-02-19 VITALS
SYSTOLIC BLOOD PRESSURE: 150 MMHG | RESPIRATION RATE: 20 BRPM | TEMPERATURE: 98.2 F | BODY MASS INDEX: 34.96 KG/M2 | WEIGHT: 217.5 LBS | DIASTOLIC BLOOD PRESSURE: 80 MMHG | HEIGHT: 66 IN | HEART RATE: 73 BPM | OXYGEN SATURATION: 91 %

## 2019-02-19 DIAGNOSIS — J98.01 ACUTE BRONCHOSPASM: ICD-10-CM

## 2019-02-19 DIAGNOSIS — J98.01 ACUTE BRONCHOSPASM: Primary | ICD-10-CM

## 2019-02-19 DIAGNOSIS — E11.9 TYPE 2 DIABETES MELLITUS WITHOUT COMPLICATION, WITHOUT LONG-TERM CURRENT USE OF INSULIN (H): ICD-10-CM

## 2019-02-19 DIAGNOSIS — R06.02 SHORTNESS OF BREATH: ICD-10-CM

## 2019-02-19 PROCEDURE — 71046 X-RAY EXAM CHEST 2 VIEWS: CPT

## 2019-02-19 PROCEDURE — 94640 AIRWAY INHALATION TREATMENT: CPT | Performed by: FAMILY MEDICINE

## 2019-02-19 PROCEDURE — 99215 OFFICE O/P EST HI 40 MIN: CPT | Mod: 25 | Performed by: FAMILY MEDICINE

## 2019-02-19 RX ORDER — PREDNISONE 20 MG/1
40 TABLET ORAL DAILY
Qty: 8 TABLET | Refills: 0 | Status: SHIPPED | OUTPATIENT
Start: 2019-02-20 | End: 2019-03-20

## 2019-02-19 RX ORDER — IPRATROPIUM BROMIDE AND ALBUTEROL SULFATE 2.5; .5 MG/3ML; MG/3ML
3 SOLUTION RESPIRATORY (INHALATION) ONCE
Status: COMPLETED | OUTPATIENT
Start: 2019-02-19 | End: 2019-02-19

## 2019-02-19 RX ORDER — PREDNISONE 10 MG/1
40 TABLET ORAL ONCE
Status: COMPLETED | OUTPATIENT
Start: 2019-02-19 | End: 2019-02-19

## 2019-02-19 RX ADMIN — IPRATROPIUM BROMIDE AND ALBUTEROL SULFATE 3 ML: 2.5; .5 SOLUTION RESPIRATORY (INHALATION) at 14:40

## 2019-02-19 RX ADMIN — PREDNISONE 40 MG: 10 TABLET ORAL at 14:36

## 2019-02-19 ASSESSMENT — MIFFLIN-ST. JEOR: SCORE: 1583.32

## 2019-02-19 NOTE — PATIENT INSTRUCTIONS
Symptomatic cares, humidified air, fluids, and rest, only as discussed.    Over-the-counter medications, only as discussed.    Prednisone (next dose tomorrow), as prescribed.    Use Albuterol every 4 hours as needed for cough, wheezing, shortness of breath, or chest tightness.    Recheck in clinic tomorrow, 02/20/19.    Follow up in the ER immediately if:  worsening symptoms, fever, chest pain/shortness of breath (not responsive to Albuterol), hemoptysis (coughing up blood), or other severe/emergent symptoms.

## 2019-02-19 NOTE — PROGRESS NOTES
SUBJECTIVE:   Lizzie Chow is a 58 year old female presenting with a chief complaint of   Chief Complaint   Patient presents with     URI       She is an established patient of [employer].    URI Adult    HPI: The patient is a 58-year-old female, who quit smoking 2 weeks ago.  Patient presents with a 5-day history of cough and wheezing.  Cough has become productive of colored sputum over the past few days.  Some chest tightness, reminiscent of her previous bronchitis infections.  Patient feels short of breath today.  Patient has had some improvement with Albuterol, as last used 2 hours prior to her appointment.  Patient has been using Albuterol three times/day the past 4 days, with transient improvement in her symptoms.  Last course of Prednisone was in 2015, per chart.  No hemoptysis, edema, or orthopnea.  No sore throat, but some nasal congestion and postnasal drainage.  Mild headache, but no facial pressure, fever, or vomiting.    Patient denies history of asthma or COPD.    Patient needs a note for work, as she has not been to work the past 2 days.    Patient history includes CAD and diabetes (last A1c 6.5 in 07/25/18), but patient is not checking her blood sugars.    Review of Systems   No abdominal pain or bowel/bladder changes.    Patient Active Problem List   Diagnosis     Former smoker     Flat feet     Family history of coronary artery disease     Fatty liver     Plantar fasciitis     Hypertension goal BP (blood pressure) < 140/90     NSTEMI (non-ST elevated myocardial infarction) (H)     Ischemic cardiomyopathy     Hyperlipidemia with target LDL less than 100     Menopausal state     Type 2 diabetes, HbA1c goal < 7% (H)     Advanced directives, counseling/discussion     Benign essential hypertension     Acquired hypothyroidism     Type 2 diabetes mellitus without complication, without long-term current use of insulin (H)     Obesity (BMI 35.0-39.9) with comorbidity (H)     Coronary artery disease involving  native coronary artery, angina presence unspecified, unspecified whether native or transplanted heart     Past Medical History:   Diagnosis Date     CAD (coronary artery disease) 6/15/14    MI, stent LAD      Chronic pain     both feet intermittently     Edema     furosemide     Elevated glucose      Fatty liver disease, nonalcoholic      HTN (hypertension)      Hyperlipidemia      Ischemic cardiomyopathy     EF 40-45%     Lumbago      Obesity (BMI 30-39.9)      Plantar fasciitis      Stress incontinence      Tobacco abuse      Umbilical hernia     has had surgical consult     Allergies   Allergen Reactions     No Known Drug Allergies      Family History   Problem Relation Age of Onset     Lipids Mother         alive 73     C.A.D. Mother 65        at age 65, heart attack, s/p stents placement     Hypertension Mother      Diabetes Mother      Cardiovascular Mother      Family History Negative Father         alive 74     C.A.D. Maternal Grandmother 56        fatal MI     Obesity Maternal Grandfather      Hypertension Maternal Grandfather      Obesity Paternal Grandfather      Hypertension Paternal Grandfather      Family History Negative Sister      Family History Negative Sister      Family History Negative Brother      Family History Negative Brother         leukemia at age 18     Diabetes Sister      Obesity Brother      Breast Cancer No family hx of      Cancer - colorectal No family hx of      Current Outpatient Medications   Medication Sig Dispense Refill     albuterol (PROAIR HFA/PROVENTIL HFA/VENTOLIN HFA) 108 (90 Base) MCG/ACT inhaler Inhale 2 puffs into the lungs every 4 hours as needed for shortness of breath / dyspnea 1 Inhaler 0     aspirin EC 81 MG EC tablet Take 1 tablet (81 mg) by mouth daily 30 tablet 11     atorvastatin (LIPITOR) 80 MG tablet Take 1 tablet (80 mg) by mouth daily 90 tablet 3     carvedilol (COREG) 6.25 MG tablet Take 1 tablet (6.25 mg) by mouth 2 times daily 180 tablet 3      "levothyroxine (SYNTHROID/LEVOTHROID) 25 MCG tablet Take 1 tablet (25 mcg) by mouth daily 90 tablet 3     lisinopril (PRINIVIL/ZESTRIL) 2.5 MG tablet Take 1 tablet (2.5 mg) by mouth daily 90 tablet 3     nitroGLYcerin (NITROSTAT) 0.4 MG sublingual tablet Place 1 tablet (0.4 mg) under the tongue every 5 minutes as needed for chest pain 25 tablet 1     Social History     Tobacco Use     Smoking status: Former Smoker     Last attempt to quit: 2005     Years since quittin.1     Smokeless tobacco: Never Used   Substance Use Topics     Alcohol use: Yes     Alcohol/week: 0.0 oz     Comment: one time a week       OBJECTIVE  /80 (BP Location: Right arm, Patient Position: Chair, Cuff Size: Adult Regular), then 150/80   Pulse 79, then 73   Temp 98.2  F (36.8  C) (Oral)   Resp 20   Ht 1.676 m (5' 6\")   Wt 98.7 kg (217 lb 8 oz)   LMP 2013   SpO2 90%   Breastfeeding? No   BMI 35.11 kg/m      Physical Exam    GENERAL APPEARANCE:  Awake and alert.  Mildly short of breath, but talking in complete sentences.    PSYCHIATRIC:  Pleasant affect.  HEENT:  Sclera anicteric.  No conjunctivitis.  PERRLA.  Extraocular movements are intact.  Bilateral TM's and canals are within normal limits.  Mild nasal congestion, with clear postnasal drainage.  Sinuses are not tender to palpation.  No significant erythema in the posterior pharynx.  No edema or exudates of the oral mucosa or posterior pharynx.  Mucous membranes moist.  NECK:  Spontaneous full range of motion.  No thyromegaly or mass.  No lymphadenopathy.  HEART:  Normal S1, S2.  Regular rate and rhythm.  No murmurs, rubs, or gallops.  LUNGS:  Mildly short of breath initially, but talking in complete sentences.  Decreased air entry throughout the lung fields on initial exam, with end expiratory wheezes noted at the lung bases bilaterally.  No rales or rhonchi.  ABDOMEN:  Not distended.  Soft.  Not tender.  No mass.  EXTREMITIES:  Moves 4 extremities.   No edema or " "calf tenderness.  NEUROLOGIC:  Gait within normal limits.  No facial droop or acute neurologic deficits.  SKIN:  No rash or diaphoresis.    EKG:  Discussed with and declined by patient.    Chest X-ray:  Negative for acute process or infiltrate, as reviewed by this examiner.  Formal Radiology report was pending at the time of patient discharge.    Labs:  No results found for this or any previous visit (from the past 24 hour(s)).    CLINIC COURSE: Initial oxygen saturation was 90% on room air, with thick gel nail polish noted on the patient's fingernails.  Patient declined removing her gel nail polish for a more accurate assessment, as she had \"just had her nails done\".  Discussed risk/benefits of DuoNeb treatment, with Duoneb x 1 dose administered after the patient's initial exam.  Patient felt much better after her DuoNeb treatment, with 70% improvement in her chest tightness and shortness of breath noted.  Follow-up lung exam demonstrated improved air entry throughout the lung fields, with end-expiratory wheezes noted involving the posterior lung fields.      Oxygen saturation immediately post DuoNeb treatment was 89-91% on room air, at which point the patient declined an EKG or transfer to an ER for further evaluation, treatment (to include oxygen), and monitoring.  The patient's oxygen saturation improved to 91% on room air within 2 minutes of DuoNeb treatment, at which time the patient agreed to a Chest X-ray study.  Chest X-ray was negative for acute changes or infiltrate, as discussed with patient at time of visit.  Shortness of breath completely resolved post Chest X-ray study, at which time the patient requested discharge to home with a letter for her work.      Given the patient's frequent Albuterol use over the past 4 days and response to DuoNeb treatment in clinic today, discussed the risks and benefits of Prednisone treatment in the setting of Type 2 diabetes.  Patient gave consent for treatment, so " Prednisone 40 mg x 1 dose was given prior to patient discharge.  Close follow up was discussed, as noted in the Patient Instructions section.  Patient declines further evaluation or ER transfer at this time, as noted.  Her oxygen saturation remained 91% on room air post DuoNeb treatment, as rechecked prior to discharge today.    ASSESSMENT:     1. Acute bronchospasm, likely acute bronchitis with bronchospasm.  See #2.  Doubt pneumonia, based on the negative Chest X-ray.  Patient had a good response to DuoNeb treatment, as given during her clinic evaluation today.  Differential was considered, including:  COPD exacerbation, ACS, and Pulmonary Embolism.    - ipratropium - albuterol 0.5 mg/2.5 mg/3 mL (DUONEB) neb solution 3 mL; Take 3 mLs by nebulization once   -Given in clinic today, 2/19/19.  - XR Chest 2 Views; Future  - predniSONE (DELTASONE) 20 MG tablet; Take 40 mg by mouth daily for 4 days.  Dispense: 8 tablet; Refill: 0   -Start tomorrow, 2/20/19, as noted.  Initial dose was given in clinic today, as noted below.  - predniSONE (DELTASONE) tablet 40 mg; Take 40 mg by mouth once.   -Initial dose was given in clinic today, 2/19/19.    2. Shortness of breath, with borderline hypoxia.  See #1.      3. Type 2 diabetes mellitus without complication, without long-term current use of insulin (H), with last A1C 6.5.      PLAN:    Patient declines further workup or transfer to an ER today, but she is in agreement with the following plan:    Patient Instructions     Symptomatic cares, humidified air, fluids, and rest, only as discussed.    Over-the-counter medications, only as discussed.    Prednisone (next dose recommended after appointment tomorrow), as prescribed.    Use Albuterol every 4 hours as needed for cough, wheezing, shortness of breath, or chest tightness.    Recheck in clinic tomorrow, 02/20/19.    Follow up in the ER immediately if:  worsening symptoms, fever, chest pain/shortness of breath (not responsive  to Albuterol), hemoptysis (coughing up blood), or other severe/emergent symptoms.    Recommended that the patient monitor her blood sugars while she is on Prednisone treatment.    Letter for work was given, as requested by patient today.    Discussed risks and benefits of treatment strategies, as noted in the Assessment and Plan sections.    The patient was discharged ambulatory and in stable condition post discussion of follow up.     Tarah Russo MD  [facility]

## 2019-02-19 NOTE — LETTER
February 19, 2019      Lizzie Chow  [address] [address] [place] [id number]        To Whom It May Concern:      Lizzie Chow was seen in our clinic on 2/19/2018. Please excuse Lizzie from work 2/17/18 through 2/19/18.      Sincerely,      Tarah Russo MD

## 2019-02-19 NOTE — NURSING NOTE
The following nebulizer treatment was given:     MEDICATION: Duoneb  : [employer]  LOT #: [id number]  EXPIRATION DATE:  08/2020  NDC # 1494-8581-48     Nebulizer Start Time:  1:28  Nebulizer Stop Time:  1:37  See Vital Signs Flowsheet    BLESSING Diaz      Prior to injection, verified patient identity using patient's name and date of birth.  Due to injection administration, patient instructed to remain in clinic for 15 minutes  afterwards, and to report any adverse reaction to me immediately.    prednisone    Drug Amount Wasted:  None.  Vial/Syringe:  none   Expiration Date:  05/2020    Prior to injection, verified patient identity using patient's name and date of birth.  Due to injection administration, patient instructed to remain in clinic for 15 minutes  afterwards, and to report any adverse reaction to me immediately.    Duoneb    Drug Amount Wasted:  None.  Vial/Syringe: Single dose vial  Expiration Date:  08/2020    BLESSING Diaz

## 2019-02-20 ENCOUNTER — OFFICE VISIT (OUTPATIENT)
Dept: FAMILY MEDICINE | Facility: CLINIC | Age: 59
End: 2019-02-20
Payer: COMMERCIAL

## 2019-02-20 VITALS
SYSTOLIC BLOOD PRESSURE: 146 MMHG | WEIGHT: 216 LBS | DIASTOLIC BLOOD PRESSURE: 84 MMHG | HEIGHT: 66 IN | HEART RATE: 74 BPM | TEMPERATURE: 98.4 F | OXYGEN SATURATION: 90 % | RESPIRATION RATE: 16 BRPM | BODY MASS INDEX: 34.72 KG/M2

## 2019-02-20 DIAGNOSIS — J20.9 ACUTE BRONCHITIS, UNSPECIFIED ORGANISM: Primary | ICD-10-CM

## 2019-02-20 DIAGNOSIS — Z87.891 FORMER SMOKER: ICD-10-CM

## 2019-02-20 DIAGNOSIS — J98.01 ACUTE BRONCHOSPASM: ICD-10-CM

## 2019-02-20 DIAGNOSIS — R09.02 HYPOXEMIA: ICD-10-CM

## 2019-02-20 DIAGNOSIS — R05.9 COUGH: ICD-10-CM

## 2019-02-20 PROCEDURE — 99214 OFFICE O/P EST MOD 30 MIN: CPT | Performed by: FAMILY MEDICINE

## 2019-02-20 RX ORDER — IPRATROPIUM BROMIDE AND ALBUTEROL SULFATE 2.5; .5 MG/3ML; MG/3ML
1 SOLUTION RESPIRATORY (INHALATION) EVERY 6 HOURS PRN
Qty: 3 ML | Refills: 1 | Status: SHIPPED | OUTPATIENT
Start: 2019-02-20 | End: 2019-12-03

## 2019-02-20 RX ORDER — ALBUTEROL SULFATE 90 UG/1
2 AEROSOL, METERED RESPIRATORY (INHALATION) EVERY 4 HOURS PRN
Qty: 1 INHALER | Refills: 0 | Status: SHIPPED | OUTPATIENT
Start: 2019-02-20 | End: 2019-09-19

## 2019-02-20 RX ORDER — AZITHROMYCIN 250 MG/1
TABLET, FILM COATED ORAL
Qty: 6 TABLET | Refills: 0 | Status: SHIPPED | OUTPATIENT
Start: 2019-02-20 | End: 2019-03-20

## 2019-02-20 ASSESSMENT — MIFFLIN-ST. JEOR: SCORE: 1576.52

## 2019-02-20 NOTE — PROGRESS NOTES
"  SUBJECTIVE:   Lizzie Chow is a 58 year old female who presents to clinic today for the following health issues:      F/u on cough, is a bit better, discuss oxygen levels    {additional problems for provider to add:386917}    Problem list and histories reviewed & adjusted, as indicated.  Additional history: {NONE - AS DOCUMENTED:982959::\"as documented\"}    {HIST REVIEW/ LINKS 2:114133}    Reviewed and updated as needed this visit by clinical staff       Reviewed and updated as needed this visit by Provider         {PROVIDER CHARTING PREFERENCE:774601}      SUBJECTIVE:                                                    Lizzie Chow is a 58 year old female who presents to clinic today for the following health issues:  {Provider please address medication reconciliation discrepancies--rooming staff please delete if no med/rec issues}    {Superlists:432746}    {additional problems for provider to add:330972}    Problem list and histories reviewed & adjusted, as indicated.  Additional history: {NONE - AS DOCUMENTED:944260::\"as documented\"}    {HIST REVIEW/ LINKS 2:089371}    {PROVIDER CHARTING PREFERENCE:584711}      "

## 2019-02-20 NOTE — LETTER
[facility]  [id number] [place] [id number]  Phone: [phone]    02/20/19    Lizzie Chow  [address] [address] [place] [phone]      To whom it may concern:     Lizzie was seen in clinic today. She was ill and out of work from Monday, February 18th through Wednesday, February 20th.       Sincerely,        Gladys Solis MD

## 2019-02-20 NOTE — PROGRESS NOTES
SUBJECTIVE:   Lizzie Chow is a 58 year old female who presents to clinic today for the following health issues:      Was in clinic yesterday with acute bronchospasm.  Sounds like she had been having dyspnea for a couple days prior.  She reports lots of sick contacts through work.  She quit smoking 2 weeks ago.    Yesterday, she received 40 mg prednisone in clinic and a DuoNeb.  She reports that overall she is feeling better.  She continues to feel fatigued but feels she is moving air better.  She is using her albuterol inhaler at home and took her dose of prednisone this morning.    Chest x-ray yesterday was negative.    No fevers.      Problem list and histories reviewed & adjusted, as indicated.  Additional history: none    Patient Active Problem List   Diagnosis     Former smoker     Flat feet     Family history of coronary artery disease     Fatty liver     Plantar fasciitis     Hypertension goal BP (blood pressure) < 140/90     NSTEMI (non-ST elevated myocardial infarction) (H)     Ischemic cardiomyopathy     Hyperlipidemia with target LDL less than 100     Menopausal state     Type 2 diabetes, HbA1c goal < 7% (H)     Advanced directives, counseling/discussion     Benign essential hypertension     Acquired hypothyroidism     Type 2 diabetes mellitus without complication, without long-term current use of insulin (H)     Obesity (BMI 35.0-39.9) with comorbidity (H)     Coronary artery disease involving native coronary artery, angina presence unspecified, unspecified whether native or transplanted heart     Past Surgical History:   Procedure Laterality Date     CARDIAC SURGERY      stent     COLONOSCOPY N/A 12/8/2016    Procedure: COLONOSCOPY;  Surgeon: Ranjith Serrano MD;  Location:  GI     HERNIORRHAPHY VENTRAL  10/8/2013    Procedure: HERNIORRHAPHY VENTRAL;  Ventral hernia repair with mesh;  Surgeon: Alice Cordova MD;  Location: [facility] OR     NO HISTORY OF SURGERY         Social History     Tobacco  "Use     Smoking status: Former Smoker     Last attempt to quit: 2005     Years since quittin.1     Smokeless tobacco: Never Used   Substance Use Topics     Alcohol use: Yes     Alcohol/week: 0.0 oz     Comment: one time a week     Family History   Problem Relation Age of Onset     Lipids Mother         alive 73     C.A.D. Mother 65        at age 65, heart attack, s/p stents placement     Hypertension Mother      Diabetes Mother      Cardiovascular Mother      Family History Negative Father         alive 74     C.A.D. Maternal Grandmother 56        fatal MI     Obesity Maternal Grandfather      Hypertension Maternal Grandfather      Obesity Paternal Grandfather      Hypertension Paternal Grandfather      Family History Negative Sister      Family History Negative Sister      Family History Negative Brother      Family History Negative Brother         leukemia at age 18     Diabetes Sister      Obesity Brother      Breast Cancer No family hx of      Cancer - colorectal No family hx of            Reviewed and updated as needed this visit by clinical staff  Tobacco  Allergies  Meds       Reviewed and updated as needed this visit by Provider         ROS:  Constitutional, HEENT, cardiovascular, pulmonary, gi and gu systems are negative, except as otherwise noted.    OBJECTIVE:     /84 (BP Location: Right arm, Patient Position: Chair, Cuff Size: Adult Regular)   Pulse 74   Temp 98.4  F (36.9  C) (Oral)   Resp 16   Ht 1.676 m (5' 6\")   Wt 98 kg (216 lb)   LMP 2013   SpO2 90%   BMI 34.86 kg/m    Body mass index is 34.86 kg/m .  GENERAL: healthy, alert and no distress  HENT: ear canals and TM's normal, nose and mouth without ulcers or lesions  NECK: no adenopathy, no asymmetry, masses, or scars and thyroid normal to palpation  RESP: lungs clear to auscultation - no rales, rhonchi or wheezes  CV: regular rate and rhythm, normal S1 S2, no S3 or S4, no murmur, click or rub, no peripheral edema and " peripheral pulses strong    Diagnostic Test Results:  none     ASSESSMENT/PLAN:     1. Acute bronchitis, unspecified organism -given her history of bronchitis and recent history of smoking cessation, will elect to treat with antibiotics for a bacterial bronchitis today.  - azithromycin (ZITHROMAX) 250 MG tablet; Two tablets first day, then one tablet daily for four days.  Dispense: 6 tablet; Refill: 0    2. Acute bronchospasm -suggested continued use of prednisone, add duo nebs scheduled for the next 2 days, refill albuterol.  She is leaving for [place] tomorrow evening.  Discussed this is reasonable, but she does need to take her a.m. prednisone and bring her albuterol inhaler with her on the airplane.  She should pack her nebulizer with refills in her suitcase.  - albuterol (PROAIR HFA/PROVENTIL HFA/VENTOLIN HFA) 108 (90 Base) MCG/ACT inhaler; Inhale 2 puffs into the lungs every 4 hours as needed for shortness of breath / dyspnea  Dispense: 1 Inhaler; Refill: 0  - order for DME; Equipment being ordered: Nebulizer with mask and tubing  Dispense: 1 Units; Refill: 0  - ipratropium - albuterol 0.5 mg/2.5 mg/3 mL (DUONEB) 0.5-2.5 (3) MG/3ML neb solution; Take 1 vial (3 mLs) by nebulization every 6 hours as needed for shortness of breath / dyspnea or wheezing  Dispense: 3 mL; Refill: 1    3. Hypoxemia -unchanged from yesterday.  This is reflected by her lung exam today.  She seemed to be breathing easy during her interview.    4. Cough    5. Former smoker      Gladys Solis MD  [facility]

## 2019-02-22 ENCOUNTER — TELEPHONE (OUTPATIENT)
Dept: FAMILY MEDICINE | Facility: CLINIC | Age: 59
End: 2019-02-22

## 2019-02-22 NOTE — TELEPHONE ENCOUNTER
[facility]    Dr Solis would like to know how patient is doing since her visit on Wednesday    Timmy Robles RN, BSN

## 2019-03-20 ENCOUNTER — OFFICE VISIT (OUTPATIENT)
Dept: FAMILY MEDICINE | Facility: CLINIC | Age: 59
End: 2019-03-20
Payer: COMMERCIAL

## 2019-03-20 VITALS
HEIGHT: 66 IN | RESPIRATION RATE: 16 BRPM | BODY MASS INDEX: 34.87 KG/M2 | TEMPERATURE: 98 F | WEIGHT: 217 LBS | HEART RATE: 71 BPM | DIASTOLIC BLOOD PRESSURE: 86 MMHG | SYSTOLIC BLOOD PRESSURE: 138 MMHG

## 2019-03-20 DIAGNOSIS — E11.9 TYPE 2 DIABETES MELLITUS WITHOUT COMPLICATION, WITHOUT LONG-TERM CURRENT USE OF INSULIN (H): Primary | ICD-10-CM

## 2019-03-20 DIAGNOSIS — E78.2 MIXED HYPERLIPIDEMIA: ICD-10-CM

## 2019-03-20 DIAGNOSIS — E66.01 MORBID OBESITY (H): ICD-10-CM

## 2019-03-20 DIAGNOSIS — I25.118 CORONARY ARTERY DISEASE OF NATIVE HEART WITH STABLE ANGINA PECTORIS, UNSPECIFIED VESSEL OR LESION TYPE (H): ICD-10-CM

## 2019-03-20 DIAGNOSIS — I10 BENIGN ESSENTIAL HYPERTENSION: ICD-10-CM

## 2019-03-20 DIAGNOSIS — Z87.891 FORMER SMOKER: ICD-10-CM

## 2019-03-20 LAB
CREAT UR-MCNC: 140 MG/DL
HBA1C MFR BLD: 6.7 % (ref 0–5.6)
MICROALBUMIN UR-MCNC: 13 MG/L
MICROALBUMIN/CREAT UR: 9.43 MG/G CR (ref 0–25)

## 2019-03-20 PROCEDURE — 82043 UR ALBUMIN QUANTITATIVE: CPT | Performed by: FAMILY MEDICINE

## 2019-03-20 PROCEDURE — 36415 COLL VENOUS BLD VENIPUNCTURE: CPT | Performed by: FAMILY MEDICINE

## 2019-03-20 PROCEDURE — 99214 OFFICE O/P EST MOD 30 MIN: CPT | Performed by: FAMILY MEDICINE

## 2019-03-20 PROCEDURE — 83036 HEMOGLOBIN GLYCOSYLATED A1C: CPT | Performed by: FAMILY MEDICINE

## 2019-03-20 RX ORDER — ATORVASTATIN CALCIUM 80 MG/1
80 TABLET, FILM COATED ORAL DAILY
Qty: 90 TABLET | Refills: 3 | Status: SHIPPED | OUTPATIENT
Start: 2019-03-20 | End: 2020-04-10

## 2019-03-20 ASSESSMENT — MIFFLIN-ST. JEOR: SCORE: 1581.06

## 2019-03-20 NOTE — PROGRESS NOTES
SUBJECTIVE:   Lizzie Chow is a 58 year old female who presents to clinic today for the following health issues:    History of Present Illness     Diabetes:     Frequency of checking blood sugars::  Not at all    Diabetic concerns::  None    Hypoglycemia symptoms::  None    Paraesthesia present::  YES    Eye Exam in the last year::  NO    Diabetes Management Resources      Her last diabetes visit was July 2018.  She notes that she was unclear on when she needed to return to clinic for recheck.  She is currently diet controlled.  We have been monitoring her A1c as she has been borderline diabetic over the past several checks.  Her last couple of checks have shown an A1c of 6.5%, thus diagnosing type 2 diabetes.    She is taking lisinopril and carvedilol for management of her hypertension, CAD and history of ACS.  She reports no chest pain, dyspnea, palpitations.    She is on a statin and aspirin for secondary prevention.      Health maintenance- mammogram scheduled for next month      Problem list and histories reviewed & adjusted, as indicated.  Additional history: none      Patient Active Problem List   Diagnosis     Former smoker     Flat feet     Family history of coronary artery disease     Fatty liver     Plantar fasciitis     Hypertension goal BP (blood pressure) < 140/90     NSTEMI (non-ST elevated myocardial infarction) (H)     Ischemic cardiomyopathy     Hyperlipidemia with target LDL less than 100     Menopausal state     Type 2 diabetes, HbA1c goal < 7% (H)     Advanced directives, counseling/discussion     Benign essential hypertension     Acquired hypothyroidism     Type 2 diabetes mellitus without complication, without long-term current use of insulin (H)     Obesity (BMI 35.0-39.9) with comorbidity (H)     Coronary artery disease involving native coronary artery, angina presence unspecified, unspecified whether native or transplanted heart     Coronary artery disease of native heart with stable  "angina pectoris, unspecified vessel or lesion type (H)     Past Surgical History:   Procedure Laterality Date     CARDIAC SURGERY      stent     COLONOSCOPY N/A 2016    Procedure: COLONOSCOPY;  Surgeon: Ranjith Serrano MD;  Location: [facility] GI     HERNIORRHAPHY VENTRAL  10/8/2013    Procedure: HERNIORRHAPHY VENTRAL;  Ventral hernia repair with mesh;  Surgeon: Alice Cordova MD;  Location: [facility] OR     NO HISTORY OF SURGERY         Social History     Tobacco Use     Smoking status: Former Smoker     Last attempt to quit: 2005     Years since quittin.2     Smokeless tobacco: Never Used   Substance Use Topics     Alcohol use: Yes     Alcohol/week: 0.0 oz     Comment: one time a week     Family History   Problem Relation Age of Onset     Lipids Mother         alive 73     C.A.D. Mother 65        at age 65, heart attack, s/p stents placement     Hypertension Mother      Diabetes Mother      Cardiovascular Mother      Family History Negative Father         alive 74     C.A.D. Maternal Grandmother 56        fatal MI     Obesity Maternal Grandfather      Hypertension Maternal Grandfather      Obesity Paternal Grandfather      Hypertension Paternal Grandfather      Family History Negative Sister      Family History Negative Sister      Family History Negative Brother      Family History Negative Brother         leukemia at age 18     Diabetes Sister      Obesity Brother      Breast Cancer No family hx of      Cancer - colorectal No family hx of            ROS:  Constitutional, HEENT, cardiovascular, pulmonary, gi and gu systems are negative, except as otherwise noted.    OBJECTIVE:     /86   Pulse 71   Temp 98  F (36.7  C) (Oral)   Resp 16   Ht 1.676 m (5' 6\")   Wt 98.4 kg (217 lb)   LMP 2013   Breastfeeding? No   BMI 35.02 kg/m    Body mass index is 35.02 kg/m .  GENERAL: healthy, alert and no distress  RESP: lungs clear to auscultation - no rales, rhonchi or wheezes  CV: regular rate " and rhythm, normal S1 S2, no S3 or S4, no murmur, click or rub, no peripheral edema and peripheral pulses strong  PSYCH: mentation appears normal, affect normal/bright    Diagnostic Test Results:  Results for orders placed or performed in visit on 03/20/19 (from the past 24 hour(s))   Hemoglobin A1c   Result Value Ref Range    Hemoglobin A1C 6.7 (H) 0 - 5.6 %     Lab Results   Component Value Date    A1C 6.7 03/20/2019    A1C 6.5 07/25/2018    A1C 6.5 03/23/2018    A1C 6.3 09/19/2017    A1C 6.1 11/03/2016       ASSESSMENT/PLAN:     1. Type 2 diabetes mellitus without complication, without long-term current use of insulin (H) - slightly worsened control, advised tighter carb control, follow up in 3 months.   - Albumin Random Urine Quantitative with Creat Ratio  - Hemoglobin A1c    2. Mixed hyperlipidemia - on statin, continue  - atorvastatin (LIPITOR) 80 MG tablet; Take 1 tablet (80 mg) by mouth daily  Dispense: 90 tablet; Refill: 3    3. Morbid obesity (H) -advised weight loss to help with diabetes management, cholesterol levels, and hypertension.    4. Coronary artery disease of native heart with stable angina pectoris, unspecified vessel or lesion type (H) - on statin and ASA, continue    5. Benign essential hypertension - in range on recheck, will continue to monitor, no changes    6. Former smoker      Gladys Solis MD  [facility]

## 2019-04-05 ENCOUNTER — TELEPHONE (OUTPATIENT)
Dept: FAMILY MEDICINE | Facility: CLINIC | Age: 59
End: 2019-04-05

## 2019-04-05 NOTE — TELEPHONE ENCOUNTER
Panel Management Review      Patient has the following on her problem list:     Diabetes    ASA: Passed    Last A1C  Lab Results   Component Value Date    A1C 6.7 03/20/2019    A1C 6.5 07/25/2018    A1C 6.5 03/23/2018    A1C 6.3 09/19/2017    A1C 6.1 11/03/2016     A1C tested: Passed    Last LDL:    Lab Results   Component Value Date    CHOL 129 07/25/2018     Lab Results   Component Value Date    HDL 46 07/25/2018     Lab Results   Component Value Date    LDL 45 07/25/2018     Lab Results   Component Value Date    TRIG 188 07/25/2018     Lab Results   Component Value Date    CHOLHDLRATIO 2.8 09/01/2015     Lab Results   Component Value Date    NHDL 83 07/25/2018       Is the patient on a Statin? YES             Is the patient on Aspirin? YES    Medications     HMG CoA Reductase Inhibitors     atorvastatin (LIPITOR) 80 MG tablet       Salicylates     aspirin EC 81 MG EC tablet             Last three blood pressure readings:  BP Readings from Last 3 Encounters:   03/20/19 138/86   02/20/19 146/84   02/19/19 150/80       Date of last diabetes office visit: 3-20-19     Tobacco History:     History   Smoking Status     Former Smoker     Quit date: 1/1/2005   Smokeless Tobacco     Never Used           Composite cancer screening  Chart review shows that this patient is due/due soon for the following Mammogram  Summary:    Patient is due/failing the following:   MAMMOGRAM    Action needed:   None at this time as pt recently contacted by imaging scheduling for mammogram and has DM f/u appointment scheduled.    Type of outreach:    none    Questions for provider review:    None                                                                                                                                    Esteban Soria [facility]           Chart routed to none .

## 2019-06-19 NOTE — NURSING NOTE
"Chief Complaint   Patient presents with     Abdominal Pain       Initial /84 (BP Location: Right arm, Patient Position: Sitting, Cuff Size: Adult Large)  Pulse 66  Temp 98.2  F (36.8  C) (Oral)  Wt 223 lb 8 oz (101.4 kg)  SpO2 96%  Breastfeeding? No  BMI 35.01 kg/m2 Estimated body mass index is 35.01 kg/(m^2) as calculated from the following:    Height as of 1/5/17: 5' 7\" (1.702 m).    Weight as of this encounter: 223 lb 8 oz (101.4 kg).  Medication Reconciliation: complete     Nimesh KEY      " no chest pain and no edema.

## 2019-06-27 ENCOUNTER — TELEPHONE (OUTPATIENT)
Dept: FAMILY MEDICINE | Facility: CLINIC | Age: 59
End: 2019-06-27

## 2019-06-27 NOTE — TELEPHONE ENCOUNTER
Panel Management Review      Patient has the following on her problem list:   Diabetes    ASA: Passed    Last A1C  Lab Results   Component Value Date    A1C 6.7 03/20/2019    A1C 6.5 07/25/2018    A1C 6.5 03/23/2018    A1C 6.3 09/19/2017    A1C 6.1 11/03/2016     A1C tested: Passed    Last LDL:    Lab Results   Component Value Date    CHOL 129 07/25/2018     Lab Results   Component Value Date    HDL 46 07/25/2018     Lab Results   Component Value Date    LDL 45 07/25/2018     Lab Results   Component Value Date    TRIG 188 07/25/2018     Lab Results   Component Value Date    CHOLHDLRATIO 2.8 09/01/2015     Lab Results   Component Value Date    NHDL 83 07/25/2018       Is the patient on a Statin? YES             Is the patient on Aspirin? YES    Medications     HMG CoA Reductase Inhibitors     atorvastatin (LIPITOR) 80 MG tablet       Salicylates     aspirin EC 81 MG EC tablet             Last three blood pressure readings:  BP Readings from Last 3 Encounters:   03/20/19 138/86   02/20/19 146/84   02/19/19 150/80       Date of last diabetes office visit: 3-20-19     Tobacco History:     History   Smoking Status     Former Smoker     Quit date: 1/1/2005   Smokeless Tobacco     Never Used           Composite cancer screening  Chart review shows that this patient is due/due soon for the following Mammogram  Summary:    Patient is due/failing the following:   A1C and MAMMOGRAM    Action needed:   None at this time as pt has up coming appointment scheduled. Will up date  at that time.                                                                                    Esteban Soria [facility]           Chart routed to none .

## 2019-08-07 ENCOUNTER — TRANSFERRED RECORDS (OUTPATIENT)
Dept: HEALTH INFORMATION MANAGEMENT | Facility: CLINIC | Age: 59
End: 2019-08-07

## 2019-08-07 ENCOUNTER — TRANSFERRED RECORDS (OUTPATIENT)
Dept: HEALTH INFORMATION MANAGEMENT | Facility: CLINIC | Age: 59
End: 2019-08-07
Payer: COMMERCIAL

## 2019-08-07 LAB — RETINOPATHY: NEGATIVE

## 2019-09-18 DIAGNOSIS — J98.01 ACUTE BRONCHOSPASM: ICD-10-CM

## 2019-09-19 RX ORDER — ALBUTEROL SULFATE 90 UG/1
2 AEROSOL, METERED RESPIRATORY (INHALATION) EVERY 4 HOURS PRN
Qty: 1 INHALER | Refills: 0 | Status: SHIPPED | OUTPATIENT
Start: 2019-09-19 | End: 2020-03-20

## 2019-09-19 NOTE — TELEPHONE ENCOUNTER
Medication is being filled for 1 time refill only due to:  Patient needs to be seen because psat due for Dm appt. Pt was due in June and no showed in July.  Mychart sent.     Timmy Robles RN, BSN

## 2019-09-19 NOTE — TELEPHONE ENCOUNTER
"Requested Prescriptions   Pending Prescriptions Disp Refills     albuterol (PROAIR HFA/PROVENTIL HFA/VENTOLIN HFA) 108 (90 Base) MCG/ACT inhaler  Last Written Prescription Date:  2/20/2019  Last Fill Quantity: 1 inhaler,  # refills: 0   Last office visit: 3/20/2019 with prescribing provider:  Will   Future Office Visit:     1 Inhaler 0     Sig: Inhale 2 puffs into the lungs every 4 hours as needed for shortness of breath / dyspnea       Asthma Maintenance Inhalers - Anticholinergics Passed - 9/18/2019  8:53 PM        Passed - Patient is age 12 years or older        Passed - Recent (12 mo) or future (30 days) visit within the authorizing provider's specialty     Patient had office visit in the last 12 months or has a visit in the next 30 days with authorizing provider or within the authorizing provider's specialty.  See \"Patient Info\" tab in inbasket, or \"Choose Columns\" in Meds & Orders section of the refill encounter.              Passed - Medication is active on med list          "

## 2019-10-01 ENCOUNTER — HEALTH MAINTENANCE LETTER (OUTPATIENT)
Age: 59
End: 2019-10-01

## 2019-11-10 DIAGNOSIS — I10 BENIGN ESSENTIAL HYPERTENSION: ICD-10-CM

## 2019-11-10 NOTE — TELEPHONE ENCOUNTER
"Requested Prescriptions   Pending Prescriptions Disp Refills     lisinopril (PRINIVIL/ZESTRIL) 2.5 MG tablet  Last Written Prescription Date:  10/15/2018  Last Fill Quantity: 90 tablet,  # refills: 3   Last office visit: 3/20/2019 with prescribing provider:  Will   Future Office Visit:     90 tablet 3     Sig: Take 1 tablet (2.5 mg) by mouth daily       ACE Inhibitors (Including Combos) Protocol Failed - 11/10/2019  8:35 AM        Failed - Normal serum creatinine on file in past 12 months     Recent Labs   Lab Test 08/31/18  1456   CR 0.58             Failed - Normal serum potassium on file in past 12 months     Recent Labs   Lab Test 08/31/18  1456   POTASSIUM 4.0             Passed - Blood pressure under 140/90 in past 12 months     BP Readings from Last 3 Encounters:   03/20/19 138/86   02/20/19 146/84   02/19/19 150/80                 Passed - Recent (12 mo) or future (30 days) visit within the authorizing provider's specialty     Patient has had an office visit with the authorizing provider or a provider within the authorizing providers department within the previous 12 mos or has a future within next 30 days. See \"Patient Info\" tab in inbasket, or \"Choose Columns\" in Meds & Orders section of the refill encounter.              Passed - Medication is active on med list        Passed - Patient is age 18 or older        Passed - No active pregnancy on record        Passed - No positive pregnancy test within past 12 months          "

## 2019-11-13 RX ORDER — LISINOPRIL 2.5 MG/1
2.5 TABLET ORAL DAILY
Qty: 30 TABLET | Refills: 0 | Status: SHIPPED | OUTPATIENT
Start: 2019-11-13 | End: 2019-12-03

## 2019-11-13 NOTE — TELEPHONE ENCOUNTER
Medication is being filled for 1 time refill only due to:  Patient needs to be seen because past due for DM and labs. Timmy Robles RN, BSN

## 2019-12-03 ENCOUNTER — OFFICE VISIT (OUTPATIENT)
Dept: FAMILY MEDICINE | Facility: CLINIC | Age: 59
End: 2019-12-03
Payer: COMMERCIAL

## 2019-12-03 VITALS
TEMPERATURE: 98.1 F | WEIGHT: 213 LBS | HEART RATE: 65 BPM | BODY MASS INDEX: 34.38 KG/M2 | SYSTOLIC BLOOD PRESSURE: 142 MMHG | OXYGEN SATURATION: 96 % | DIASTOLIC BLOOD PRESSURE: 86 MMHG

## 2019-12-03 DIAGNOSIS — E03.9 ACQUIRED HYPOTHYROIDISM: ICD-10-CM

## 2019-12-03 DIAGNOSIS — Z23 NEED FOR PROPHYLACTIC VACCINATION AND INOCULATION AGAINST INFLUENZA: ICD-10-CM

## 2019-12-03 DIAGNOSIS — Z11.59 ENCOUNTER FOR HEPATITIS C SCREENING TEST FOR LOW RISK PATIENT: ICD-10-CM

## 2019-12-03 DIAGNOSIS — I10 BENIGN ESSENTIAL HYPERTENSION: ICD-10-CM

## 2019-12-03 DIAGNOSIS — E11.9 TYPE 2 DIABETES MELLITUS WITHOUT COMPLICATION, WITHOUT LONG-TERM CURRENT USE OF INSULIN (H): Primary | ICD-10-CM

## 2019-12-03 DIAGNOSIS — I25.118 CORONARY ARTERY DISEASE OF NATIVE HEART WITH STABLE ANGINA PECTORIS, UNSPECIFIED VESSEL OR LESION TYPE (H): ICD-10-CM

## 2019-12-03 DIAGNOSIS — Z12.31 ENCOUNTER FOR SCREENING MAMMOGRAM FOR BREAST CANCER: ICD-10-CM

## 2019-12-03 LAB — HBA1C MFR BLD: 6.2 % (ref 0–5.6)

## 2019-12-03 PROCEDURE — 86803 HEPATITIS C AB TEST: CPT | Performed by: FAMILY MEDICINE

## 2019-12-03 PROCEDURE — 90682 RIV4 VACC RECOMBINANT DNA IM: CPT | Performed by: FAMILY MEDICINE

## 2019-12-03 PROCEDURE — 80048 BASIC METABOLIC PNL TOTAL CA: CPT | Performed by: FAMILY MEDICINE

## 2019-12-03 PROCEDURE — 90471 IMMUNIZATION ADMIN: CPT | Performed by: FAMILY MEDICINE

## 2019-12-03 PROCEDURE — 80061 LIPID PANEL: CPT | Performed by: FAMILY MEDICINE

## 2019-12-03 PROCEDURE — 36415 COLL VENOUS BLD VENIPUNCTURE: CPT | Performed by: FAMILY MEDICINE

## 2019-12-03 PROCEDURE — 83036 HEMOGLOBIN GLYCOSYLATED A1C: CPT | Performed by: FAMILY MEDICINE

## 2019-12-03 PROCEDURE — 84439 ASSAY OF FREE THYROXINE: CPT | Performed by: FAMILY MEDICINE

## 2019-12-03 PROCEDURE — 84443 ASSAY THYROID STIM HORMONE: CPT | Performed by: FAMILY MEDICINE

## 2019-12-03 PROCEDURE — 99214 OFFICE O/P EST MOD 30 MIN: CPT | Mod: 25 | Performed by: FAMILY MEDICINE

## 2019-12-03 RX ORDER — LEVOTHYROXINE SODIUM 25 UG/1
25 TABLET ORAL DAILY
Qty: 90 TABLET | Refills: 3 | Status: SHIPPED | OUTPATIENT
Start: 2019-12-03 | End: 2019-12-06

## 2019-12-03 RX ORDER — CARVEDILOL 6.25 MG/1
6.25 TABLET ORAL 2 TIMES DAILY
Qty: 180 TABLET | Refills: 3 | Status: SHIPPED | OUTPATIENT
Start: 2019-12-03 | End: 2021-01-04

## 2019-12-03 RX ORDER — LISINOPRIL 2.5 MG/1
2.5 TABLET ORAL DAILY
Qty: 90 TABLET | Refills: 3 | Status: SHIPPED | OUTPATIENT
Start: 2019-12-03 | End: 2020-11-30

## 2019-12-03 NOTE — PROGRESS NOTES
Subjective     Lizzie Chow is a 58 year old female who presents to clinic today for the following health issues:    History of Present Illness        Diabetes:   She presents for follow up of diabetes.  She is not checking blood glucose. She has no concerns regarding her diabetes at this time.  She is having numbness in feet. The patient has had a diabetic eye exam in the last 12 months. Eye exam performed on [id number].    Diabetes Management Resources       Does not take any diabetes medications, managing through diet and exercise.  Her  is also a type II diabetic, so they manage their carbohydrates together.    She does not check blood sugars.    Is on statin, aspirin, ACE inhibitor.    No chest pain or shortness of breath      Patient Active Problem List   Diagnosis     Former smoker     Flat feet     Family history of coronary artery disease     Fatty liver     Plantar fasciitis     Hypertension goal BP (blood pressure) < 140/90     NSTEMI (non-ST elevated myocardial infarction) (H)     Ischemic cardiomyopathy     Hyperlipidemia with target LDL less than 100     Menopausal state     Type 2 diabetes, HbA1c goal < 7% (H)     Advanced directives, counseling/discussion     Benign essential hypertension     Acquired hypothyroidism     Type 2 diabetes mellitus without complication, without long-term current use of insulin (H)     Obesity (BMI 35.0-39.9) with comorbidity (H)     Coronary artery disease involving native coronary artery, angina presence unspecified, unspecified whether native or transplanted heart     Coronary artery disease of native heart with stable angina pectoris, unspecified vessel or lesion type (H)     Past Surgical History:   Procedure Laterality Date     CARDIAC SURGERY      stent     COLONOSCOPY N/A 12/8/2016    Procedure: COLONOSCOPY;  Surgeon: Ranjith Serrano MD;  Location:  GI     HERNIORRHAPHY VENTRAL  10/8/2013    Procedure: HERNIORRHAPHY VENTRAL;  Ventral hernia repair with  mesh;  Surgeon: Alice Cordova MD;  Location: [facility] OR     NO HISTORY OF SURGERY         Social History     Tobacco Use     Smoking status: Current Some Day Smoker     Packs/day: 0.25     Last attempt to quit: 2005     Years since quittin.9     Smokeless tobacco: Never Used   Substance Use Topics     Alcohol use: Yes     Alcohol/week: 0.0 standard drinks     Comment: one time a week     Family History   Problem Relation Age of Onset     Lipids Mother         alive 73     C.A.D. Mother 65        at age 65, heart attack, s/p stents placement     Hypertension Mother      Diabetes Mother      Cardiovascular Mother      Family History Negative Father         alive 74     C.A.D. Maternal Grandmother 56        fatal MI     Obesity Maternal Grandfather      Hypertension Maternal Grandfather      Obesity Paternal Grandfather      Hypertension Paternal Grandfather      Family History Negative Sister      Family History Negative Sister      Family History Negative Brother      Family History Negative Brother         leukemia at age 18     Diabetes Sister      Obesity Brother      Breast Cancer No family hx of      Cancer - colorectal No family hx of            Reviewed and updated as needed this visit by Provider         Review of Systems   ROS COMP: Constitutional, HEENT, cardiovascular, pulmonary, gi and gu systems are negative, except as otherwise noted.      Objective    BP (!) 142/86 (BP Location: Right arm, Patient Position: Sitting, Cuff Size: Adult Large)   Pulse 65   Temp 98.1  F (36.7  C) (Oral)   Wt 96.6 kg (213 lb)   LMP 2013   SpO2 96%   BMI 34.38 kg/m    Body mass index is 34.38 kg/m .  Physical Exam   GENERAL: healthy, alert and no distress  RESP: lungs clear to auscultation - no rales, rhonchi or wheezes  CV: regular rate and rhythm, normal S1 S2, no S3 or S4, no murmur, click or rub, no peripheral edema and peripheral pulses strong  PSYCH: mentation appears normal, affect  normal/bright    Diagnostic Test Results:      Lab Results   Component Value Date    A1C 6.2 12/03/2019    A1C 6.7 03/20/2019    A1C 6.5 07/25/2018    A1C 6.5 03/23/2018    A1C 6.3 09/19/2017         Assessment & Plan     1. Type 2 diabetes mellitus without complication, without long-term current use of insulin (H) -continues to have excellent control, follow-up in 3 months  - Hemoglobin A1c  - Lipid panel reflex to direct LDL Fasting    2. Benign essential hypertension -uncontrolled today, advised 2-week blood pressure recheck in clinic.  Surveillance labs today.  - BASIC METABOLIC PANEL  - lisinopril (PRINIVIL/ZESTRIL) 2.5 MG tablet; Take 1 tablet (2.5 mg) by mouth daily  Dispense: 90 tablet; Refill: 3    3. Acquired hypothyroidism -check thyroid levels today, refills pending lab results.  - TSH with free T4 reflex  - levothyroxine (SYNTHROID/LEVOTHROID) 25 MCG tablet; Take 1 tablet (25 mcg) by mouth daily  Dispense: 90 tablet; Refill: 3    4. Coronary artery disease of native heart with stable angina pectoris, unspecified vessel or lesion type (H) -on statin, continue.  Continue beta-blocker no change.  - Lipid panel reflex to direct LDL Fasting  - carvedilol (COREG) 6.25 MG tablet; Take 1 tablet (6.25 mg) by mouth 2 times daily  Dispense: 180 tablet; Refill: 3    5. Encounter for hepatitis C screening test for low risk patient  - Hepatitis C Screen Reflex to HCV RNA Quant and Genotype    6. Encounter for screening mammogram for breast cancer  - MA Screen Bilateral w/Tj; Future    7. Need for prophylactic vaccination and inoculation against influenza  - INFLUENZA QUAD, RECOMBINANT, P-FREE (RIV4) (FLUBLOCK) [98418]  - Vaccine Administration, Initial [49806]       No follow-ups on file.    Gladys Solis MD  [facility]

## 2019-12-04 LAB
ANION GAP SERPL CALCULATED.3IONS-SCNC: 5 MMOL/L (ref 3–14)
BUN SERPL-MCNC: 20 MG/DL (ref 7–30)
CALCIUM SERPL-MCNC: 9 MG/DL (ref 8.5–10.1)
CHLORIDE SERPL-SCNC: 102 MMOL/L (ref 94–109)
CHOLEST SERPL-MCNC: 153 MG/DL
CO2 SERPL-SCNC: 31 MMOL/L (ref 20–32)
CREAT SERPL-MCNC: 0.58 MG/DL (ref 0.52–1.04)
GFR SERPL CREATININE-BSD FRML MDRD: >90 ML/MIN/{1.73_M2}
GLUCOSE SERPL-MCNC: 95 MG/DL (ref 70–99)
HCV AB SERPL QL IA: NONREACTIVE
HDLC SERPL-MCNC: 51 MG/DL
LDLC SERPL CALC-MCNC: 44 MG/DL
NONHDLC SERPL-MCNC: 102 MG/DL
POTASSIUM SERPL-SCNC: 3.8 MMOL/L (ref 3.4–5.3)
SODIUM SERPL-SCNC: 138 MMOL/L (ref 133–144)
T4 FREE SERPL-MCNC: 0.97 NG/DL (ref 0.76–1.46)
TRIGL SERPL-MCNC: 292 MG/DL
TSH SERPL DL<=0.005 MIU/L-ACNC: 6 MU/L (ref 0.4–4)

## 2019-12-06 DIAGNOSIS — E03.9 ACQUIRED HYPOTHYROIDISM: Primary | ICD-10-CM

## 2019-12-06 RX ORDER — LEVOTHYROXINE SODIUM 50 UG/1
50 TABLET ORAL DAILY
Qty: 90 TABLET | Refills: 0 | Status: SHIPPED | OUTPATIENT
Start: 2019-12-06 | End: 2020-03-17

## 2019-12-15 DIAGNOSIS — I25.118 CORONARY ARTERY DISEASE OF NATIVE HEART WITH STABLE ANGINA PECTORIS, UNSPECIFIED VESSEL OR LESION TYPE (H): ICD-10-CM

## 2019-12-16 RX ORDER — CARVEDILOL 6.25 MG/1
6.25 TABLET ORAL 2 TIMES DAILY
Qty: 180 TABLET | Refills: 3 | OUTPATIENT
Start: 2019-12-16

## 2020-02-10 DIAGNOSIS — E03.9 ACQUIRED HYPOTHYROIDISM: ICD-10-CM

## 2020-02-10 RX ORDER — LEVOTHYROXINE SODIUM 50 UG/1
50 TABLET ORAL DAILY
Qty: 90 TABLET | Refills: 0 | OUTPATIENT
Start: 2020-02-10

## 2020-03-15 DIAGNOSIS — E03.9 ACQUIRED HYPOTHYROIDISM: ICD-10-CM

## 2020-03-16 NOTE — TELEPHONE ENCOUNTER
Routing refill request to provider for review/approval because:  Dia given x1 and patient did not follow up, please advise  Labs out of range:  TSH    Last 3 visit have been cancelled  Timmy Robles RN, BSN

## 2020-03-17 RX ORDER — LEVOTHYROXINE SODIUM 50 UG/1
50 TABLET ORAL DAILY
Qty: 30 TABLET | Refills: 0 | Status: SHIPPED | OUTPATIENT
Start: 2020-03-17 | End: 2020-03-24

## 2020-03-19 DIAGNOSIS — J98.01 ACUTE BRONCHOSPASM: ICD-10-CM

## 2020-03-20 RX ORDER — ALBUTEROL SULFATE 90 UG/1
2 AEROSOL, METERED RESPIRATORY (INHALATION) EVERY 4 HOURS PRN
Qty: 1 INHALER | Refills: 0 | Status: SHIPPED | OUTPATIENT
Start: 2020-03-20

## 2020-03-20 NOTE — TELEPHONE ENCOUNTER
Prescription approved per [facility] [facility].  Kelsey Madrid RN

## 2020-03-21 DIAGNOSIS — E03.9 ACQUIRED HYPOTHYROIDISM: ICD-10-CM

## 2020-03-21 LAB — TSH SERPL DL<=0.005 MIU/L-ACNC: 2.47 MU/L (ref 0.4–4)

## 2020-03-21 PROCEDURE — 84443 ASSAY THYROID STIM HORMONE: CPT | Performed by: FAMILY MEDICINE

## 2020-03-21 PROCEDURE — 36415 COLL VENOUS BLD VENIPUNCTURE: CPT | Performed by: FAMILY MEDICINE

## 2020-03-22 ENCOUNTER — HEALTH MAINTENANCE LETTER (OUTPATIENT)
Age: 60
End: 2020-03-22

## 2020-03-24 DIAGNOSIS — E03.9 ACQUIRED HYPOTHYROIDISM: ICD-10-CM

## 2020-03-24 RX ORDER — LEVOTHYROXINE SODIUM 50 UG/1
50 TABLET ORAL DAILY
Qty: 90 TABLET | Refills: 3 | Status: SHIPPED | OUTPATIENT
Start: 2020-03-24 | End: 2020-09-10

## 2020-04-06 ENCOUNTER — E-VISIT (OUTPATIENT)
Dept: FAMILY MEDICINE | Facility: CLINIC | Age: 60
End: 2020-04-06
Payer: COMMERCIAL

## 2020-04-06 DIAGNOSIS — J01.90 ACUTE SINUSITIS, RECURRENCE NOT SPECIFIED, UNSPECIFIED LOCATION: Primary | ICD-10-CM

## 2020-04-06 PROCEDURE — 99421 OL DIG E/M SVC 5-10 MIN: CPT | Performed by: FAMILY MEDICINE

## 2020-04-06 RX ORDER — AMOXICILLIN 875 MG
875 TABLET ORAL 2 TIMES DAILY
Qty: 20 TABLET | Refills: 0 | Status: SHIPPED | OUTPATIENT
Start: 2020-04-06 | End: 2020-04-16

## 2020-04-06 NOTE — PATIENT INSTRUCTIONS
Thank you for choosing us for your care. I have placed an order for a prescription so that you can start treatment. View your full visit summary for details by clicking on the link below. Your pharmacist will able to address any questions you may have about the medication.     If you're not feeling better within 5-7 days, please schedule an appointment.  You can schedule an appointment right here in [place], or call [phone]  If the visit is for the same symptoms as your e-visit, we'll refund the cost of your e-visit if seen within seven days.      Sinusitis (Antibiotic Treatment)    The sinuses are air-filled spaces within the bones of the face. They connect to the inside of the nose. Sinusitis is an inflammation of the tissue that lines the sinuses. Sinusitis can occur during a cold. It can also happen due to allergies to pollens and other particles in the air. Sinusitis can cause symptoms of sinus congestion and a feeling of fullness. A sinus infection causes fever, headache, and facial pain. There is often green or yellow fluid draining from the nose or into the back of the throat (post-nasal drip). You have been given antibiotics to treat this condition.  Home care    Take the full course of antibiotics as instructed. Do not stop taking them, even when you feel better.    Drink plenty of water, hot tea, and other liquids. This may help thin nasal mucus. It also may help your sinuses drain fluids.    Heat may help soothe painful areas of your face. Use a towel soaked in hot water. Or,  the shower and direct the warm spray onto your face. Using a vaporizer along with a menthol rub at night may also help soothe symptoms.     An expectorant with guaifenesin may help thin nasal mucus and help your sinuses drain fluids.    You can use an over-the-counter decongestant, unless a similar medicine was prescribed to you. Nasal sprays work the fastest. Use one that contains phenylephrine or oxymetazoline.  First blow your nose gently. Then use the spray. Do not use these medicines more often than directed on the label. If you do, your symptoms may get worse. You may also take pills that contain pseudoephedrine. Don t use products that combine multiple medicines. This is because side effects may be increased. Read labels. You can also ask the pharmacist for help. (People with high blood pressure should not use decongestants. They can raise blood pressure.)    Over-the-counter antihistamines may help if allergies contributed to your sinusitis.      Do not use nasal rinses or irrigation during an acute sinus infection, unless your healthcare provider tells you to. Rinsing may spread the infection to other areas in your sinuses.    Use acetaminophen or ibuprofen to control pain, unless another pain medicine was prescribed to you. If you have chronic liver or kidney disease or ever had a stomach ulcer, talk with your healthcare provider before using these medicines. (Aspirin should never be taken by anyone under age 18 who is ill with a fever. It may cause severe liver damage.)    Don't smoke. This can make symptoms worse.  Follow-up care  Follow up with your healthcare provider or our staff if you are not better in 1 week.  When to seek medical advice  Call your healthcare provider if any of these occur:    Facial pain or headache that gets worse    Stiff neck    Unusual drowsiness or confusion    Swelling of your forehead or eyelids    Vision problems, such as blurred or double vision    Fever of 100.4 F (38 C) or higher, or as directed by your healthcare provider    Seizure    Breathing problems    Symptoms don't go away in 10 days  Prevention  Here are steps you can take to help prevent an infection:    Keep good hand washing habits.    Don t have close contact with people who have sore throats, colds, or other upper respiratory infections.    Don t smoke, and stay away from secondhand smoke.    Stay up to date with of  your vaccines.  Date Last Reviewed: 11/1/2017 2000-2019 [employer], [employer] [address] [place] [address], [place] [address]. All rights reserved. This information is not intended as a substitute for professional medical care. Always follow your healthcare professional's instructions.

## 2020-04-10 DIAGNOSIS — E78.2 MIXED HYPERLIPIDEMIA: ICD-10-CM

## 2020-04-10 RX ORDER — ATORVASTATIN CALCIUM 80 MG/1
80 TABLET, FILM COATED ORAL DAILY
Qty: 90 TABLET | Refills: 2 | Status: SHIPPED | OUTPATIENT
Start: 2020-04-10 | End: 2021-01-04

## 2020-04-10 NOTE — TELEPHONE ENCOUNTER
Prescription approved per [facility] Refill Protocol.  Timmy Robles RN, BSN

## 2020-07-29 ENCOUNTER — OFFICE VISIT (OUTPATIENT)
Dept: URGENT CARE | Facility: URGENT CARE | Age: 60
End: 2020-07-29
Payer: COMMERCIAL

## 2020-07-29 ENCOUNTER — ANCILLARY PROCEDURE (OUTPATIENT)
Dept: GENERAL RADIOLOGY | Facility: CLINIC | Age: 60
End: 2020-07-29
Attending: FAMILY MEDICINE
Payer: COMMERCIAL

## 2020-07-29 ENCOUNTER — NURSE TRIAGE (OUTPATIENT)
Dept: FAMILY MEDICINE | Facility: CLINIC | Age: 60
End: 2020-07-29

## 2020-07-29 DIAGNOSIS — R10.13 ABDOMINAL PAIN, EPIGASTRIC: Primary | ICD-10-CM

## 2020-07-29 DIAGNOSIS — A09 DIARRHEA, INFECTIOUS, ADULT: ICD-10-CM

## 2020-07-29 LAB
ALBUMIN UR-MCNC: NEGATIVE MG/DL
APPEARANCE UR: CLEAR
BASOPHILS # BLD AUTO: 0 10E9/L (ref 0–0.2)
BASOPHILS NFR BLD AUTO: 0.4 %
BILIRUB UR QL STRIP: NEGATIVE
COLOR UR AUTO: YELLOW
DIFFERENTIAL METHOD BLD: NORMAL
EOSINOPHIL # BLD AUTO: 0.3 10E9/L (ref 0–0.7)
EOSINOPHIL NFR BLD AUTO: 4.9 %
ERYTHROCYTE [DISTWIDTH] IN BLOOD BY AUTOMATED COUNT: 12.4 % (ref 10–15)
ERYTHROCYTE [SEDIMENTATION RATE] IN BLOOD BY WESTERGREN METHOD: 9 MM/H (ref 0–30)
GLUCOSE UR STRIP-MCNC: NEGATIVE MG/DL
HCT VFR BLD AUTO: 43.6 % (ref 35–47)
HGB BLD-MCNC: 14.5 G/DL (ref 11.7–15.7)
HGB UR QL STRIP: NEGATIVE
KETONES UR STRIP-MCNC: NEGATIVE MG/DL
LEUKOCYTE ESTERASE UR QL STRIP: NEGATIVE
LYMPHOCYTES # BLD AUTO: 2.2 10E9/L (ref 0.8–5.3)
LYMPHOCYTES NFR BLD AUTO: 32.2 %
MCH RBC QN AUTO: 30.9 PG (ref 26.5–33)
MCHC RBC AUTO-ENTMCNC: 33.3 G/DL (ref 31.5–36.5)
MCV RBC AUTO: 93 FL (ref 78–100)
MONOCYTES # BLD AUTO: 0.4 10E9/L (ref 0–1.3)
MONOCYTES NFR BLD AUTO: 6.3 %
NEUTROPHILS # BLD AUTO: 3.8 10E9/L (ref 1.6–8.3)
NEUTROPHILS NFR BLD AUTO: 56.2 %
NITRATE UR QL: NEGATIVE
PH UR STRIP: 7 PH (ref 5–7)
PLATELET # BLD AUTO: 249 10E9/L (ref 150–450)
RBC # BLD AUTO: 4.7 10E12/L (ref 3.8–5.2)
SOURCE: NORMAL
SP GR UR STRIP: 1.02 (ref 1–1.03)
UROBILINOGEN UR STRIP-ACNC: 0.2 EU/DL (ref 0.2–1)
WBC # BLD AUTO: 6.8 10E9/L (ref 4–11)

## 2020-07-29 PROCEDURE — 99214 OFFICE O/P EST MOD 30 MIN: CPT | Performed by: FAMILY MEDICINE

## 2020-07-29 PROCEDURE — 85025 COMPLETE CBC W/AUTO DIFF WBC: CPT | Performed by: FAMILY MEDICINE

## 2020-07-29 PROCEDURE — 80053 COMPREHEN METABOLIC PANEL: CPT | Performed by: FAMILY MEDICINE

## 2020-07-29 PROCEDURE — 83690 ASSAY OF LIPASE: CPT | Performed by: FAMILY MEDICINE

## 2020-07-29 PROCEDURE — 81003 URINALYSIS AUTO W/O SCOPE: CPT | Performed by: FAMILY MEDICINE

## 2020-07-29 PROCEDURE — 85652 RBC SED RATE AUTOMATED: CPT | Performed by: FAMILY MEDICINE

## 2020-07-29 PROCEDURE — 74019 RADEX ABDOMEN 2 VIEWS: CPT

## 2020-07-29 PROCEDURE — 36415 COLL VENOUS BLD VENIPUNCTURE: CPT | Performed by: FAMILY MEDICINE

## 2020-07-29 PROCEDURE — 86769 SARS-COV-2 COVID-19 ANTIBODY: CPT | Performed by: FAMILY MEDICINE

## 2020-07-29 NOTE — TELEPHONE ENCOUNTER
Pt needs sooner appt -  Symptoms for over 1 month.  She does NOT check home BP or BS.   Will be seen in  today     Kelsey Madrid RN    Additional Information    Negative: Shock suspected (e.g., cold/pale/clammy skin, too weak to stand, low BP, rapid pulse)    Negative: Difficult to awaken or acting confused (e.g., disoriented, slurred speech)    Negative: Fainted, and still feels dizzy afterwards    Negative: Severe difficulty breathing (e.g., struggling for each breath, speaks in single words)    Negative: Overdose (accidental or intentional) of medications    Negative: New neurologic deficit that is present now: * Weakness of the face, arm, or leg on one side of the body * Numbness of the face, arm, or leg on one side of the body * Loss of speech or garbled speech    Negative: Heart beating < 50 beats per minute OR > 140 beats per minute    Negative: Sounds like a life-threatening emergency to the triager    Negative: Chest pain    Negative: Rectal bleeding, bloody stool, or tarry-black stool    Negative: Vomiting is the main symptom    Negative: Diarrhea is the main symptom    Negative: Headache is the main symptom    Negative: Heat exhaustion suspected (i.e., dehydration from heat exposure)    Negative: Patient states that he/she is having an anxiety/panic attack    Negative: SEVERE dizziness (e.g., unable to stand, requires support to walk, feels like passing out now)    Negative: SEVERE headache or neck pain    Negative: Spinning or tilting sensation (vertigo) present now and one or more stroke risk factors (i.e., hypertension, diabetes, prior stroke/TIA, heart attack, age over 60) (Exception: prior physician evaluation for this AND no different/worse than usual)    Negative: Loss of vision or double vision    Negative: Extra heart beats OR irregular heart beating (i.e., 'palpitations')    Negative: Difficulty breathing    Negative: Drinking very little and has signs of dehydration (e.g., no urine > 12  "hours, very dry mouth, very lightheaded)    Negative: Follows bleeding (e.g., stomach, rectum, vagina) (Exception: became dizzy from sight of small amount blood)    Negative: Patient sounds very sick or weak to the triager    Vomiting occurs with dizziness    Negative: Lightheadedness (dizziness) present now, after 2 hours of rest and fluids    Negative: Spinning or tilting sensation (vertigo) present now    Negative: Fever > 103 F (39.4 C)    Negative: Fever > 100.0 F (37.8 C) and has diabetes mellitus or a weak immune system (e.g., HIV positive, cancer chemotherapy, organ transplant, splenectomy, chronic steroids)    Answer Assessment - Initial Assessment Questions  1. DESCRIPTION: \"Describe your dizziness.\"      Just dizzy - no vision changes   2. LIGHTHEADED: \"Do you feel lightheaded?\" (e.g., somewhat faint, woozy, weak upon standing)      Yes denies spinning   3. VERTIGO: \"Do you feel like either you or the room is spinning or tilting?\" (i.e. vertigo)      No   4. SEVERITY: \"How bad is it?\"  \"Do you feel like you are going to faint?\" \"Can you stand and walk?\"    - MILD - walking normally    - MODERATE - interferes with normal activities (e.g., work, school)     - SEVERE - unable to stand, requires support to walk, feels like passing out now.       Moderate   5. ONSET:  \"When did the dizziness begin?\"      about a month   6. AGGRAVATING FACTORS: \"Does anything make it worse?\" (e.g., standing, change in head position)      Not really   7. HEART RATE: \"Can you tell me your heart rate?\" \"How many beats in 15 seconds?\"  (Note: not all patients can do this)        Not that I have noticed  8. CAUSE: \"What do you think is causing the dizziness?\"      Unsure   9. RECURRENT SYMPTOM: \"Have you had dizziness before?\" If so, ask: \"When was the last time?\" \"What happened that time?\"     NO   10. OTHER SYMPTOMS: \"Do you have any other symptoms?\" (e.g., fever, chest pain, vomiting, diarrhea, bleeding)        Stomach pain, " "nausea, diarrhea,   11. PREGNANCY: \"Is there any chance you are pregnant?\" \"When was your last menstrual period?\"        NA    Protocols used: DIZZINESS-A-OH      "

## 2020-07-30 VITALS
OXYGEN SATURATION: 95 % | TEMPERATURE: 97.4 F | DIASTOLIC BLOOD PRESSURE: 70 MMHG | SYSTOLIC BLOOD PRESSURE: 170 MMHG | HEART RATE: 67 BPM

## 2020-07-30 LAB
ALBUMIN SERPL-MCNC: 3.8 G/DL (ref 3.4–5)
ALP SERPL-CCNC: 112 U/L (ref 40–150)
ALT SERPL W P-5'-P-CCNC: 50 U/L (ref 0–50)
ANION GAP SERPL CALCULATED.3IONS-SCNC: 6 MMOL/L (ref 3–14)
AST SERPL W P-5'-P-CCNC: 46 U/L (ref 0–45)
BILIRUB SERPL-MCNC: 0.6 MG/DL (ref 0.2–1.3)
BUN SERPL-MCNC: 11 MG/DL (ref 7–30)
CALCIUM SERPL-MCNC: 8.4 MG/DL (ref 8.5–10.1)
CHLORIDE SERPL-SCNC: 104 MMOL/L (ref 94–109)
CO2 SERPL-SCNC: 28 MMOL/L (ref 20–32)
CREAT SERPL-MCNC: 0.56 MG/DL (ref 0.52–1.04)
GFR SERPL CREATININE-BSD FRML MDRD: >90 ML/MIN/{1.73_M2}
GLUCOSE SERPL-MCNC: 121 MG/DL (ref 70–99)
LIPASE SERPL-CCNC: 93 U/L (ref 73–393)
POTASSIUM SERPL-SCNC: 3.9 MMOL/L (ref 3.4–5.3)
PROT SERPL-MCNC: 7.7 G/DL (ref 6.8–8.8)
SODIUM SERPL-SCNC: 138 MMOL/L (ref 133–144)

## 2020-07-30 NOTE — PROGRESS NOTES
SUBJECTIVE:  Chief Complaint   Patient presents with     Urgent Care     Sx Ongoing for a few weeks-Worsening     Abdominal Pain     Feeling Weak-Diarrhea 6 to 7 times a day-Epigastric Pain     Dizziness     Occuring in the last several days-Trying to quit smoking and states she has a cough     Lizzie AMINTA Chow is a 59 year old female whose symptoms began > 4 weeks ago and include abdominal pain epigastric, diarrhea, and lightheadedness.   Patient denies vomiting, URI symptoms and cough.  Has had some SOB-  She associates with quitting smoking  Symptoms are gradual onset, still present, worsening and constant and moderate.      Associated symptoms:  Pain:  Mild diffuse, generalized crampy abdominal pain  Fever: no noted fevers  Diarrhea:  consists of 7 stools/day and is persisting  Stools: runny and loose  Appetite: poor  Vomitin times    Risk factors: none  Patient denies sick contacts, possible bad food exposure, travel , recent antibiotic use, recent hospitalization, recent medication changes and hx of IBS    Past Medical History:   Diagnosis Date     CAD (coronary artery disease) 6/15/14    MI, stent LAD      Chronic pain     both feet intermittently     Edema     furosemide     Elevated glucose      Fatty liver disease, nonalcoholic      HTN (hypertension)      Hyperlipidemia      Ischemic cardiomyopathy     EF 40-45%     Lumbago      Obesity (BMI 30-39.9)      Plantar fasciitis      Stress incontinence      Tobacco abuse      Umbilical hernia     has had surgical consult     Patient Active Problem List   Diagnosis     Former smoker     Flat feet     Family history of coronary artery disease     Fatty liver     Plantar fasciitis     Hypertension goal BP (blood pressure) < 140/90     NSTEMI (non-ST elevated myocardial infarction) (H)     Ischemic cardiomyopathy     Hyperlipidemia with target LDL less than 100     Menopausal state     Type 2 diabetes, HbA1c goal < 7% (H)     Advanced directives,  counseling/discussion     Benign essential hypertension     Acquired hypothyroidism     Type 2 diabetes mellitus without complication, without long-term current use of insulin (H)     Obesity (BMI 35.0-39.9) with comorbidity (H)     Coronary artery disease involving native coronary artery, angina presence unspecified, unspecified whether native or transplanted heart     Coronary artery disease of native heart with stable angina pectoris, unspecified vessel or lesion type (H)       ALLERGIES:  No known drug allergies    MEDs  albuterol (PROAIR HFA/PROVENTIL HFA/VENTOLIN HFA) 108 (90 Base) MCG/ACT inhaler, Inhale 2 puffs into the lungs every 4 hours as needed for shortness of breath / dyspnea  aspirin EC 81 MG EC tablet, Take 1 tablet (81 mg) by mouth daily  atorvastatin (LIPITOR) 80 MG tablet, Take 1 tablet (80 mg) by mouth daily  carvedilol (COREG) 6.25 MG tablet, Take 1 tablet (6.25 mg) by mouth 2 times daily  levothyroxine (SYNTHROID/LEVOTHROID) 50 MCG tablet, Take 1 tablet (50 mcg) by mouth daily  lisinopril (PRINIVIL/ZESTRIL) 2.5 MG tablet, Take 1 tablet (2.5 mg) by mouth daily  nitroGLYcerin (NITROSTAT) 0.4 MG sublingual tablet, Place 1 tablet (0.4 mg) under the tongue every 5 minutes as needed for chest pain  [] amoxicillin (AMOXIL) 875 MG tablet, Take 1 tablet (875 mg) by mouth 2 times daily for 10 days  [] order for DME, Equipment being ordered: Nebulizer with mask and tubing    No current facility-administered medications on file prior to visit.       Social History     Tobacco Use     Smoking status: Current Some Day Smoker     Packs/day: 0.25     Last attempt to quit: 2005     Years since quitting: 15.5     Smokeless tobacco: Never Used   Substance Use Topics     Alcohol use: Yes     Alcohol/week: 0.0 standard drinks     Comment: one time a week       Family History   Problem Relation Age of Onset     Lipids Mother         alive 73     C.A.D. Mother 65        at age 65, heart attack, s/p  stents placement     Hypertension Mother      Diabetes Mother      Cardiovascular Mother      Family History Negative Father         alive 74     C.A.D. Maternal Grandmother 56        fatal MI     Obesity Maternal Grandfather      Hypertension Maternal Grandfather      Obesity Paternal Grandfather      Hypertension Paternal Grandfather      Family History Negative Sister      Family History Negative Sister      Family History Negative Brother      Family History Negative Brother         leukemia at age 18     Diabetes Sister      Obesity Brother      Breast Cancer No family hx of      Cancer - colorectal No family hx of          ROS:  CONSTITUTIONAL:NEGATIVE for fever, chills,   INTEGUMENTARY/SKIN: NEGATIVE for worrisome rashes,  or lesions  EYES: NEGATIVE for vision changes or irritation  ENT/MOUTH: NEGATIVE for ear, mouth and throat problems  RESP:NEGATIVE for significant cough      OBJECTIVE:  BP (!) 170/70   Pulse 67   Temp 97.4  F (36.3  C)   LMP 07/01/2013   SpO2 95%     GENERAL APPEARANCE: fatigued, alert and mild  distress  EYES: EOMI,  PERRL, conjunctiva clear  HENT: ear canals and TM's normal.  Nose and mouth without ulcers, erythema or lesions  NECK: supple, nontender, no lymphadenopathy  RESP: lungs clear to auscultation - no rales, rhonchi or wheezes  CV: regular rates and rhythm, normal S1 S2, no murmur noted  ABDOMEN:  soft, mild, diffuse generalized tenderness, no HSM or masses and bowel sounds active   Extremities:  Motor, sensation intact,  Normal ROM  NEURO: Normal strength and tone, sensory exam grossly normal,  normal speech and mentation  SKIN: no suspicious lesions or rashes    Results for orders placed or performed in visit on 07/29/20   XR Abdomen 2 Views     Status: None    Narrative    ABDOMEN TWO VIEWS 7/29/2020 4:00 PM     HISTORY: Epigastric pain.    COMPARISON: None.      Impression    IMPRESSION: Bowel gas pattern is within normal limits. No convincing  radiographic evidence for  bowel obstruction. No free intraperitoneal  air.    HUYEN DUMONT MD   Comprehensive metabolic panel     Status: Abnormal   Result Value Ref Range    Sodium 138 133 - 144 mmol/L    Potassium 3.9 3.4 - 5.3 mmol/L    Chloride 104 94 - 109 mmol/L    Carbon Dioxide 28 20 - 32 mmol/L    Anion Gap 6 3 - 14 mmol/L    Glucose 121 (H) 70 - 99 mg/dL    Urea Nitrogen 11 7 - 30 mg/dL    Creatinine 0.56 0.52 - 1.04 mg/dL    GFR Estimate >90 >60 mL/min/[1.73_m2]    GFR Estimate If Black >90 >60 mL/min/[1.73_m2]    Calcium 8.4 (L) 8.5 - 10.1 mg/dL    Bilirubin Total 0.6 0.2 - 1.3 mg/dL    Albumin 3.8 3.4 - 5.0 g/dL    Protein Total 7.7 6.8 - 8.8 g/dL    Alkaline Phosphatase 112 40 - 150 U/L    ALT 50 0 - 50 U/L    AST 46 (H) 0 - 45 U/L   Lipase     Status: None   Result Value Ref Range    Lipase 93 73 - 393 U/L   CBC with platelets differential     Status: None   Result Value Ref Range    WBC 6.8 4.0 - 11.0 10e9/L    RBC Count 4.70 3.8 - 5.2 10e12/L    Hemoglobin 14.5 11.7 - 15.7 g/dL    Hematocrit 43.6 35.0 - 47.0 %    MCV 93 78 - 100 fl    MCH 30.9 26.5 - 33.0 pg    MCHC 33.3 31.5 - 36.5 g/dL    RDW 12.4 10.0 - 15.0 %    Platelet Count 249 150 - 450 10e9/L    Diff Method Automated Method     % Neutrophils 56.2 %    % Lymphocytes 32.2 %    % Monocytes 6.3 %    % Eosinophils 4.9 %    % Basophils 0.4 %    Absolute Neutrophil 3.8 1.6 - 8.3 10e9/L    Absolute Lymphocytes 2.2 0.8 - 5.3 10e9/L    Absolute Monocytes 0.4 0.0 - 1.3 10e9/L    Absolute Eosinophils 0.3 0.0 - 0.7 10e9/L    Absolute Basophils 0.0 0.0 - 0.2 10e9/L   Erythrocyte sedimentation rate auto     Status: None   Result Value Ref Range    Sed Rate 9 0 - 30 mm/h   UA reflex to Microscopic and Culture     Status: None    Specimen: Midstream Urine   Result Value Ref Range    Color Urine Yellow     Appearance Urine Clear     Glucose Urine Negative NEG^Negative mg/dL    Bilirubin Urine Negative NEG^Negative    Ketones Urine Negative NEG^Negative mg/dL    Specific Gravity  Urine 1.025 1.003 - 1.035    Blood Urine Negative NEG^Negative    pH Urine 7.0 5.0 - 7.0 pH    Protein Albumin Urine Negative NEG^Negative mg/dL    Urobilinogen Urine 0.2 0.2 - 1.0 EU/dL    Nitrite Urine Negative NEG^Negative    Leukocyte Esterase Urine Negative NEG^Negative    Source Midstream Urine          ASSESSMENT:  Abdominal pain, epigastric     - Comprehensive metabolic panel  - Lipase  - XR Abdomen 2 Views  - CBC with platelets differential  - Erythrocyte sedimentation rate auto  - UA reflex to Microscopic and Culture  - COVID-19 Virus (Coronavirus) Antibody Screen, IgG    No signs of significant lab abnormalities as cause of her symptoms    Diarrhea, infectious, adult     - COVID-19 Virus (Coronavirus) Antibody Screen, IgG  - CLOSTRIDIUM DIFFICILE TOXIN B PCR; Future  - Cryptosporidium/Giardia Immunoassay; Future  - Enteric Bacteria and Virus Panel by JOHN Stool; Future  - Ova and Parasite Exam Routine; Future       Diet: small amounts clear fluids frequently,soups,juices,water,advance diet as tolerated  Rest as much as possible.  Patient was advised to go to the ER if there is persistent vomiting and unable to keep down liquids  and/or severe weakness/ listlessness.    Follow-up with PCP if not improving  Advise careful hand washing to reduce the risk of passing the illness to others in close contact        Due to the patient's severe, prolonged diarrhea we discussed concerns about dehydration,   ,  Incapacitating illness  that would be detected with stool testing     Stool testing for enteric pathogens was advised, including testing for viral, bacterial, antibiotic associated and parasitic causes of gastroenteritis.  Patient did  agree to perform stool testing    Treatment for the gastroenteritis would be directed against the infectious cause detected in the stool samples

## 2020-07-31 LAB
COVID-19 ANTIBODY IGG: NEGATIVE
LAB TEST METHOD: NORMAL

## 2020-08-27 DIAGNOSIS — I25.118 CORONARY ARTERY DISEASE OF NATIVE HEART WITH STABLE ANGINA PECTORIS, UNSPECIFIED VESSEL OR LESION TYPE (H): ICD-10-CM

## 2020-08-28 RX ORDER — NITROGLYCERIN 0.4 MG/1
0.4 TABLET SUBLINGUAL EVERY 5 MIN PRN
Qty: 25 TABLET | Refills: 1 | Status: SHIPPED | OUTPATIENT
Start: 2020-08-28

## 2020-08-28 NOTE — TELEPHONE ENCOUNTER
Routing refill request to provider for review/approval because:  Due to BP    BP Readings from Last 3 Encounters:   07/30/20 (!) 170/70   12/03/19 (!) 142/86   03/20/19 138/86       Kelsey Madrid RN

## 2020-09-10 ENCOUNTER — OFFICE VISIT (OUTPATIENT)
Dept: FAMILY MEDICINE | Facility: CLINIC | Age: 60
End: 2020-09-10
Payer: COMMERCIAL

## 2020-09-10 VITALS
OXYGEN SATURATION: 99 % | HEART RATE: 65 BPM | RESPIRATION RATE: 16 BRPM | TEMPERATURE: 97.5 F | WEIGHT: 212 LBS | SYSTOLIC BLOOD PRESSURE: 138 MMHG | HEIGHT: 66 IN | DIASTOLIC BLOOD PRESSURE: 88 MMHG | BODY MASS INDEX: 34.07 KG/M2

## 2020-09-10 DIAGNOSIS — I25.5 ISCHEMIC CARDIOMYOPATHY: ICD-10-CM

## 2020-09-10 DIAGNOSIS — R19.5 LOOSE STOOLS: ICD-10-CM

## 2020-09-10 DIAGNOSIS — R42 LIGHTHEADEDNESS: ICD-10-CM

## 2020-09-10 DIAGNOSIS — I10 BENIGN ESSENTIAL HYPERTENSION: ICD-10-CM

## 2020-09-10 DIAGNOSIS — E03.9 ACQUIRED HYPOTHYROIDISM: ICD-10-CM

## 2020-09-10 DIAGNOSIS — R10.13 ABDOMINAL PAIN, EPIGASTRIC: ICD-10-CM

## 2020-09-10 DIAGNOSIS — E11.9 TYPE 2 DIABETES MELLITUS WITHOUT COMPLICATION, WITHOUT LONG-TERM CURRENT USE OF INSULIN (H): ICD-10-CM

## 2020-09-10 DIAGNOSIS — Z23 NEED FOR PROPHYLACTIC VACCINATION AND INOCULATION AGAINST INFLUENZA: Primary | ICD-10-CM

## 2020-09-10 LAB
CREAT UR-MCNC: 62 MG/DL
HBA1C MFR BLD: 6.2 % (ref 0–5.6)
LIPASE SERPL-CCNC: 86 U/L (ref 73–393)
MICROALBUMIN UR-MCNC: 11 MG/L
MICROALBUMIN/CREAT UR: 17.47 MG/G CR (ref 0–25)

## 2020-09-10 PROCEDURE — 80053 COMPREHEN METABOLIC PANEL: CPT | Performed by: FAMILY MEDICINE

## 2020-09-10 PROCEDURE — 83516 IMMUNOASSAY NONANTIBODY: CPT | Mod: 59 | Performed by: FAMILY MEDICINE

## 2020-09-10 PROCEDURE — 83690 ASSAY OF LIPASE: CPT | Performed by: FAMILY MEDICINE

## 2020-09-10 PROCEDURE — 90686 IIV4 VACC NO PRSV 0.5 ML IM: CPT | Performed by: FAMILY MEDICINE

## 2020-09-10 PROCEDURE — 90471 IMMUNIZATION ADMIN: CPT | Performed by: FAMILY MEDICINE

## 2020-09-10 PROCEDURE — 99214 OFFICE O/P EST MOD 30 MIN: CPT | Mod: 25 | Performed by: FAMILY MEDICINE

## 2020-09-10 PROCEDURE — 83516 IMMUNOASSAY NONANTIBODY: CPT | Performed by: FAMILY MEDICINE

## 2020-09-10 PROCEDURE — 83036 HEMOGLOBIN GLYCOSYLATED A1C: CPT | Performed by: FAMILY MEDICINE

## 2020-09-10 PROCEDURE — 93000 ELECTROCARDIOGRAM COMPLETE: CPT | Performed by: FAMILY MEDICINE

## 2020-09-10 PROCEDURE — 36415 COLL VENOUS BLD VENIPUNCTURE: CPT | Performed by: FAMILY MEDICINE

## 2020-09-10 PROCEDURE — 82043 UR ALBUMIN QUANTITATIVE: CPT | Performed by: FAMILY MEDICINE

## 2020-09-10 RX ORDER — LEVOTHYROXINE SODIUM 25 UG/1
25 TABLET ORAL DAILY
COMMUNITY
Start: 2020-09-10 | End: 2020-10-26

## 2020-09-10 ASSESSMENT — MIFFLIN-ST. JEOR: SCORE: 1553.38

## 2020-09-10 NOTE — PROGRESS NOTES
Called [facility] and the exam is scanned into the chart in April 2020.  Pt has not had an exam since 8/7/2019.  Will remind her that she is due   Timmy Robles RN, BSN

## 2020-09-10 NOTE — PROGRESS NOTES
Subjective     Lizzie Chow is a 59 year old female who presents to clinic today for the following health issues:    HPI       Abdominal/Flank Pain - also leg issues - pt states it may be related  Onset/Duration: ongoing for about 1 year  Description:   Character: Fullness and pressure  Location: epigastric region  Radiation: None  Intensity: moderate  Progression of Symptoms:  worsening  Accompanying Signs & Symptoms:  Fever/Chills: no  Gas/Bloating: YES  Nausea: YES  Vomitting: no  Diarrhea: YES  Constipation: no  Dysuria or Hematuria: no  History:   Trauma: no  Previous similar pain: about a year,. Possible ciliac  Previous tests done: was seen in UC and ER  Precipitating factors:   Does the pain change with:     Food: has changed it up    Bowel Movement: irregular    Urination: no   Other factors:  Leg pain  Therapies tried and outcome: GASX and imodium  Patient's last menstrual period was 07/01/2013.      Reports she has loose stools following every meal for the past 6 months. Feels a pressure sensation in her epigastrium after eating, relieved following passing stool.     Having loose stools maybe 6-7 times per day.     Had one meal last week that she didn't need to stool following.     Becoming for gassy past month.     Has tried to eliminate some foods, didn't seem to help. Knows fried foods, meat cause immediate loose stools.     Was seen in UC 2 months ago, stool studies ordered. Lizzie reports she brought back sample but I see they weren't able to process results.           Vertigo- may be thyroid related- changed does  Onset/Duration: last few weeks  Description:   Do you feel faint: no  Does it feel like the surroundings (bed, room) are moving: YES  Unsteady/off balance: at times  Have you passed out or fallen: 1x- fell  Intensity: moderate  Progression of Symptoms: worsening- but will discuss  Accompanying Signs & Symptoms:  Heart palpitations or chest pain: YES  Nausea, vomiting: nausea  Weakness or lack  "of coordination in arms or legs: weak legs, numb feet and pain  Vision or speech changes: no  Numbness or tingling: in feet  Ringing in ears (Tinnitus): no  Hearing Loss: no  History:   Head trauma/concussion history: no  Previous similar symptoms: no  Recent bleeding history: no  Any new medications (BP?): will talk about Thyroid  Precipitating factors:   Worse with activity: may be medication related  Worse with head movement: no  Alleviating factors:   Does staying in a fixed position give relief: no   Therapies tried and outcome: None      Vertigo for the past several months. She can tie this back to increase in levothyroxine. Was increased due to high TSH.     Episodes typically coming daily, between 10am-noon. Feels uneasy, a bit sweaty, dyspneic. Most of this time, she is at work and wearing a mask.     Cut back her levothyroxine to 25 mcg daily the past couple weeks, and has only had 1 episode.     History of CAD, NSTEMI. Last stress in 2018 was normal.       Review of Systems   Constitutional, HEENT, cardiovascular, pulmonary, gi and gu systems are negative, except as otherwise noted.      Objective    /88 (BP Location: Right arm, Patient Position: Chair, Cuff Size: Adult Large)   Pulse 65   Temp 97.5  F (36.4  C) (Oral)   Resp 16   Ht 1.676 m (5' 6\")   Wt 96.2 kg (212 lb)   LMP 07/01/2013   SpO2 99%   BMI 34.22 kg/m    Body mass index is 34.22 kg/m .  Physical Exam   GENERAL: healthy, alert and no distress  EYES: Eyes grossly normal to inspection, PERRL and conjunctivae and sclerae normal  HENT: ear canals and TM's normal, nose and mouth without ulcers or lesions  NECK: no adenopathy, no asymmetry, masses, or scars and thyroid normal to palpation  RESP: lungs clear to auscultation - no rales, rhonchi or wheezes  CV: regular rate and rhythm, normal S1 S2, no S3 or S4, no murmur, click or rub, no peripheral edema and peripheral pulses strong  ABDOMEN: soft, nontender, no hepatosplenomegaly, no " masses and bowel sounds normal  PSYCH: mentation appears normal, affect normal/bright      EKG - NSR    Assessment & Plan     1. Abdominal pain, epigastric - unclear etiology - consider IBS versus IBD versus infectious cause. Lab work as below. Stool studies as previously ordered by UC.   - Comprehensive metabolic panel (BMP + Alb, Alk Phos, ALT, AST, Total. Bili, TP)  - Tissue transglutaminase sonja IgA and IgG  - Lipase    2. Loose stools - as above    3. Need for prophylactic vaccination and inoculation against influenza  - INFLUENZA VACCINE IM > 6 MONTHS VALENT IIV4 [29834]  - Vaccine Administration, Initial [[id number]]    4. Lightheadedness - discussed too early to run thyroid levels, needs in 6 weeks. ENG normal today  - TSH with free T4 reflex  - EKG 12-lead complete w/read - Clinics    5. Type 2 diabetes mellitus without complication, without long-term current use of insulin (H) - overdue for surveillance labs  - Hemoglobin A1c  - Albumin Random Urine Quantitative with Creat Ratio    6. Acquired hypothyroidism  - TSH with free T4 reflex    7. Ischemic cardiomyopathy    8. Benign essential hypertension - in range, continue current    Return in about 3 months (around 12/10/2020) for Diabetes Visit.    Gladys Solis MD  [facility]

## 2020-09-11 LAB
ALBUMIN SERPL-MCNC: 3.7 G/DL (ref 3.4–5)
ALP SERPL-CCNC: 102 U/L (ref 40–150)
ALT SERPL W P-5'-P-CCNC: 46 U/L (ref 0–50)
ANION GAP SERPL CALCULATED.3IONS-SCNC: 9 MMOL/L (ref 3–14)
AST SERPL W P-5'-P-CCNC: 35 U/L (ref 0–45)
BILIRUB SERPL-MCNC: 0.8 MG/DL (ref 0.2–1.3)
BUN SERPL-MCNC: 13 MG/DL (ref 7–30)
CALCIUM SERPL-MCNC: 9 MG/DL (ref 8.5–10.1)
CHLORIDE SERPL-SCNC: 101 MMOL/L (ref 94–109)
CO2 SERPL-SCNC: 27 MMOL/L (ref 20–32)
CREAT SERPL-MCNC: 0.65 MG/DL (ref 0.52–1.04)
GFR SERPL CREATININE-BSD FRML MDRD: >90 ML/MIN/{1.73_M2}
GLUCOSE SERPL-MCNC: 128 MG/DL (ref 70–99)
POTASSIUM SERPL-SCNC: 4.2 MMOL/L (ref 3.4–5.3)
PROT SERPL-MCNC: 7.4 G/DL (ref 6.8–8.8)
SODIUM SERPL-SCNC: 137 MMOL/L (ref 133–144)

## 2020-09-15 LAB
TTG IGA SER-ACNC: 1 U/ML
TTG IGG SER-ACNC: 1 U/ML

## 2020-10-22 DIAGNOSIS — E03.9 ACQUIRED HYPOTHYROIDISM: ICD-10-CM

## 2020-10-22 PROCEDURE — 84439 ASSAY OF FREE THYROXINE: CPT | Performed by: FAMILY MEDICINE

## 2020-10-22 PROCEDURE — 84443 ASSAY THYROID STIM HORMONE: CPT | Performed by: FAMILY MEDICINE

## 2020-10-22 PROCEDURE — 36415 COLL VENOUS BLD VENIPUNCTURE: CPT | Performed by: FAMILY MEDICINE

## 2020-10-23 LAB
T4 FREE SERPL-MCNC: 0.95 NG/DL (ref 0.76–1.46)
TSH SERPL DL<=0.005 MIU/L-ACNC: 4.84 MU/L (ref 0.4–4)

## 2020-10-26 ENCOUNTER — TELEPHONE (OUTPATIENT)
Dept: FAMILY MEDICINE | Facility: CLINIC | Age: 60
End: 2020-10-26

## 2020-10-26 DIAGNOSIS — E03.9 ACQUIRED HYPOTHYROIDISM: Primary | ICD-10-CM

## 2020-10-26 RX ORDER — LEVOTHYROXINE SODIUM 25 UG/1
TABLET ORAL
Qty: 90 TABLET | Refills: 1 | Status: SHIPPED | OUTPATIENT
Start: 2020-10-26 | End: 2021-01-07

## 2020-10-26 RX ORDER — LEVOTHYROXINE SODIUM 50 UG/1
TABLET ORAL
Qty: 90 TABLET | Refills: 1 | Status: SHIPPED | OUTPATIENT
Start: 2020-10-26 | End: 2021-01-07

## 2020-10-26 NOTE — PROGRESS NOTES
Recent TSH levels a bit high, suggest increase in levo dose.     Have her take 225 mcg 4 days of the week, and 50 mcg 3 days of the week.     Refills sent.     Recheck labs in 8 weeks, order in. NADYA

## 2020-11-30 DIAGNOSIS — I10 BENIGN ESSENTIAL HYPERTENSION: ICD-10-CM

## 2020-11-30 RX ORDER — LISINOPRIL 2.5 MG/1
2.5 TABLET ORAL DAILY
Qty: 90 TABLET | Refills: 3 | Status: SHIPPED | OUTPATIENT
Start: 2020-11-30 | End: 2021-11-18 | Stop reason: DRUGHIGH

## 2020-11-30 NOTE — TELEPHONE ENCOUNTER
Prescription approved per [facility] Refill Protocol.  Timmy Robles RN, BSN

## 2020-12-03 DIAGNOSIS — E11.9 TYPE 2 DIABETES MELLITUS WITHOUT COMPLICATION, WITHOUT LONG-TERM CURRENT USE OF INSULIN (H): Primary | ICD-10-CM

## 2020-12-03 DIAGNOSIS — E03.9 ACQUIRED HYPOTHYROIDISM: ICD-10-CM

## 2020-12-03 DIAGNOSIS — I25.10 CORONARY ARTERY DISEASE INVOLVING NATIVE CORONARY ARTERY, ANGINA PRESENCE UNSPECIFIED, UNSPECIFIED WHETHER NATIVE OR TRANSPLANTED HEART: ICD-10-CM

## 2020-12-03 LAB
HBA1C MFR BLD: 6.4 % (ref 0–5.6)
T4 FREE SERPL-MCNC: 0.91 NG/DL (ref 0.76–1.46)
TSH SERPL DL<=0.005 MIU/L-ACNC: 4.92 MU/L (ref 0.4–4)

## 2020-12-03 PROCEDURE — 80061 LIPID PANEL: CPT | Performed by: FAMILY MEDICINE

## 2020-12-03 PROCEDURE — 36415 COLL VENOUS BLD VENIPUNCTURE: CPT | Performed by: FAMILY MEDICINE

## 2020-12-03 PROCEDURE — 83036 HEMOGLOBIN GLYCOSYLATED A1C: CPT | Performed by: FAMILY MEDICINE

## 2020-12-03 PROCEDURE — 84439 ASSAY OF FREE THYROXINE: CPT | Performed by: FAMILY MEDICINE

## 2020-12-03 PROCEDURE — 84443 ASSAY THYROID STIM HORMONE: CPT | Performed by: FAMILY MEDICINE

## 2020-12-04 LAB
CHOLEST SERPL-MCNC: 192 MG/DL
HDLC SERPL-MCNC: 60 MG/DL
LDLC SERPL CALC-MCNC: 69 MG/DL
NONHDLC SERPL-MCNC: 132 MG/DL
TRIGL SERPL-MCNC: 315 MG/DL

## 2021-01-02 DIAGNOSIS — E78.2 MIXED HYPERLIPIDEMIA: ICD-10-CM

## 2021-01-02 DIAGNOSIS — I25.118 CORONARY ARTERY DISEASE OF NATIVE HEART WITH STABLE ANGINA PECTORIS, UNSPECIFIED VESSEL OR LESION TYPE (H): ICD-10-CM

## 2021-01-04 RX ORDER — ATORVASTATIN CALCIUM 80 MG/1
80 TABLET, FILM COATED ORAL DAILY
Qty: 90 TABLET | Refills: 2 | Status: SHIPPED | OUTPATIENT
Start: 2021-01-04 | End: 2021-10-06

## 2021-01-04 RX ORDER — CARVEDILOL 6.25 MG/1
6.25 TABLET ORAL 2 TIMES DAILY
Qty: 180 TABLET | Refills: 2 | Status: SHIPPED | OUTPATIENT
Start: 2021-01-04 | End: 2021-10-06

## 2021-01-04 NOTE — PROGRESS NOTES
"Pre-Visit Planning   Next 5 appointments (look out 90 days)    Jan 07, 2021 11:40 AM  (Arrive by 11:25 AM)  Phone Visit with Gladys Solis MD  [facility] ([facility] [address] [facility] [address] [phone]        Appointment Notes for this encounter:   [phone] unable to do video DM     Questionnaires Reviewed/Assigned  Additional questionnaires assigned      Patient preferred phone number: [phone]      Spoke to patient via phone. Are there any additional questions or concerns you'd like to review with your provider during your visit? No    Patient does not have additional questions or concerns.        Visit is not preventive.    Meds  Is there anything on your medication list that needs to be updated? No    Current Outpatient Medications   Medication     albuterol (PROAIR HFA/PROVENTIL HFA/VENTOLIN HFA) 108 (90 Base) MCG/ACT inhaler     aspirin EC 81 MG EC tablet     atorvastatin (LIPITOR) 80 MG tablet     carvedilol (COREG) 6.25 MG tablet     levothyroxine (SYNTHROID/LEVOTHROID) 25 MCG tablet     levothyroxine (SYNTHROID/LEVOTHROID) 50 MCG tablet     lisinopril (ZESTRIL) 2.5 MG tablet     nitroGLYcerin (NITROSTAT) 0.4 MG sublingual tablet     No current facility-administered medications for this visit.      Which pharmacy do you prefer to use for medications during this visit if needed? [facility]     Do you need refills on any of your medications? No    Health Maintenance Due   Topic Date Due     ANNUAL REVIEW OF HM ORDERS  1960     HIV SCREENING  12/19/1975     MAMMO SCREENING  02/12/2015     PREVENTIVE CARE VISIT  11/03/2017     DIABETIC FOOT EXAM  07/25/2019     EYE EXAM  08/07/2020     PHQ-2  01/01/2021     Patient is due for:  preventive care visit      [employer]  Patient is active on [employer].    Questionnaire Review   Offered information on completing questionnaires via [employer].    Call Summary  \"Thank you for your time today.  If " "anything comes up before your appointment, please feel free to contact us at [phone]."    "

## 2021-01-04 NOTE — TELEPHONE ENCOUNTER
Prescription approved per [facility] Refill Protocol.  Timmy Robles RN, BSN

## 2021-01-07 ENCOUNTER — VIRTUAL VISIT (OUTPATIENT)
Dept: FAMILY MEDICINE | Facility: CLINIC | Age: 61
End: 2021-01-07

## 2021-01-07 VITALS — BODY MASS INDEX: 32.49 KG/M2 | HEIGHT: 67 IN | WEIGHT: 207 LBS | TEMPERATURE: 98.4 F

## 2021-01-07 DIAGNOSIS — I25.118 CORONARY ARTERY DISEASE OF NATIVE HEART WITH STABLE ANGINA PECTORIS, UNSPECIFIED VESSEL OR LESION TYPE (H): ICD-10-CM

## 2021-01-07 DIAGNOSIS — I10 BENIGN ESSENTIAL HYPERTENSION: ICD-10-CM

## 2021-01-07 DIAGNOSIS — E66.01 MORBID OBESITY (H): ICD-10-CM

## 2021-01-07 DIAGNOSIS — Z12.31 ENCOUNTER FOR SCREENING MAMMOGRAM FOR BREAST CANCER: ICD-10-CM

## 2021-01-07 DIAGNOSIS — E03.9 ACQUIRED HYPOTHYROIDISM: ICD-10-CM

## 2021-01-07 DIAGNOSIS — Z87.891 FORMER SMOKER: ICD-10-CM

## 2021-01-07 DIAGNOSIS — E11.9 TYPE 2 DIABETES MELLITUS WITHOUT COMPLICATION, WITHOUT LONG-TERM CURRENT USE OF INSULIN (H): Primary | ICD-10-CM

## 2021-01-07 DIAGNOSIS — E78.5 HYPERLIPIDEMIA WITH TARGET LDL LESS THAN 100: ICD-10-CM

## 2021-01-07 PROCEDURE — 99214 OFFICE O/P EST MOD 30 MIN: CPT | Mod: 95 | Performed by: FAMILY MEDICINE

## 2021-01-07 RX ORDER — LEVOTHYROXINE SODIUM 50 UG/1
TABLET ORAL
Qty: 90 TABLET | Refills: 1 | Status: SHIPPED | OUTPATIENT
Start: 2021-01-07 | End: 2021-11-26

## 2021-01-07 RX ORDER — LEVOTHYROXINE SODIUM 25 UG/1
TABLET ORAL
Qty: 90 TABLET | Refills: 1 | Status: SHIPPED | OUTPATIENT
Start: 2021-01-07 | End: 2021-12-30

## 2021-01-07 ASSESSMENT — MIFFLIN-ST. JEOR: SCORE: 1541.58

## 2021-01-07 NOTE — PROGRESS NOTES
Lizzie is a 60 year old who is being evaluated via a billable telephone visit.      What phone number would you like to be contacted at?   How would you like to obtain your AVS? MyChart         Assessment & Plan     Type 2 diabetes mellitus without complication, without long-term current use of insulin (H) - well controlled, working on diet, recheck 3 months.     Acquired hypothyroidism - TSH levels still a bit high although T4 levels in range. She was having dizzy spells on 50 mcg daily prior to change. Will switch from 50 mcg 3 days of the week to 4 days of the week, 25 mcg rest of the week.   - levothyroxine (SYNTHROID/LEVOTHROID) 25 MCG tablet; Take 3 days of the week  - levothyroxine (SYNTHROID/LEVOTHROID) 50 MCG tablet; Take 4 days of the week    Obesity (BMI 35.0-39.9) with comorbidity (H) - working on weight loss through diet.     Hyperlipidemia with target LDL less than 100 - on statin, continue    Coronary artery disease of native heart with stable angina pectoris, unspecified vessel or lesion type (H) - on statin, BB, ACEI, ASA.     Benign essential hypertension - last BP in range, continue current for now, BP check during F2F DM2 visit in 3 months.     Former smoker - encouraged her efforts    Encounter for screening mammogram for breast cancer  - MA SCREENING DIGITAL BILAT - Future  (s+30); Future       Return in about 3 months (around 4/7/2021) for Diabetes Visit, In-Office visit.    Gladys Solis MD  [facility] 60 [facility] who presents to clinic today for the following health issues    HPI       Diabetes Follow-up      How often are you checking your blood sugar? Not at all    What concerns do you have today about your diabetes? None     Do you have any of these symptoms? (Select all that apply)  Numbness in feet  Have you had a diabetic eye exam in the last 12 months? No  - will  Make appt soon      Diet controlled.     History of HLD, NSTEMI. On  statin, ACEI, BB, ASA.     Quit smoking in August 2020, doing well with this.       Hyperlipidemia Follow-Up      Are you regularly taking any medication or supplement to lower your cholesterol?   Yes- .    Are you having muscle aches or other side effects that you think could be caused by your cholesterol lowering medication?  No    Hypertension Follow-up      Do you check your blood pressure regularly outside of the clinic? No     Are you following a low salt diet? No    Are your blood pressures ever more than 140 on the top number (systolic) OR more   than 90 on the bottom number (diastolic), for example 140/90? Yes - sometimes    BP Readings from Last 2 Encounters:   09/10/20 138/88   07/30/20 (!) 170/70     Hemoglobin A1C (%)   Date Value   12/03/2020 6.4 (H)   09/10/2020 6.2 (H)     LDL Cholesterol Calculated (mg/dL)   Date Value   12/03/2020 69   12/03/2019 44         How many servings of fruits and vegetables do you eat daily?  2-3    On average, how many sweetened beverages do you drink each day (Examples: soda, juice, sweet tea, etc.  Do NOT count diet or artificially sweetened beverages)?   1    How many days per week do you exercise enough to make your heart beat faster? 3 or less    How many minutes a day do you exercise enough to make your heart beat faster? 30 - 60  How many days per week do you miss taking your medication? 3    What makes it hard for you to take your medications?  remembering to take      Review of Systems   Constitutional, HEENT, cardiovascular, pulmonary, gi and gu systems are negative, except as otherwise noted.      Objective           Vitals:  No vitals were obtained today due to virtual visit.    Physical Exam   PSYCH: Alert and oriented times 3; coherent speech, normal   rate and volume, able to articulate logical thoughts, able   to abstract reason, no tangential thoughts, no hallucinations   or delusions  Her affect is normal  RESP: No cough, no audible wheezing, able to  talk in full sentences  Remainder of exam unable to be completed due to telephone visits    Lab Results   Component Value Date    A1C 6.4 12/03/2020    A1C 6.2 09/10/2020    A1C 6.2 12/03/2019    A1C 6.7 03/20/2019    A1C 6.5 07/25/2018           Phone call duration: 13 minutes

## 2021-01-07 NOTE — PROGRESS NOTES
Future Appointments   Date Time Provider Department Center   1/7/2021 11:40 AM Gladys Solis MD [facility]     Appointment Notes for this encounter:   [phone] unable to do video DM     Health Maintenance Due   Topic Date Due     ANNUAL REVIEW OF HM ORDERS  1960     HIV SCREENING  12/19/1975     MAMMO SCREENING  02/12/2015     PREVENTIVE CARE VISIT  11/03/2017     DIABETIC FOOT EXAM  07/25/2019     EYE EXAM  08/07/2020     PHQ-2  01/01/2021     Health Maintenance addressed:  Mammogram, Eye Exam and PHQ2    Mammogram Pt agrees to schedule appt today or future appt scheduled and Eye Exam Pt agrees to schedule appt today    MyChart Status:  Active and Using

## 2021-01-15 ENCOUNTER — HEALTH MAINTENANCE LETTER (OUTPATIENT)
Age: 61
End: 2021-01-15

## 2021-01-26 DIAGNOSIS — Z12.39 ENCOUNTER FOR OTHER SCREENING FOR MALIGNANT NEOPLASM OF BREAST: Primary | ICD-10-CM

## 2021-03-06 ENCOUNTER — ANCILLARY PROCEDURE (OUTPATIENT)
Dept: MAMMOGRAPHY | Facility: CLINIC | Age: 61
End: 2021-03-06
Attending: FAMILY MEDICINE
Payer: COMMERCIAL

## 2021-03-06 DIAGNOSIS — Z12.39 ENCOUNTER FOR OTHER SCREENING FOR MALIGNANT NEOPLASM OF BREAST: ICD-10-CM

## 2021-03-06 PROCEDURE — 77063 BREAST TOMOSYNTHESIS BI: CPT | Mod: TC | Performed by: RADIOLOGY

## 2021-03-06 PROCEDURE — 77067 SCR MAMMO BI INCL CAD: CPT | Mod: TC | Performed by: RADIOLOGY

## 2021-03-20 ENCOUNTER — HEALTH MAINTENANCE LETTER (OUTPATIENT)
Age: 61
End: 2021-03-20

## 2021-04-07 ENCOUNTER — TELEPHONE (OUTPATIENT)
Dept: FAMILY MEDICINE | Facility: CLINIC | Age: 61
End: 2021-04-07

## 2021-04-07 NOTE — LETTER
April 14, 2021      Lizzie Chow  [address] [address] [place] [id number]        Dear Lizzie,     We have been trying to reach you by phone and [employer] to help you schedule your office visit with Dr Solis that is due this month.  Please call the clinic to schedule or log into your MyChart to schedule.      Sincerely,      Timmy Robles RN, BSN, PHN/Emily Mac RN  Patient Advocate and Liaison (PAL)  Ph: [phone]

## 2021-04-14 NOTE — TELEPHONE ENCOUNTER
Letter sent.  Pt not checking mychart or returning call    Pt needs DM in clinic visit with PCP  Timmy Robles RN, BSN

## 2021-04-21 ENCOUNTER — MYC MEDICAL ADVICE (OUTPATIENT)
Dept: FAMILY MEDICINE | Facility: CLINIC | Age: 61
End: 2021-04-21

## 2021-04-21 NOTE — PROGRESS NOTES
Pre-Visit Planning   Next 5 appointments (look out 90 days)    Apr 27, 2021  3:15 PM  (Arrive by 2:55 PM)  Office Visit with Gladys Solis MD  [facility] ([facility] ) [id number] [facility] [address] [phone]        Appointment Notes for this encounter:   DM     Questionnaires Reviewed/Assigned  No additional questionnaires are needed    Patient preferred phone number: [phone]    MyChart sent. Unable to reach. Left voicemail. Advised patient to call clinic back at [phone].

## 2021-04-27 ENCOUNTER — OFFICE VISIT (OUTPATIENT)
Dept: FAMILY MEDICINE | Facility: CLINIC | Age: 61
End: 2021-04-27
Payer: COMMERCIAL

## 2021-04-27 VITALS
BODY MASS INDEX: 33.43 KG/M2 | DIASTOLIC BLOOD PRESSURE: 88 MMHG | HEART RATE: 76 BPM | WEIGHT: 213 LBS | HEIGHT: 67 IN | SYSTOLIC BLOOD PRESSURE: 136 MMHG | RESPIRATION RATE: 14 BRPM | TEMPERATURE: 98.6 F

## 2021-04-27 DIAGNOSIS — E78.5 HYPERLIPIDEMIA WITH TARGET LDL LESS THAN 100: ICD-10-CM

## 2021-04-27 DIAGNOSIS — R42 LIGHTHEADEDNESS: ICD-10-CM

## 2021-04-27 DIAGNOSIS — Z87.891 PERSONAL HISTORY OF TOBACCO USE: ICD-10-CM

## 2021-04-27 DIAGNOSIS — I25.2 HISTORY OF NON-ST ELEVATION MYOCARDIAL INFARCTION (NSTEMI): ICD-10-CM

## 2021-04-27 DIAGNOSIS — I10 BENIGN ESSENTIAL HYPERTENSION: ICD-10-CM

## 2021-04-27 DIAGNOSIS — E11.9 TYPE 2 DIABETES MELLITUS WITHOUT COMPLICATION, WITHOUT LONG-TERM CURRENT USE OF INSULIN (H): Primary | ICD-10-CM

## 2021-04-27 DIAGNOSIS — E66.01 MORBID OBESITY (H): ICD-10-CM

## 2021-04-27 LAB — HBA1C MFR BLD: 6.4 % (ref 0–5.6)

## 2021-04-27 PROCEDURE — 83036 HEMOGLOBIN GLYCOSYLATED A1C: CPT | Performed by: FAMILY MEDICINE

## 2021-04-27 PROCEDURE — 36415 COLL VENOUS BLD VENIPUNCTURE: CPT | Performed by: FAMILY MEDICINE

## 2021-04-27 PROCEDURE — 99207 PR FOOT EXAM NO CHARGE: CPT | Mod: 25 | Performed by: FAMILY MEDICINE

## 2021-04-27 PROCEDURE — 80048 BASIC METABOLIC PNL TOTAL CA: CPT | Performed by: FAMILY MEDICINE

## 2021-04-27 PROCEDURE — G0296 VISIT TO DETERM LDCT ELIG: HCPCS | Performed by: FAMILY MEDICINE

## 2021-04-27 PROCEDURE — 99214 OFFICE O/P EST MOD 30 MIN: CPT | Performed by: FAMILY MEDICINE

## 2021-04-27 ASSESSMENT — ANXIETY QUESTIONNAIRES
GAD7 TOTAL SCORE: 5
6. BECOMING EASILY ANNOYED OR IRRITABLE: SEVERAL DAYS
1. FEELING NERVOUS, ANXIOUS, OR ON EDGE: SEVERAL DAYS
2. NOT BEING ABLE TO STOP OR CONTROL WORRYING: SEVERAL DAYS
3. WORRYING TOO MUCH ABOUT DIFFERENT THINGS: SEVERAL DAYS
5. BEING SO RESTLESS THAT IT IS HARD TO SIT STILL: NOT AT ALL
GAD7 TOTAL SCORE: 5
GAD7 TOTAL SCORE: 5
7. FEELING AFRAID AS IF SOMETHING AWFUL MIGHT HAPPEN: SEVERAL DAYS
4. TROUBLE RELAXING: NOT AT ALL
7. FEELING AFRAID AS IF SOMETHING AWFUL MIGHT HAPPEN: SEVERAL DAYS

## 2021-04-27 ASSESSMENT — PATIENT HEALTH QUESTIONNAIRE - PHQ9
SUM OF ALL RESPONSES TO PHQ QUESTIONS 1-9: 4
10. IF YOU CHECKED OFF ANY PROBLEMS, HOW DIFFICULT HAVE THESE PROBLEMS MADE IT FOR YOU TO DO YOUR WORK, TAKE CARE OF THINGS AT HOME, OR GET ALONG WITH OTHER PEOPLE: NOT DIFFICULT AT ALL
SUM OF ALL RESPONSES TO PHQ QUESTIONS 1-9: 4

## 2021-04-27 ASSESSMENT — MIFFLIN-ST. JEOR: SCORE: 1568.79

## 2021-04-27 NOTE — PROGRESS NOTES
Assessment & Plan     Type 2 diabetes mellitus without complication, without long-term current use of insulin (H) - stable control, continue diet controlled  - HEMOGLOBIN A1C  - Basic metabolic panel  (Ca, Cl, CO2, Creat, Gluc, K, Na, BUN)  - FOOT EXAM    Lightheadedness - unclear etiology, discussed concern for cardiac arrhythmia, will get cardiac monitor x 10 days. She agrees.     History of non-ST elevation myocardial infarction (NSTEMI)    Personal history of tobacco use - declined lung cancer screening for now, would consider once lightheadedness symptoms improve  - Prof Fee: Shared Decision Making Visit for Lung Cancer Screening    Obesity (BMI 35.0-39.9) with comorbidity (H)    Benign essential hypertension - in range, continue current    Hyperlipidemia with target LDL less than 100 - on statin, continue      No follow-ups on file.    Gladys Solis MD  [facility] NELDA Samuel is a 60 year old who presents for the following health issues     History of Present Illness       Mental Health Follow-up:  Patient presents to follow-up on Depression & Anxiety.Patient's depression since last visit has been:  Better  The patient is not having other symptoms associated with depression.  Patient's anxiety since last visit has been:  Better  The patient is not having other symptoms associated with anxiety.  Any significant life events: health concerns  Patient is feeling anxious or having panic attacks.  Patient has no concerns about alcohol or drug use.     Social History  Tobacco Use    Smoking status: Former Smoker      Packs/day: 0.25      Quit date: 7/3/2020      Years since quittin.8    Smokeless tobacco: Never Used  Alcohol use: Yes    Alcohol/week: 0.0 standard drinks    Comment: one time a week  Drug use: No      Today's PHQ-9         PHQ-9 Total Score:     (P) 4   PHQ-9 Q9 Thoughts of better off dead/self-harm past 2 weeks :   (P) Not at all   Thoughts of suicide  or self harm:      Self-harm Plan:        Self-harm Action:          Safety concerns for self or others:           She eats 2-3 servings of fruits and vegetables daily.She consumes 2 sweetened beverage(s) daily.She exercises with enough effort to increase her heart rate 10 to 19 minutes per day.  She exercises with enough effort to increase her heart rate 3 or less days per week. She is missing 2 dose(s) of medications per week.       Lightheadedness for the past several months.     Can occur several times per week to once weekly. Typically comes in the AM and can last into the afternoon. Seems to come at similar times during the day. Can feel a tingly sensation in her hands to alert her that she will have an episode. Has a hard time characterizing her symptoms, but reports sensation of being on a boat. Makes it hard for her to accomplish [employer] daily tasks.     Denies chest pain, palpitations, dyspnea. Every time has epigastric abdominal pressure and weakness in the arms and legs. No headache.     Not associated with eating. No hypoglycemia.     Her thyroid levels were off a bit, so we corrected this. No change in her lightheaded symptoms.       Diabetes Follow-up      How often are you checking your blood sugar? Not at all    What concerns do you have today about your diabetes? None and Other: patient complaining of dizziness and stomach issues     Do you have any of these symptoms? (Select all that apply)  Numbness in feet    Have you had a diabetic eye exam in the last 12 months? Yes-  Location: due coming up        BP Readings from Last 2 Encounters:   04/27/21 136/88   09/10/20 138/88     Hemoglobin A1C (%)   Date Value   04/27/2021 6.4 (H)   12/03/2020 6.4 (H)     LDL Cholesterol Calculated (mg/dL)   Date Value   12/03/2020 69   12/03/2019 44       Review of Systems   Constitutional, HEENT, cardiovascular, pulmonary, gi and gu systems are negative, except as otherwise noted.      Objective    /88 (BP  "Location: Right arm, Patient Position: Chair, Cuff Size: Adult Large)   Pulse 76   Temp 98.6  F (37  C) (Oral)   Resp 14   Ht 1.702 m (5' 7\")   Wt 96.6 kg (213 lb)   LMP 07/01/2013   Breastfeeding No   BMI 33.36 kg/m    Body mass index is 33.36 kg/m .  Physical Exam   GENERAL: healthy, alert and no distress  EYES: Eyes grossly normal to inspection, PERRL and conjunctivae and sclerae normal  HENT: ear canals and TM's normal, nose and mouth without ulcers or lesions  NECK: no adenopathy, no asymmetry, masses, or scars and thyroid normal to palpation  RESP: lungs clear to auscultation - no rales, rhonchi or wheezes  CV: regular rate and rhythm, normal S1 S2, no S3 or S4, no murmur, click or rub, no peripheral edema and peripheral pulses strong  ABDOMEN: soft, nontender, no hepatosplenomegaly, no masses and bowel sounds normal  MS: no gross musculoskeletal defects noted, no edema  NEURO: Normal strength and tone, mentation intact and speech normal  PSYCH: mentation appears normal, affect normal/bright  Diabetic foot exam: normal DP and PT pulses, no trophic changes or ulcerative lesions and normal sensory exam    Lab Results   Component Value Date    A1C 6.4 04/27/2021    A1C 6.4 12/03/2020    A1C 6.2 09/10/2020    A1C 6.2 12/03/2019    A1C 6.7 03/20/2019           Lung Cancer Screening Shared Decision Making Visit     Lizzie Chow is eligible for lung cancer screening on the basis of the information provided in my signed lung cancer screening order.     I have discussed with patient the risks and benefits of screening for lung cancer with low-dose CT.     The risks include:  radiation exposure: one low dose chest CT has as much ionizing radiation as about 15 chest x-rays or 6 months of background radiation living in [place]    false positives: 96% of positive findings/nodules are NOT cancer, but some might still require additional diagnostic evaluation, including biopsy  over-diagnosis: some slow growing " cancers that might never have been clinically significant will be detected and treated unnecessarily     The benefit of early detection of lung cancer is contingent upon adherence to annual screening or more frequent follow up if indicated.     Furthermore, reaping the benefits of screening requires Lizzie A Chow to be willing and physically able to undergo diagnostic procedures, if indicated. Although no specific guide is available for determining severity of comorbidities, it is reasonable to withhold screening in patients who have greater mortality risk from other diseases.

## 2021-04-27 NOTE — PATIENT INSTRUCTIONS
[facility] in [place] [phone]    Lab Results   Component Value Date    A1C 6.4 04/27/2021    A1C 6.4 12/03/2020    A1C 6.2 09/10/2020    A1C 6.2 12/03/2019    A1C 6.7 03/20/2019

## 2021-04-28 LAB
ANION GAP SERPL CALCULATED.3IONS-SCNC: 3 MMOL/L (ref 3–14)
BUN SERPL-MCNC: 17 MG/DL (ref 7–30)
CALCIUM SERPL-MCNC: 9.1 MG/DL (ref 8.5–10.1)
CHLORIDE SERPL-SCNC: 104 MMOL/L (ref 94–109)
CO2 SERPL-SCNC: 32 MMOL/L (ref 20–32)
CREAT SERPL-MCNC: 0.64 MG/DL (ref 0.52–1.04)
GFR SERPL CREATININE-BSD FRML MDRD: >90 ML/MIN/{1.73_M2}
GLUCOSE SERPL-MCNC: 92 MG/DL (ref 70–99)
POTASSIUM SERPL-SCNC: 4.1 MMOL/L (ref 3.4–5.3)
SODIUM SERPL-SCNC: 139 MMOL/L (ref 133–144)

## 2021-04-28 ASSESSMENT — ANXIETY QUESTIONNAIRES: GAD7 TOTAL SCORE: 5

## 2021-05-04 ENCOUNTER — HOSPITAL ENCOUNTER (OUTPATIENT)
Dept: CARDIOLOGY | Facility: CLINIC | Age: 61
Discharge: HOME | End: 2021-05-04
Attending: FAMILY MEDICINE | Admitting: FAMILY MEDICINE
Payer: COMMERCIAL

## 2021-05-04 DIAGNOSIS — R42 LIGHTHEADEDNESS: ICD-10-CM

## 2021-05-04 PROCEDURE — 93248 EXT ECG>7D<15D REV&INTERPJ: CPT | Performed by: INTERNAL MEDICINE

## 2021-05-04 PROCEDURE — 93246 EXT ECG>7D<15D RECORDING: CPT

## 2021-05-28 ENCOUNTER — MYC MEDICAL ADVICE (OUTPATIENT)
Dept: FAMILY MEDICINE | Facility: CLINIC | Age: 61
End: 2021-05-28

## 2021-05-28 ENCOUNTER — TELEPHONE (OUTPATIENT)
Dept: FAMILY MEDICINE | Facility: CLINIC | Age: 61
End: 2021-05-28

## 2021-05-28 DIAGNOSIS — R42 LIGHTHEADEDNESS: Primary | ICD-10-CM

## 2021-05-28 NOTE — TELEPHONE ENCOUNTER
LVM to call back PAL RN regarding following note from MD.     Please call - no concerning findings on 10 day ziopatch.   I have ordered ENT consultation to evaluate the inner ear system now. Please share contact info.     N: [facility] [phone]       Emily Mac RN

## 2021-05-28 NOTE — PROGRESS NOTES
Please call - no concerning findings on 10 day ziopatch.   I have ordered ENT consultation to evaluate the inner ear system now. Please share contact info.     NADYA

## 2021-06-25 ENCOUNTER — MYC MEDICAL ADVICE (OUTPATIENT)
Dept: FAMILY MEDICINE | Facility: CLINIC | Age: 61
End: 2021-06-25

## 2021-06-25 NOTE — LETTER
August 26, 2021      Lizzie Chow  [address] [place] [phone]        Dear Lizzie,     We have been unable to reach you by phone.      You are due for a follow up visit with Dr. Solis to discuss your diabetes.  You can make an appointment through your my chart or by calling the clinic at [phone].    As always, please let us know if you have any questions.       Sincerely,    Emily Mac RN

## 2021-07-14 NOTE — TELEPHONE ENCOUNTER
PAL Outreach     Outreach attempted via phone. This was attempt #2.      Outcome: Left message on voicemail with call back information and requested return call.    Plan:   Will follow up in 1 week.     Emily Mac RN

## 2021-07-21 NOTE — TELEPHONE ENCOUNTER
PAL Outreach     Outreach attempted via phone. This was attempt #3.      Outcome: Left message on voicemail with call back information and requested return call.    Emily Mac RN

## 2021-08-26 NOTE — TELEPHONE ENCOUNTER
PAL Outreach     Outreach attempted via letter. This was attempt #4.      Outcome: Sent letter.    Emily Mac RN

## 2021-09-04 ENCOUNTER — HEALTH MAINTENANCE LETTER (OUTPATIENT)
Age: 61
End: 2021-09-04

## 2021-09-22 NOTE — TELEPHONE ENCOUNTER
PAL Outreach     Outreach attempted via [employer]. This was attempt #5.      Outcome: Sent Stand In message.    Emily Mac RN

## 2021-10-05 DIAGNOSIS — E78.2 MIXED HYPERLIPIDEMIA: ICD-10-CM

## 2021-10-05 DIAGNOSIS — I25.118 CORONARY ARTERY DISEASE OF NATIVE HEART WITH STABLE ANGINA PECTORIS, UNSPECIFIED VESSEL OR LESION TYPE (H): ICD-10-CM

## 2021-10-06 RX ORDER — ATORVASTATIN CALCIUM 80 MG/1
80 TABLET, FILM COATED ORAL DAILY
Qty: 90 TABLET | Refills: 1 | Status: SHIPPED | OUTPATIENT
Start: 2021-10-06 | End: 2021-12-30

## 2021-10-06 RX ORDER — CARVEDILOL 6.25 MG/1
6.25 TABLET ORAL 2 TIMES DAILY
Qty: 180 TABLET | Refills: 1 | Status: SHIPPED | OUTPATIENT
Start: 2021-10-06 | End: 2021-12-30

## 2021-11-02 ENCOUNTER — MYC MEDICAL ADVICE (OUTPATIENT)
Dept: FAMILY MEDICINE | Facility: CLINIC | Age: 61
End: 2021-11-02

## 2021-11-09 ENCOUNTER — MYC MEDICAL ADVICE (OUTPATIENT)
Dept: FAMILY MEDICINE | Facility: CLINIC | Age: 61
End: 2021-11-09
Payer: COMMERCIAL

## 2021-11-09 DIAGNOSIS — Z87.891 PERSONAL HISTORY OF TOBACCO USE: Primary | ICD-10-CM

## 2021-11-09 PROCEDURE — G0296 VISIT TO DETERM LDCT ELIG: HCPCS | Performed by: FAMILY MEDICINE

## 2021-11-09 NOTE — PATIENT INSTRUCTIONS

## 2021-11-09 NOTE — TELEPHONE ENCOUNTER
Lung Cancer Screening Shared Decision Making Visit     Lizzie Chow is eligible for lung cancer screening on the basis of the information provided in my signed lung cancer screening order.     I have discussed with patient the risks and benefits of screening for lung cancer with low-dose CT.     The risks include:  radiation exposure: one low dose chest CT has as much ionizing radiation as about 15 chest x-rays or 6 months of background radiation living in [place]    false positives: 96% of positive findings/nodules are NOT cancer, but some might still require additional diagnostic evaluation, including biopsy  over-diagnosis: some slow growing cancers that might never have been clinically significant will be detected and treated unnecessarily     The benefit of early detection of lung cancer is contingent upon adherence to annual screening or more frequent follow up if indicated.     Furthermore, reaping the benefits of screening requires Lizzie Chow to be willing and physically able to undergo diagnostic procedures, if indicated. Although no specific guide is available for determining severity of comorbidities, it is reasonable to withhold screening in patients who have greater mortality risk from other diseases.     We did discuss that the only way to prevent lung cancer is to not smoke. Smoking cessation counseling was not given.      Gladys Solis MD  [facility]      
Order placed, they will contact her to schedule. NADYA  
Please advise on patients cancer screening     Dahiana Go/      
4

## 2021-11-16 RX ORDER — LISINOPRIL 2.5 MG/1
2.5 TABLET ORAL DAILY
Qty: 90 TABLET | Refills: 3 | Status: CANCELLED | OUTPATIENT
Start: 2021-11-16

## 2021-11-16 NOTE — PROGRESS NOTES
Pre-Visit Planning   11/18/21 at 3 PM with Dr. Cheney  Appointment Notes for this encounter:   I need blood work and an insurance form filled out.  I was told that any provider could do this since I can't get into see Dr. Solis until December.    Questionnaires Reviewed/Assigned  No additional questionnaires are needed  Patient preferred phone number: [phone]    Unable to reach. Left voicemail. Advised patient to call clinic back at [phone].  Emily Mac RN

## 2021-11-18 ENCOUNTER — OFFICE VISIT (OUTPATIENT)
Dept: FAMILY MEDICINE | Facility: CLINIC | Age: 61
End: 2021-11-18
Payer: COMMERCIAL

## 2021-11-18 VITALS
DIASTOLIC BLOOD PRESSURE: 82 MMHG | SYSTOLIC BLOOD PRESSURE: 138 MMHG | HEART RATE: 77 BPM | TEMPERATURE: 98.1 F | OXYGEN SATURATION: 95 % | BODY MASS INDEX: 33.43 KG/M2 | HEIGHT: 67 IN | WEIGHT: 213 LBS | RESPIRATION RATE: 18 BRPM

## 2021-11-18 DIAGNOSIS — E11.9 TYPE 2 DIABETES, HBA1C GOAL < 7% (H): ICD-10-CM

## 2021-11-18 DIAGNOSIS — Z13.220 SCREENING FOR HYPERLIPIDEMIA: Primary | ICD-10-CM

## 2021-11-18 DIAGNOSIS — I10 BENIGN ESSENTIAL HYPERTENSION: ICD-10-CM

## 2021-11-18 LAB — HBA1C MFR BLD: 6.5 % (ref 0–5.6)

## 2021-11-18 PROCEDURE — 80061 LIPID PANEL: CPT | Performed by: FAMILY MEDICINE

## 2021-11-18 PROCEDURE — 83036 HEMOGLOBIN GLYCOSYLATED A1C: CPT | Performed by: FAMILY MEDICINE

## 2021-11-18 PROCEDURE — 36415 COLL VENOUS BLD VENIPUNCTURE: CPT | Performed by: FAMILY MEDICINE

## 2021-11-18 PROCEDURE — 99213 OFFICE O/P EST LOW 20 MIN: CPT | Performed by: FAMILY MEDICINE

## 2021-11-18 PROCEDURE — 80048 BASIC METABOLIC PNL TOTAL CA: CPT | Performed by: FAMILY MEDICINE

## 2021-11-18 RX ORDER — LISINOPRIL 5 MG/1
5 TABLET ORAL DAILY
Qty: 90 TABLET | Refills: 1 | Status: SHIPPED | OUTPATIENT
Start: 2021-11-18 | End: 2021-12-30

## 2021-11-18 ASSESSMENT — MIFFLIN-ST. JEOR: SCORE: 1560.85

## 2021-11-18 NOTE — PROGRESS NOTES
"  Assessment & Plan   Required biometric form for work .  Doing well   Scheduled with PCP for physical next month .    Screening for hyperlipidemia  - Lipid panel reflex to direct LDL Fasting    Type 2 diabetes, HbA1c goal < 7% (H)  - HEMOGLOBIN A1C  - lisinopril (ZESTRIL) 5 MG tablet; Take 1 tablet (5 mg) by mouth daily    Benign essential hypertension  Well controlled on lisinopril and coreg .  - Basic metabolic panel         BMI:   Estimated body mass index is 33.86 kg/m  as calculated from the following:    Height as of this encounter: 1.689 m (5' 6.5\").    Weight as of this encounter: 96.6 kg (213 lb).   Weight management plan: Discussed healthy diet and exercise guidelines      Return in about 4 weeks (around 12/16/2021) for Routine preventive, appointment already scheduled.    Kristi Cheney MD  [facility] NELDA Samuel is a 60 year old who presents for the following health issues     History of Present Illness       She eats 2-3 servings of fruits and vegetables daily.She consumes 1 sweetened beverage(s) daily.She exercises with enough effort to increase her heart rate 9 or less minutes per day.  She exercises with enough effort to increase her heart rate 3 or less days per week. She is missing 2 dose(s) of medications per week.           Review of Systems   Constitutional: Negative for activity change and appetite change.   Respiratory: Negative for cough.    Gastrointestinal: Negative for abdominal pain.   Neurological: Negative for facial asymmetry and headaches.   Psychiatric/Behavioral: Negative for behavioral problems and confusion.            Objective    /82   Pulse 77   Temp 98.1  F (36.7  C) (Oral)   Resp 18   Ht 1.689 m (5' 6.5\")   Wt 96.6 kg (213 lb)   LMP 07/01/2013   SpO2 95%   BMI 33.86 kg/m    Body mass index is 33.86 kg/m .  Physical Exam  Pulmonary:      Effort: Pulmonary effort is normal.   Abdominal:      General: Abdomen is flat.                "

## 2021-11-19 LAB
ANION GAP SERPL CALCULATED.3IONS-SCNC: 3 MMOL/L (ref 3–14)
BUN SERPL-MCNC: 16 MG/DL (ref 7–30)
CALCIUM SERPL-MCNC: 8.8 MG/DL (ref 8.5–10.1)
CHLORIDE BLD-SCNC: 103 MMOL/L (ref 94–109)
CHOLEST SERPL-MCNC: 179 MG/DL
CO2 SERPL-SCNC: 31 MMOL/L (ref 20–32)
CREAT SERPL-MCNC: 0.54 MG/DL (ref 0.52–1.04)
FASTING STATUS PATIENT QL REPORTED: NO
GFR SERPL CREATININE-BSD FRML MDRD: >90 ML/MIN/1.73M2
GLUCOSE BLD-MCNC: 98 MG/DL (ref 70–99)
HDLC SERPL-MCNC: 56 MG/DL
LDLC SERPL CALC-MCNC: 71 MG/DL
NONHDLC SERPL-MCNC: 123 MG/DL
POTASSIUM BLD-SCNC: 4.5 MMOL/L (ref 3.4–5.3)
SODIUM SERPL-SCNC: 137 MMOL/L (ref 133–144)
TRIGL SERPL-MCNC: 258 MG/DL

## 2021-11-19 ASSESSMENT — ENCOUNTER SYMPTOMS
APPETITE CHANGE: 0
ACTIVITY CHANGE: 0
ABDOMINAL PAIN: 0
CONFUSION: 0
FACIAL ASYMMETRY: 0
COUGH: 0
HEADACHES: 0

## 2021-11-26 DIAGNOSIS — E03.9 ACQUIRED HYPOTHYROIDISM: ICD-10-CM

## 2021-11-26 RX ORDER — LEVOTHYROXINE SODIUM 50 UG/1
TABLET ORAL
Qty: 90 TABLET | Refills: 1 | Status: SHIPPED | OUTPATIENT
Start: 2021-11-26 | End: 2021-12-30

## 2021-11-26 NOTE — TELEPHONE ENCOUNTER
Routing refill request to provider for review/approval because:  TSH   Date Value Ref Range Status   12/03/2020 4.92 (H) 0.40 - 4.00 mU/L Final      Pt does have appt in 1 month     Kelsey Madrid RN

## 2021-12-02 ENCOUNTER — HOSPITAL ENCOUNTER (OUTPATIENT)
Dept: CT IMAGING | Facility: CLINIC | Age: 61
Discharge: HOME | End: 2021-12-02
Attending: FAMILY MEDICINE | Admitting: FAMILY MEDICINE
Payer: COMMERCIAL

## 2021-12-02 DIAGNOSIS — Z87.891 PERSONAL HISTORY OF TOBACCO USE: ICD-10-CM

## 2021-12-02 PROCEDURE — 71271 CT THORAX LUNG CANCER SCR C-: CPT

## 2021-12-22 ENCOUNTER — MYC MEDICAL ADVICE (OUTPATIENT)
Dept: FAMILY MEDICINE | Facility: CLINIC | Age: 61
End: 2021-12-22
Payer: COMMERCIAL

## 2021-12-22 NOTE — PROGRESS NOTES
Pre-Visit Planning   12/30/21 at 5 PM with Dr. Solis  Appointment Notes for this encounter:   OVERALL HEALTH    Questionnaires Reviewed/Assigned  No additional questionnaires are needed  Patient preferred phone number: [phone]    Unable to reach. Left voicemail. Advised patient to call clinic back at [phone]. MyChart sent as well.   Emily FRANKS RN

## 2021-12-30 ENCOUNTER — OFFICE VISIT (OUTPATIENT)
Dept: FAMILY MEDICINE | Facility: CLINIC | Age: 61
End: 2021-12-30
Payer: COMMERCIAL

## 2021-12-30 VITALS
HEIGHT: 64 IN | HEART RATE: 76 BPM | OXYGEN SATURATION: 97 % | SYSTOLIC BLOOD PRESSURE: 138 MMHG | RESPIRATION RATE: 18 BRPM | TEMPERATURE: 98.1 F | DIASTOLIC BLOOD PRESSURE: 88 MMHG | WEIGHT: 213.9 LBS | BODY MASS INDEX: 36.52 KG/M2

## 2021-12-30 DIAGNOSIS — Z00.00 ROUTINE GENERAL MEDICAL EXAMINATION AT A HEALTH CARE FACILITY: Primary | ICD-10-CM

## 2021-12-30 DIAGNOSIS — E03.9 ACQUIRED HYPOTHYROIDISM: ICD-10-CM

## 2021-12-30 DIAGNOSIS — I25.118 CORONARY ARTERY DISEASE OF NATIVE HEART WITH STABLE ANGINA PECTORIS, UNSPECIFIED VESSEL OR LESION TYPE (H): ICD-10-CM

## 2021-12-30 DIAGNOSIS — Z23 NEED FOR PROPHYLACTIC VACCINATION AND INOCULATION AGAINST INFLUENZA: ICD-10-CM

## 2021-12-30 DIAGNOSIS — E11.9 TYPE 2 DIABETES, HBA1C GOAL < 7% (H): ICD-10-CM

## 2021-12-30 DIAGNOSIS — E78.2 MIXED HYPERLIPIDEMIA: ICD-10-CM

## 2021-12-30 PROCEDURE — 84439 ASSAY OF FREE THYROXINE: CPT | Performed by: FAMILY MEDICINE

## 2021-12-30 PROCEDURE — 90471 IMMUNIZATION ADMIN: CPT | Performed by: FAMILY MEDICINE

## 2021-12-30 PROCEDURE — 90682 RIV4 VACC RECOMBINANT DNA IM: CPT | Performed by: FAMILY MEDICINE

## 2021-12-30 PROCEDURE — 99396 PREV VISIT EST AGE 40-64: CPT | Mod: 25 | Performed by: FAMILY MEDICINE

## 2021-12-30 PROCEDURE — 87624 HPV HI-RISK TYP POOLED RSLT: CPT | Performed by: FAMILY MEDICINE

## 2021-12-30 PROCEDURE — G0145 SCR C/V CYTO,THINLAYER,RESCR: HCPCS | Performed by: FAMILY MEDICINE

## 2021-12-30 PROCEDURE — 84443 ASSAY THYROID STIM HORMONE: CPT | Performed by: FAMILY MEDICINE

## 2021-12-30 PROCEDURE — 82043 UR ALBUMIN QUANTITATIVE: CPT | Performed by: FAMILY MEDICINE

## 2021-12-30 PROCEDURE — 36415 COLL VENOUS BLD VENIPUNCTURE: CPT | Performed by: FAMILY MEDICINE

## 2021-12-30 PROCEDURE — 80053 COMPREHEN METABOLIC PANEL: CPT | Performed by: FAMILY MEDICINE

## 2021-12-30 RX ORDER — CARVEDILOL 6.25 MG/1
6.25 TABLET ORAL 2 TIMES DAILY
Qty: 180 TABLET | Refills: 3 | Status: SHIPPED | OUTPATIENT
Start: 2021-12-30 | End: 2023-01-09

## 2021-12-30 RX ORDER — LEVOTHYROXINE SODIUM 50 UG/1
TABLET ORAL
Qty: 90 TABLET | Refills: 1 | Status: SHIPPED | OUTPATIENT
Start: 2021-12-30 | End: 2023-01-09

## 2021-12-30 RX ORDER — LISINOPRIL 5 MG/1
5 TABLET ORAL DAILY
Qty: 90 TABLET | Refills: 3 | Status: SHIPPED | OUTPATIENT
Start: 2021-12-30 | End: 2022-11-10 | Stop reason: DRUGHIGH

## 2021-12-30 RX ORDER — LEVOTHYROXINE SODIUM 25 UG/1
TABLET ORAL
Qty: 90 TABLET | Refills: 3 | Status: SHIPPED | OUTPATIENT
Start: 2021-12-30 | End: 2023-01-09

## 2021-12-30 RX ORDER — ATORVASTATIN CALCIUM 80 MG/1
80 TABLET, FILM COATED ORAL DAILY
Qty: 90 TABLET | Refills: 3 | Status: SHIPPED | OUTPATIENT
Start: 2021-12-30 | End: 2023-01-09

## 2021-12-30 ASSESSMENT — ENCOUNTER SYMPTOMS
WEAKNESS: 1
NAUSEA: 1
ABDOMINAL PAIN: 1
DIARRHEA: 1

## 2021-12-30 ASSESSMENT — MIFFLIN-ST. JEOR: SCORE: 1520.24

## 2021-12-30 NOTE — PROGRESS NOTES
SUBJECTIVE:   CC: Lizzie Chow is an 61 year old woman who presents for preventive health visit.     Patient has been advised of split billing requirements and indicates understanding: Yes     Healthy Habits:     Getting at least 3 servings of Calcium per day:  Yes    Bi-annual eye exam:  Yes    Dental care twice a year:  NO    Sleep apnea or symptoms of sleep apnea:  None    Diet:  Regular (no restrictions)    Frequency of exercise:  2-3 days/week    Duration of exercise:  15-30 minutes    Taking medications regularly:  Yes    Medication side effects:  Other    PHQ-2 Total Score: 0    Additional concerns today:  Yes      Today's PHQ-2 Score:   PHQ-2 (  [employer]) 2021   Q1: Little interest or pleasure in doing things 0   Q2: Feeling down, depressed or hopeless 0   PHQ-2 Score 0   PHQ-2 Total Score (12-17 Years)- Positive if 3 or more points; Administer PHQ-A if positive -   Q1: Little interest or pleasure in doing things Not at all   Q2: Feeling down, depressed or hopeless Not at all   PHQ-2 Score 0       Abuse: Current or Past (Physical, Sexual or Emotional) - No  Do you feel safe in your environment? Yes    Have you ever done Advance Care Planning? (For example, a Health Directive, POLST, or a discussion with a medical provider or your loved ones about your wishes): No, advance care planning information given to patient to review.  Patient declined advance care planning discussion at this time.    Social History     Tobacco Use     Smoking status: Former Smoker     Packs/day: 1.00     Years: 32.00     Pack years: 32.00     Quit date: 7/3/2020     Years since quittin.4     Smokeless tobacco: Never Used   Substance Use Topics     Alcohol use: Yes     Alcohol/week: 0.0 standard drinks     Comment: 6 Drinks per Week     If you drink alcohol do you typically have >3 drinks per day or >7 drinks per week? No    Alcohol Use 2021   Prescreen: >3 drinks/day or >7 drinks/week? No   Prescreen: >3  drinks/day or >7 drinks/week? -       Reviewed orders with patient.  Reviewed health maintenance and updated orders accordingly - Yes  Patient Active Problem List   Diagnosis     Former smoker     Flat feet     Family history of coronary artery disease     Fatty liver     Plantar fasciitis     Hypertension goal BP (blood pressure) < 140/90     Ischemic cardiomyopathy     Hyperlipidemia with target LDL less than 100     Type 2 diabetes, HbA1c goal < 7% (H)     Advanced directives, counseling/discussion     Benign essential hypertension     Acquired hypothyroidism     Type 2 diabetes mellitus without complication, without long-term current use of insulin (H)     Obesity (BMI 35.0-39.9) with comorbidity (H)     Coronary artery disease involving native coronary artery, angina presence unspecified, unspecified whether native or transplanted heart     Coronary artery disease of native heart with stable angina pectoris, unspecified vessel or lesion type (H)     Lightheadedness     History of non-ST elevation myocardial infarction (NSTEMI)     Past Surgical History:   Procedure Laterality Date     CARDIAC SURGERY      stent     COLONOSCOPY N/A 2016    Procedure: COLONOSCOPY;  Surgeon: Ranjith Serrano MD;  Location: [facility]     HERNIORRHAPHY VENTRAL  10/8/2013    Procedure: HERNIORRHAPHY VENTRAL;  Ventral hernia repair with mesh;  Surgeon: Alice Cordova MD;  Location: [facility]     NO HISTORY OF SURGERY         Social History     Tobacco Use     Smoking status: Former Smoker     Packs/day: 1.00     Years: 32.00     Pack years: 32.00     Quit date: 7/3/2020     Years since quittin.4     Smokeless tobacco: Never Used   Substance Use Topics     Alcohol use: Yes     Alcohol/week: 0.0 standard drinks     Comment: 6 Drinks per Week     Family History   Problem Relation Age of Onset     Lipids Mother         alive 73     C.A.D. Mother 65        at age 65, heart attack, s/p stents placement     Hypertension Mother   "    Diabetes Mother      Cardiovascular Mother      Family History Negative Father         alive 74     C.A.D. Maternal Grandmother 56        fatal MI     Obesity Maternal Grandfather      Hypertension Maternal Grandfather      Obesity Paternal Grandfather      Hypertension Paternal Grandfather      Family History Negative Sister      Family History Negative Sister      Family History Negative Brother      Family History Negative Brother         leukemia at age 18     Diabetes Sister      Obesity Brother      Breast Cancer No family hx of      Cancer - colorectal No family hx of            Breast Cancer Screening:  Any new diagnosis of family breast, ovarian, or bowel cancer? No    FHS-7: No flowsheet data found.    Mammogram Screening: Recommended mammography every 1-2 years with patient discussion and risk factor consideration  Pertinent mammograms are reviewed under the imaging tab.    History of abnormal Pap smear: NO - age 30-65 PAP every 5 years with negative HPV co-testing recommended  PAP / HPV Latest Ref Rng & Units 11/3/2016 7/9/2008   PAP (Historical) - NIL NIL   HPV16 NEG Negative -   HPV18 NEG Negative -   HRHPV NEG Negative -     Reviewed and updated as needed this visit by clinical staff  Tobacco  Allergies  Meds   Med Hx  Surg Hx  Fam Hx  Soc Hx       Reviewed and updated as needed this visit by Provider  Tobacco   Meds                Review of Systems   Gastrointestinal: Positive for abdominal pain, diarrhea and nausea.   Neurological: Positive for weakness.      ROS: 10 point ROS neg other than the symptoms noted above in the HPI.     OBJECTIVE:   /88 (BP Location: Right arm, Patient Position: Chair, Cuff Size: Adult Large)   Pulse 76   Temp 98.1  F (36.7  C) (Oral)   Resp 18   Ht 1.626 m (5' 4\")   Wt 97 kg (213 lb 14.4 oz)   LMP 07/01/2013   SpO2 97%   BMI 36.72 kg/m    Physical Exam  GENERAL APPEARANCE: healthy, alert and no distress  EYES: Eyes grossly normal to inspection, " PERRL and conjunctivae and sclerae normal  HENT: ear canals and TM's normal, nose and mouth without ulcers or lesions, oropharynx clear and oral mucous membranes moist  NECK: no adenopathy, no asymmetry, masses, or scars and thyroid normal to palpation  RESP: lungs clear to auscultation - no rales, rhonchi or wheezes  BREAST: normal without masses, tenderness or nipple discharge and no palpable axillary masses or adenopathy  CV: regular rate and rhythm, normal S1 S2, no S3 or S4, no murmur, click or rub, no peripheral edema and peripheral pulses strong  ABDOMEN: soft, nontender, no hepatosplenomegaly, no masses and bowel sounds normal   (female): normal female external genitalia, normal urethral meatus, vaginal mucosal atrophy noted, normal cervix, adnexae, and uterus without masses or abnormal discharge  MS: no musculoskeletal defects are noted and gait is age appropriate without ataxia  SKIN: no suspicious lesions or rashes  NEURO: Normal strength and tone, sensory exam grossly normal, mentation intact and speech normal  PSYCH: mentation appears normal and affect normal/bright    Diagnostic Test Results:  Lab Results   Component Value Date    A1C 6.5 11/18/2021    A1C 6.4 04/27/2021    A1C 6.4 12/03/2020    A1C 6.2 09/10/2020    A1C 6.2 12/03/2019    A1C 6.7 03/20/2019       ASSESSMENT/PLAN:     1. Routine general medical examination at a health care facility  - Pap Screen with HPV - recommended age 30 - 65 years  - Comprehensive metabolic panel (BMP + Alb, Alk Phos, ALT, AST, Total. Bili, TP); Future  - Comprehensive metabolic panel (BMP + Alb, Alk Phos, ALT, AST, Total. Bili, TP)    2. Acquired hypothyroidism - surveillance labs today  - levothyroxine (SYNTHROID/LEVOTHROID) 25 MCG tablet; Take 3 days of the week  Dispense: 90 tablet; Refill: 3  - levothyroxine (SYNTHROID/LEVOTHROID) 50 MCG tablet; Take 4 days of the week  Dispense: 90 tablet; Refill: 1  - T4, free; Future  - TSH; Future  - T4, free  -  "TSH    3. Mixed hyperlipidemia - on statin, continue, surveillance labs today  - atorvastatin (LIPITOR) 80 MG tablet; Take 1 tablet (80 mg) by mouth daily  Dispense: 90 tablet; Refill: 3    4. Type 2 diabetes, HbA1c goal < 7% (H) - stable control, continue current  - Albumin Random Urine Quantitative with Creat Ratio  - lisinopril (ZESTRIL) 5 MG tablet; Take 1 tablet (5 mg) by mouth daily  Dispense: 90 tablet; Refill: 3    5. Coronary artery disease of native heart with stable angina pectoris, unspecified vessel or lesion type (H) - on statin, ACEI and BB.   - carvedilol (COREG) 6.25 MG tablet; Take 1 tablet (6.25 mg) by mouth 2 times daily  Dispense: 180 tablet; Refill: 3      Patient has been advised of split billing requirements and indicates understanding: Yes     COUNSELING:  Reviewed preventive health counseling, as reflected in patient instructions    Estimated body mass index is 36.72 kg/m  as calculated from the following:    Height as of this encounter: 1.626 m (5' 4\").    Weight as of this encounter: 97 kg (213 lb 14.4 oz).      She reports that she quit smoking about 17 months ago. She has a 32.00 pack-year smoking history. She has never used smokeless tobacco.    Gladys Solis MD  [facility]  "

## 2021-12-31 LAB
ALBUMIN SERPL-MCNC: 3.8 G/DL (ref 3.4–5)
ALP SERPL-CCNC: 102 U/L (ref 40–150)
ALT SERPL W P-5'-P-CCNC: 48 U/L (ref 0–50)
ANION GAP SERPL CALCULATED.3IONS-SCNC: 6 MMOL/L (ref 3–14)
AST SERPL W P-5'-P-CCNC: 43 U/L (ref 0–45)
BILIRUB SERPL-MCNC: 0.5 MG/DL (ref 0.2–1.3)
BUN SERPL-MCNC: 17 MG/DL (ref 7–30)
CALCIUM SERPL-MCNC: 8.4 MG/DL (ref 8.5–10.1)
CHLORIDE BLD-SCNC: 101 MMOL/L (ref 94–109)
CO2 SERPL-SCNC: 29 MMOL/L (ref 20–32)
CREAT SERPL-MCNC: 0.66 MG/DL (ref 0.52–1.04)
CREAT UR-MCNC: 119 MG/DL
GFR SERPL CREATININE-BSD FRML MDRD: >90 ML/MIN/1.73M2
GLUCOSE BLD-MCNC: 101 MG/DL (ref 70–99)
MICROALBUMIN UR-MCNC: 21 MG/L
MICROALBUMIN/CREAT UR: 17.65 MG/G CR (ref 0–25)
POTASSIUM BLD-SCNC: 3.8 MMOL/L (ref 3.4–5.3)
PROT SERPL-MCNC: 7.7 G/DL (ref 6.8–8.8)
SODIUM SERPL-SCNC: 136 MMOL/L (ref 133–144)
T4 FREE SERPL-MCNC: 1 NG/DL (ref 0.76–1.46)
TSH SERPL DL<=0.005 MIU/L-ACNC: 3.71 MU/L (ref 0.4–4)

## 2022-01-06 PROBLEM — Z12.4 CERVICAL CANCER SCREENING: Status: ACTIVE | Noted: 2022-01-06

## 2022-02-17 PROBLEM — I25.10 CORONARY ARTERY DISEASE INVOLVING NATIVE CORONARY ARTERY: Status: ACTIVE | Noted: 2018-09-12

## 2022-04-10 ENCOUNTER — HEALTH MAINTENANCE LETTER (OUTPATIENT)
Age: 62
End: 2022-04-10

## 2022-07-31 ENCOUNTER — HEALTH MAINTENANCE LETTER (OUTPATIENT)
Age: 62
End: 2022-07-31

## 2022-08-09 ENCOUNTER — MYC MEDICAL ADVICE (OUTPATIENT)
Dept: FAMILY MEDICINE | Facility: CLINIC | Age: 62
End: 2022-08-09

## 2022-08-09 DIAGNOSIS — E11.9 TYPE 2 DIABETES, HBA1C GOAL < 7% (H): Primary | ICD-10-CM

## 2022-08-09 DIAGNOSIS — E78.5 HYPERLIPIDEMIA WITH TARGET LDL LESS THAN 100: ICD-10-CM

## 2022-08-10 NOTE — TELEPHONE ENCOUNTER
She would need an MA visit for the vitals. We will also have to be honest on the form if it asks date of last physical.     I will order labs.     She needs to schedule in September.     NADYA

## 2022-08-10 NOTE — TELEPHONE ENCOUNTER
Lizzie Mcgregor has biometric forms to be completed. Her last physical was 12/30/21. The screening has to be completed between 1/1/22- 8/31/22. Can we place lab orders or does she need to be seen to get orders, height, weight and BP?  Evon Baig,

## 2022-08-16 ENCOUNTER — ALLIED HEALTH/NURSE VISIT (OUTPATIENT)
Dept: FAMILY MEDICINE | Facility: CLINIC | Age: 62
End: 2022-08-16

## 2022-08-16 ENCOUNTER — TELEPHONE (OUTPATIENT)
Dept: FAMILY MEDICINE | Facility: CLINIC | Age: 62
End: 2022-08-16

## 2022-08-16 ENCOUNTER — LAB (OUTPATIENT)
Dept: LAB | Facility: CLINIC | Age: 62
End: 2022-08-16
Payer: COMMERCIAL

## 2022-08-16 VITALS — DIASTOLIC BLOOD PRESSURE: 104 MMHG | RESPIRATION RATE: 18 BRPM | SYSTOLIC BLOOD PRESSURE: 180 MMHG | HEART RATE: 86 BPM

## 2022-08-16 DIAGNOSIS — E78.5 HYPERLIPIDEMIA WITH TARGET LDL LESS THAN 100: ICD-10-CM

## 2022-08-16 DIAGNOSIS — E11.9 TYPE 2 DIABETES, HBA1C GOAL < 7% (H): ICD-10-CM

## 2022-08-16 DIAGNOSIS — I10 BENIGN ESSENTIAL HYPERTENSION: Primary | ICD-10-CM

## 2022-08-16 LAB
CHOLEST SERPL-MCNC: 193 MG/DL
FASTING STATUS PATIENT QL REPORTED: YES
FASTING STATUS PATIENT QL REPORTED: YES
GLUCOSE BLD-MCNC: 149 MG/DL (ref 70–99)
HDLC SERPL-MCNC: 53 MG/DL
LDLC SERPL CALC-MCNC: 111 MG/DL
NONHDLC SERPL-MCNC: 140 MG/DL
TRIGL SERPL-MCNC: 147 MG/DL

## 2022-08-16 PROCEDURE — 80061 LIPID PANEL: CPT

## 2022-08-16 PROCEDURE — 36415 COLL VENOUS BLD VENIPUNCTURE: CPT

## 2022-08-16 PROCEDURE — 82947 ASSAY GLUCOSE BLOOD QUANT: CPT

## 2022-08-16 PROCEDURE — 99207 PR NO CHARGE NURSE ONLY: CPT

## 2022-08-16 NOTE — PROGRESS NOTES
"Pt in for BP check and labs to complete biometric form for work.     She has elevated BP.   Pt admits to missing evening dose carvedilol \"more days than I take it\"    She is taking lisinopril every am    Pt denies HA, vision changes.  No chest pain or SOB.     Advised to set alarm as reminder to take carvedilol every night and will recheck BP in 1 week.  We did schedule over due med check with PCP today as well, 9/6/22.    Will route BP to PCP for review     Kelsey Madrid RN       "

## 2022-08-16 NOTE — TELEPHONE ENCOUNTER
"Pt in clinic today for BP check as she needed Biometric form for work completed by 8/31/22    BP Readings from Last 3 Encounters:   08/16/22 (!) 180/104   12/30/21 138/88   11/18/21 138/82       HR 86  R 18    She denies any symptoms from HTN.      She does admit to missing evening carvedilol \"more days than I take it\"   She does take lisinopril every am.     Writer advised to set alarm as reminder to get in the habit or taking carvedilol or take with evening meal.  Scheduled for BP recheck in 1 week after taking both medications daily.  Also scheduled over due f/u with PCP for 9/6/22.     Any further cares?    Kelsey Madrid, RN     "

## 2022-08-23 ENCOUNTER — ALLIED HEALTH/NURSE VISIT (OUTPATIENT)
Dept: FAMILY MEDICINE | Facility: CLINIC | Age: 62
End: 2022-08-23
Payer: COMMERCIAL

## 2022-08-23 VITALS — DIASTOLIC BLOOD PRESSURE: 84 MMHG | SYSTOLIC BLOOD PRESSURE: 134 MMHG

## 2022-08-23 DIAGNOSIS — I10 BENIGN ESSENTIAL HYPERTENSION: Primary | ICD-10-CM

## 2022-08-23 PROCEDURE — 99207 PR NO CHARGE NURSE ONLY: CPT

## 2022-11-10 ENCOUNTER — OFFICE VISIT (OUTPATIENT)
Dept: FAMILY MEDICINE | Facility: CLINIC | Age: 62
End: 2022-11-10
Payer: COMMERCIAL

## 2022-11-10 VITALS
SYSTOLIC BLOOD PRESSURE: 127 MMHG | HEART RATE: 76 BPM | BODY MASS INDEX: 33.59 KG/M2 | HEIGHT: 66 IN | RESPIRATION RATE: 16 BRPM | DIASTOLIC BLOOD PRESSURE: 85 MMHG | WEIGHT: 209 LBS

## 2022-11-10 DIAGNOSIS — G62.9 NEUROPATHY: Primary | ICD-10-CM

## 2022-11-10 DIAGNOSIS — R53.83 OTHER FATIGUE: ICD-10-CM

## 2022-11-10 DIAGNOSIS — I10 BENIGN ESSENTIAL HYPERTENSION: ICD-10-CM

## 2022-11-10 DIAGNOSIS — E66.01 MORBID OBESITY (H): ICD-10-CM

## 2022-11-10 DIAGNOSIS — E11.42 TYPE 2 DIABETES MELLITUS WITH DIABETIC POLYNEUROPATHY, WITHOUT LONG-TERM CURRENT USE OF INSULIN (H): ICD-10-CM

## 2022-11-10 PROBLEM — E11.9 DIABETES MELLITUS, TYPE 2 (H): Status: RESOLVED | Noted: 2022-11-10 | Resolved: 2022-11-10

## 2022-11-10 PROBLEM — E11.9 TYPE 2 DIABETES MELLITUS WITHOUT COMPLICATION, WITHOUT LONG-TERM CURRENT USE OF INSULIN (H): Status: RESOLVED | Noted: 2018-07-25 | Resolved: 2022-11-10

## 2022-11-10 PROBLEM — E11.9 DIABETES MELLITUS, TYPE 2 (H): Status: ACTIVE | Noted: 2022-11-10

## 2022-11-10 PROBLEM — I25.118 CORONARY ARTERY DISEASE OF NATIVE HEART WITH STABLE ANGINA PECTORIS, UNSPECIFIED VESSEL OR LESION TYPE (H): Status: RESOLVED | Noted: 2019-03-20 | Resolved: 2022-11-10

## 2022-11-10 LAB
ANION GAP SERPL CALCULATED.3IONS-SCNC: 7 MMOL/L (ref 3–14)
BUN SERPL-MCNC: 16 MG/DL (ref 7–30)
CALCIUM SERPL-MCNC: 9 MG/DL (ref 8.5–10.1)
CHLORIDE BLD-SCNC: 107 MMOL/L (ref 94–109)
CO2 SERPL-SCNC: 26 MMOL/L (ref 20–32)
CREAT SERPL-MCNC: 0.56 MG/DL (ref 0.52–1.04)
ERYTHROCYTE [DISTWIDTH] IN BLOOD BY AUTOMATED COUNT: 12.4 % (ref 10–15)
GFR SERPL CREATININE-BSD FRML MDRD: >90 ML/MIN/1.73M2
GLUCOSE BLD-MCNC: 163 MG/DL (ref 70–99)
HBA1C MFR BLD: 6.6 % (ref 0–5.6)
HCT VFR BLD AUTO: 41.3 % (ref 35–47)
HGB BLD-MCNC: 13.7 G/DL (ref 11.7–15.7)
MCH RBC QN AUTO: 31.4 PG (ref 26.5–33)
MCHC RBC AUTO-ENTMCNC: 33.2 G/DL (ref 31.5–36.5)
MCV RBC AUTO: 95 FL (ref 78–100)
PLATELET # BLD AUTO: 294 10E3/UL (ref 150–450)
POTASSIUM BLD-SCNC: 4.3 MMOL/L (ref 3.4–5.3)
RBC # BLD AUTO: 4.37 10E6/UL (ref 3.8–5.2)
SODIUM SERPL-SCNC: 140 MMOL/L (ref 133–144)
VIT B12 SERPL-MCNC: 370 PG/ML (ref 232–1245)
WBC # BLD AUTO: 4.7 10E3/UL (ref 4–11)

## 2022-11-10 PROCEDURE — 99214 OFFICE O/P EST MOD 30 MIN: CPT | Performed by: FAMILY MEDICINE

## 2022-11-10 PROCEDURE — 83036 HEMOGLOBIN GLYCOSYLATED A1C: CPT | Performed by: FAMILY MEDICINE

## 2022-11-10 PROCEDURE — 80048 BASIC METABOLIC PNL TOTAL CA: CPT | Performed by: FAMILY MEDICINE

## 2022-11-10 PROCEDURE — 36415 COLL VENOUS BLD VENIPUNCTURE: CPT | Performed by: FAMILY MEDICINE

## 2022-11-10 PROCEDURE — 82607 VITAMIN B-12: CPT | Performed by: FAMILY MEDICINE

## 2022-11-10 PROCEDURE — 85027 COMPLETE CBC AUTOMATED: CPT | Performed by: FAMILY MEDICINE

## 2022-11-10 RX ORDER — LISINOPRIL 10 MG/1
10 TABLET ORAL DAILY
Qty: 90 TABLET | Refills: 1 | Status: SHIPPED | OUTPATIENT
Start: 2022-11-10 | End: 2023-06-06

## 2022-11-10 RX ORDER — METFORMIN HCL 500 MG
500 TABLET, EXTENDED RELEASE 24 HR ORAL 2 TIMES DAILY WITH MEALS
Qty: 180 TABLET | Refills: 0 | Status: SHIPPED | OUTPATIENT
Start: 2022-11-10 | End: 2023-03-07

## 2022-11-10 RX ORDER — GABAPENTIN 100 MG/1
100 CAPSULE ORAL AT BEDTIME
Qty: 90 CAPSULE | Refills: 0 | Status: SHIPPED | OUTPATIENT
Start: 2022-11-10 | End: 2023-02-06

## 2022-11-10 ASSESSMENT — PATIENT HEALTH QUESTIONNAIRE - PHQ9
SUM OF ALL RESPONSES TO PHQ QUESTIONS 1-9: 3
10. IF YOU CHECKED OFF ANY PROBLEMS, HOW DIFFICULT HAVE THESE PROBLEMS MADE IT FOR YOU TO DO YOUR WORK, TAKE CARE OF THINGS AT HOME, OR GET ALONG WITH OTHER PEOPLE: NOT DIFFICULT AT ALL
SUM OF ALL RESPONSES TO PHQ QUESTIONS 1-9: 3

## 2022-11-10 ASSESSMENT — ANXIETY QUESTIONNAIRES
5. BEING SO RESTLESS THAT IT IS HARD TO SIT STILL: NOT AT ALL
3. WORRYING TOO MUCH ABOUT DIFFERENT THINGS: NOT AT ALL
GAD7 TOTAL SCORE: 1
7. FEELING AFRAID AS IF SOMETHING AWFUL MIGHT HAPPEN: SEVERAL DAYS
GAD7 TOTAL SCORE: 1
6. BECOMING EASILY ANNOYED OR IRRITABLE: NOT AT ALL
2. NOT BEING ABLE TO STOP OR CONTROL WORRYING: NOT AT ALL
IF YOU CHECKED OFF ANY PROBLEMS ON THIS QUESTIONNAIRE, HOW DIFFICULT HAVE THESE PROBLEMS MADE IT FOR YOU TO DO YOUR WORK, TAKE CARE OF THINGS AT HOME, OR GET ALONG WITH OTHER PEOPLE: NOT DIFFICULT AT ALL
4. TROUBLE RELAXING: NOT AT ALL
1. FEELING NERVOUS, ANXIOUS, OR ON EDGE: NOT AT ALL
7. FEELING AFRAID AS IF SOMETHING AWFUL MIGHT HAPPEN: SEVERAL DAYS
8. IF YOU CHECKED OFF ANY PROBLEMS, HOW DIFFICULT HAVE THESE MADE IT FOR YOU TO DO YOUR WORK, TAKE CARE OF THINGS AT HOME, OR GET ALONG WITH OTHER PEOPLE?: NOT DIFFICULT AT ALL
GAD7 TOTAL SCORE: 1

## 2022-11-10 ASSESSMENT — ENCOUNTER SYMPTOMS
NUMBNESS: 1
HEADACHES: 0
ACTIVITY CHANGE: 0
ABDOMINAL PAIN: 0
PALPITATIONS: 0
PARESTHESIAS: 1
AGITATION: 0
ABDOMINAL DISTENTION: 0
APPETITE CHANGE: 0

## 2022-11-10 NOTE — PROGRESS NOTES
{PROVIDER CHARTING PREFERENCE:443234}    Munir Samuel is a 61 year old{ACCOMPANIED BY STATEMENT (Optional):942400}, presenting for the following health issues:  No chief complaint on file.      HPI     {SUPERLIST (Optional):333533}  {additonal problems for provider to add (Optional):174624}    Review of Systems   {ROS COMP (Optional):843418}      Objective    LMP 07/01/2013   There is no height or weight on file to calculate BMI.  Physical Exam   {Exam List (Optional):623664}    {Diagnostic Test Results (Optional):788071}    {AMBULATORY ATTESTATION (Optional):748791}            Answers for HPI/ROS submitted by the patient on 11/10/2022  If you checked off any problems, how difficult have these problems made it for you to do your work, take care of things at home, or get along with other people?: Not difficult at all  PHQ9 TOTAL SCORE: 3  HUSAM 7 TOTAL SCORE: 1

## 2022-11-10 NOTE — PROGRESS NOTES
"  Assessment & Plan     Type 2 diabetes mellitus with diabetic polyneuropathy, without long-term current use of insulin (H)  a1c 6.6     Discussed diet modification   Discussed cutting down on carbs and sugar .   Discussed foot care     - HEMOGLOBIN A1C  - metFORMIN (GLUCOPHAGE XR) 500 MG 24 hr tablet; Take 1 tablet (500 mg) by mouth 2 times daily (with meals)  - gabapentin (NEURONTIN) 100 MG capsule; Take 1 capsule (100 mg) by mouth At Bedtime    Recommend to contact and follow up in 2 months , based on symptoms can consider increasing the dose of gabapentin     Neuropathy  - will obtain blood work to rule out any other symptoms .  - Basic metabolic panel  (Ca, Cl, CO2, Creat, Gluc, K, Na, BUN)  - Vitamin B12    Other fatigue  - CBC with platelets    Benign essential hypertension  - recommend low salt diet .  - lisinopril (ZESTRIL) 10 MG tablet; Take 1 tablet (10 mg) by mouth daily    Morbid obesity (H)  Discussed diet modification and different ways to loose weight .         BMI:   Estimated body mass index is 33.73 kg/m  as calculated from the following:    Height as of this encounter: 1.676 m (5' 6\").    Weight as of this encounter: 94.8 kg (209 lb).   Weight management plan: Discussed healthy diet and exercise guidelines      Return in about 8 weeks (around 1/5/2023) for Follow up.    Kristi Cheney MD  [facility] 61 year [facility]{ presenting for the following health issues:  Diabetes (Follow-up meds, diabetes) and Hypertension (Follow-up meds, BP check)      History of Present Illness       Diabetes:   She presents for follow up of diabetes.  She is checking home blood glucose a few times a month. She checks blood glucose before meals.  Blood glucose is never over 200 and never under 70. When her blood glucose is low, the patient is asymptomatic for confusion, blurred vision, lethargy and reports not feeling dizzy, shaky, or weak.  She is concerned about other. She is " "having numbness in feet. The patient has not had a diabetic eye exam in the last 12 months.         Hypertension: She presents for follow up of hypertension.  She does check blood pressure  regularly outside of the clinic. Outside blood pressures have been over 140/90. She follows a low salt diet.      Today's PHQ-9         PHQ-9 Total Score: 3    PHQ-9 Q9 Thoughts of better off dead/self-harm past 2 weeks :   Not at all    How difficult have these problems made it for you to do your work, take care of things at home, or get along with other people: Not difficult at all  Today's HUSAM-7 Score: 1           Review of Systems   Constitutional: Negative for activity change and appetite change.   HENT: Negative for congestion.    Cardiovascular: Negative for palpitations.   Gastrointestinal: Negative for abdominal distention and abdominal pain.   Neurological: Positive for numbness and paresthesias. Negative for headaches.   Psychiatric/Behavioral: Negative for agitation and behavioral problems.            Objective    /85 (BP Location: Right arm, Patient Position: Chair, Cuff Size: Adult Large)   Pulse 76   Resp 16   Ht 1.676 m (5' 6\")   Wt 94.8 kg (209 lb)   LMP 07/01/2013   BMI 33.73 kg/m    Body mass index is 33.73 kg/m .  Physical Exam   GENERAL: healthy, alert and no distress  RESP: lungs clear to auscultation - no rales, rhonchi or wheezes    CV: regular rate and rhythm, normal S1 S2, no S3 or S4, no murmur, click or rub, no peripheral edema and peripheral pulses strong  SKIN: no suspicious lesions or rashes  NEURO: Normal strength and tone, mentation intact and speech normal  PSYCH: mentation appears normal, affect normal/bright  Foot exam normal decrease sensation on the tip of the toes .              "

## 2023-01-07 DIAGNOSIS — E78.2 MIXED HYPERLIPIDEMIA: ICD-10-CM

## 2023-01-07 DIAGNOSIS — I25.118 CORONARY ARTERY DISEASE OF NATIVE HEART WITH STABLE ANGINA PECTORIS, UNSPECIFIED VESSEL OR LESION TYPE (H): ICD-10-CM

## 2023-01-07 DIAGNOSIS — E03.9 ACQUIRED HYPOTHYROIDISM: ICD-10-CM

## 2023-01-09 RX ORDER — ATORVASTATIN CALCIUM 80 MG/1
80 TABLET, FILM COATED ORAL DAILY
Qty: 90 TABLET | Refills: 3 | Status: SHIPPED | OUTPATIENT
Start: 2023-01-09 | End: 2024-01-30

## 2023-01-09 RX ORDER — LEVOTHYROXINE SODIUM 25 UG/1
TABLET ORAL
Qty: 90 TABLET | Refills: 3 | Status: SHIPPED | OUTPATIENT
Start: 2023-01-09 | End: 2024-01-18

## 2023-01-09 RX ORDER — LEVOTHYROXINE SODIUM 50 UG/1
TABLET ORAL
Qty: 90 TABLET | Refills: 1 | Status: SHIPPED | OUTPATIENT
Start: 2023-01-09 | End: 2023-11-11

## 2023-01-09 RX ORDER — CARVEDILOL 6.25 MG/1
6.25 TABLET ORAL 2 TIMES DAILY
Qty: 180 TABLET | Refills: 3 | Status: SHIPPED | OUTPATIENT
Start: 2023-01-09 | End: 2024-01-30

## 2023-01-09 NOTE — TELEPHONE ENCOUNTER
Routing refill request to provider for review/approval because:  Please clarify Levo Rx    Ed SALAS RN

## 2023-02-06 DIAGNOSIS — E11.42 TYPE 2 DIABETES MELLITUS WITH DIABETIC POLYNEUROPATHY, WITHOUT LONG-TERM CURRENT USE OF INSULIN (H): ICD-10-CM

## 2023-02-06 RX ORDER — GABAPENTIN 100 MG/1
100 CAPSULE ORAL AT BEDTIME
Qty: 90 CAPSULE | Refills: 0 | Status: SHIPPED | OUTPATIENT
Start: 2023-02-06 | End: 2024-01-30

## 2023-02-06 NOTE — TELEPHONE ENCOUNTER
Routing refill request to provider for review/approval because:  Drug not on the [facility] refill protocol     Basia PERRY RN

## 2023-02-28 NOTE — PLAN OF CARE
Problem: Patient Care Overview  Goal: Plan of Care/Patient Progress Review  PRIMARY DIAGNOSIS: CHEST PAIN  OUTPATIENT/OBSERVATION GOALS TO BE MET BEFORE DISCHARGE:  1. Ruled out acute coronary syndrome (negative or stable Troponin): Yes, neg x2  2. Pain Status: Pain free.  3. Appropriate provocative testing performed: No, planned for AM  - Stress Test Procedure: Regular ex stress  - Interpretation of cardiac rhythm per telemetry tech: SR HR 67    4. Cleared by Consultants (if applicable): N/A  5. Return to near baseline physical activity: Yes  Pt A&Ox4, VSS ex /85. Pt up ind in room. Denies chest pain, SOB, or palpitations. Plan for ex stress in AM. Will continue to monitor.  Discharge Planner Nurse   Safe discharge environment identified: Yes  Barriers to discharge: Yes       Entered by: Sridevi Clay 09/01/2018 12:15 AM     Please review provider order for any additional goals.   Nurse to notify provider when observation goals have been met and patient is ready for discharge.   no

## 2023-03-06 DIAGNOSIS — E11.42 TYPE 2 DIABETES MELLITUS WITH DIABETIC POLYNEUROPATHY, WITHOUT LONG-TERM CURRENT USE OF INSULIN (H): ICD-10-CM

## 2023-03-07 RX ORDER — METFORMIN HCL 500 MG
500 TABLET, EXTENDED RELEASE 24 HR ORAL 2 TIMES DAILY WITH MEALS
Qty: 180 TABLET | Refills: 0 | Status: SHIPPED | OUTPATIENT
Start: 2023-03-07 | End: 2024-01-30

## 2023-03-26 ENCOUNTER — HEALTH MAINTENANCE LETTER (OUTPATIENT)
Age: 63
End: 2023-03-26

## 2023-06-01 ENCOUNTER — HEALTH MAINTENANCE LETTER (OUTPATIENT)
Age: 63
End: 2023-06-01

## 2023-06-06 DIAGNOSIS — I10 BENIGN ESSENTIAL HYPERTENSION: ICD-10-CM

## 2023-06-06 RX ORDER — LISINOPRIL 10 MG/1
10 TABLET ORAL DAILY
Qty: 90 TABLET | Refills: 1 | Status: SHIPPED | OUTPATIENT
Start: 2023-06-06 | End: 2023-12-04

## 2023-06-06 NOTE — TELEPHONE ENCOUNTER
Prescription approved per [facility] Refill Protocol.  Kelsey Madrid RN

## 2023-07-18 ENCOUNTER — TELEPHONE (OUTPATIENT)
Dept: FAMILY MEDICINE | Facility: CLINIC | Age: 63
End: 2023-07-18
Payer: COMMERCIAL

## 2023-07-18 NOTE — TELEPHONE ENCOUNTER
Patient Quality Outreach    Patient is due for the following:   Physical Preventive Adult Physical    Next Steps:   Schedule a Adult Preventative    Type of outreach:    Phone, left message for patient/parent to call back.    Next Steps:  Reach out within 90 days via Phone.    Max number of attempts reached: No. Will try again in 90 days if patient still on fail list.    Questions for provider review:    None           Esteban Soria, [facility]  Chart routed to none.

## 2023-08-26 ENCOUNTER — HEALTH MAINTENANCE LETTER (OUTPATIENT)
Age: 63
End: 2023-08-26

## 2023-11-11 DIAGNOSIS — E03.9 ACQUIRED HYPOTHYROIDISM: ICD-10-CM

## 2023-11-13 RX ORDER — LEVOTHYROXINE SODIUM 50 UG/1
TABLET ORAL
Qty: 30 TABLET | Refills: 0 | Status: SHIPPED | OUTPATIENT
Start: 2023-11-13 | End: 2024-01-15

## 2023-12-04 DIAGNOSIS — I10 BENIGN ESSENTIAL HYPERTENSION: ICD-10-CM

## 2023-12-04 RX ORDER — LISINOPRIL 10 MG/1
10 TABLET ORAL DAILY
Qty: 30 TABLET | Refills: 0 | Status: SHIPPED | OUTPATIENT
Start: 2023-12-04 | End: 2024-01-18

## 2024-01-13 ENCOUNTER — HEALTH MAINTENANCE LETTER (OUTPATIENT)
Age: 64
End: 2024-01-13

## 2024-01-15 DIAGNOSIS — E03.9 ACQUIRED HYPOTHYROIDISM: ICD-10-CM

## 2024-01-15 RX ORDER — LEVOTHYROXINE SODIUM 50 UG/1
TABLET ORAL
Qty: 30 TABLET | Refills: 0 | Status: SHIPPED | OUTPATIENT
Start: 2024-01-15 | End: 2024-01-30

## 2024-01-18 ENCOUNTER — MYC REFILL (OUTPATIENT)
Dept: FAMILY MEDICINE | Facility: CLINIC | Age: 64
End: 2024-01-18
Payer: COMMERCIAL

## 2024-01-18 DIAGNOSIS — I10 BENIGN ESSENTIAL HYPERTENSION: ICD-10-CM

## 2024-01-18 DIAGNOSIS — E03.9 ACQUIRED HYPOTHYROIDISM: ICD-10-CM

## 2024-01-18 RX ORDER — LEVOTHYROXINE SODIUM 50 UG/1
TABLET ORAL
Qty: 30 TABLET | Refills: 0 | OUTPATIENT
Start: 2024-01-18

## 2024-01-18 RX ORDER — LEVOTHYROXINE SODIUM 25 UG/1
TABLET ORAL
Qty: 30 TABLET | Refills: 0 | Status: SHIPPED | OUTPATIENT
Start: 2024-01-18 | End: 2024-01-30

## 2024-01-18 RX ORDER — LISINOPRIL 10 MG/1
10 TABLET ORAL DAILY
Qty: 30 TABLET | Refills: 0 | OUTPATIENT
Start: 2024-01-18

## 2024-01-18 RX ORDER — LISINOPRIL 10 MG/1
10 TABLET ORAL DAILY
Qty: 30 TABLET | Refills: 0 | Status: SHIPPED | OUTPATIENT
Start: 2024-01-18 | End: 2024-01-30

## 2024-01-30 ENCOUNTER — OFFICE VISIT (OUTPATIENT)
Dept: FAMILY MEDICINE | Facility: CLINIC | Age: 64
End: 2024-01-30
Payer: COMMERCIAL

## 2024-01-30 VITALS
BODY MASS INDEX: 34.23 KG/M2 | RESPIRATION RATE: 14 BRPM | DIASTOLIC BLOOD PRESSURE: 95 MMHG | HEART RATE: 65 BPM | TEMPERATURE: 97.8 F | SYSTOLIC BLOOD PRESSURE: 180 MMHG | HEIGHT: 66 IN | WEIGHT: 213 LBS

## 2024-01-30 DIAGNOSIS — I25.118 CORONARY ARTERY DISEASE OF NATIVE HEART WITH STABLE ANGINA PECTORIS, UNSPECIFIED VESSEL OR LESION TYPE (H): ICD-10-CM

## 2024-01-30 DIAGNOSIS — E78.2 MIXED HYPERLIPIDEMIA: ICD-10-CM

## 2024-01-30 DIAGNOSIS — I10 BENIGN ESSENTIAL HYPERTENSION: Primary | ICD-10-CM

## 2024-01-30 DIAGNOSIS — R14.0 BLOATING: ICD-10-CM

## 2024-01-30 DIAGNOSIS — E78.5 HYPERLIPIDEMIA WITH TARGET LDL LESS THAN 100: ICD-10-CM

## 2024-01-30 DIAGNOSIS — E11.42 TYPE 2 DIABETES MELLITUS WITH DIABETIC POLYNEUROPATHY, WITHOUT LONG-TERM CURRENT USE OF INSULIN (H): ICD-10-CM

## 2024-01-30 DIAGNOSIS — Z12.31 VISIT FOR SCREENING MAMMOGRAM: ICD-10-CM

## 2024-01-30 DIAGNOSIS — E03.9 ACQUIRED HYPOTHYROIDISM: ICD-10-CM

## 2024-01-30 DIAGNOSIS — E66.01 MORBID OBESITY (H): ICD-10-CM

## 2024-01-30 LAB — HBA1C MFR BLD: 6.3 % (ref 0–5.6)

## 2024-01-30 PROCEDURE — 84439 ASSAY OF FREE THYROXINE: CPT | Performed by: FAMILY MEDICINE

## 2024-01-30 PROCEDURE — 82570 ASSAY OF URINE CREATININE: CPT | Performed by: FAMILY MEDICINE

## 2024-01-30 PROCEDURE — 36415 COLL VENOUS BLD VENIPUNCTURE: CPT | Performed by: FAMILY MEDICINE

## 2024-01-30 PROCEDURE — 99214 OFFICE O/P EST MOD 30 MIN: CPT | Performed by: FAMILY MEDICINE

## 2024-01-30 PROCEDURE — 83036 HEMOGLOBIN GLYCOSYLATED A1C: CPT | Performed by: FAMILY MEDICINE

## 2024-01-30 PROCEDURE — 82043 UR ALBUMIN QUANTITATIVE: CPT | Performed by: FAMILY MEDICINE

## 2024-01-30 PROCEDURE — 84443 ASSAY THYROID STIM HORMONE: CPT | Performed by: FAMILY MEDICINE

## 2024-01-30 PROCEDURE — 80053 COMPREHEN METABOLIC PANEL: CPT | Performed by: FAMILY MEDICINE

## 2024-01-30 PROCEDURE — 80061 LIPID PANEL: CPT | Performed by: FAMILY MEDICINE

## 2024-01-30 RX ORDER — GABAPENTIN 100 MG/1
100 CAPSULE ORAL AT BEDTIME
Qty: 90 CAPSULE | Refills: 3 | Status: SHIPPED | OUTPATIENT
Start: 2024-01-30 | End: 2024-08-30

## 2024-01-30 RX ORDER — OMEPRAZOLE 40 MG/1
40 CAPSULE, DELAYED RELEASE ORAL DAILY
Qty: 30 CAPSULE | Refills: 0 | Status: SHIPPED | OUTPATIENT
Start: 2024-01-30 | End: 2024-08-30

## 2024-01-30 RX ORDER — LISINOPRIL 20 MG/1
20 TABLET ORAL DAILY
Qty: 90 TABLET | Refills: 1 | Status: SHIPPED | OUTPATIENT
Start: 2024-01-30 | End: 2024-08-05

## 2024-01-30 RX ORDER — ATORVASTATIN CALCIUM 80 MG/1
80 TABLET, FILM COATED ORAL DAILY
Qty: 90 TABLET | Refills: 3 | Status: SHIPPED | OUTPATIENT
Start: 2024-01-30 | End: 2024-08-30

## 2024-01-30 RX ORDER — CARVEDILOL 6.25 MG/1
6.25 TABLET ORAL 2 TIMES DAILY
Qty: 180 TABLET | Refills: 3 | Status: SHIPPED | OUTPATIENT
Start: 2024-01-30 | End: 2024-08-30

## 2024-01-30 RX ORDER — LEVOTHYROXINE SODIUM 25 UG/1
TABLET ORAL
Qty: 90 TABLET | Refills: 3 | Status: SHIPPED | OUTPATIENT
Start: 2024-01-30

## 2024-01-30 RX ORDER — METFORMIN HCL 500 MG
500 TABLET, EXTENDED RELEASE 24 HR ORAL 2 TIMES DAILY WITH MEALS
Qty: 180 TABLET | Refills: 3 | Status: SHIPPED | OUTPATIENT
Start: 2024-01-30 | End: 2024-08-30

## 2024-01-30 RX ORDER — LEVOTHYROXINE SODIUM 50 UG/1
TABLET ORAL
Qty: 90 TABLET | Refills: 3 | Status: SHIPPED | OUTPATIENT
Start: 2024-01-30

## 2024-01-30 ASSESSMENT — ANXIETY QUESTIONNAIRES
5. BEING SO RESTLESS THAT IT IS HARD TO SIT STILL: NOT AT ALL
GAD7 TOTAL SCORE: 6
1. FEELING NERVOUS, ANXIOUS, OR ON EDGE: SEVERAL DAYS
GAD7 TOTAL SCORE: 6
8. IF YOU CHECKED OFF ANY PROBLEMS, HOW DIFFICULT HAVE THESE MADE IT FOR YOU TO DO YOUR WORK, TAKE CARE OF THINGS AT HOME, OR GET ALONG WITH OTHER PEOPLE?: SOMEWHAT DIFFICULT
4. TROUBLE RELAXING: SEVERAL DAYS
7. FEELING AFRAID AS IF SOMETHING AWFUL MIGHT HAPPEN: SEVERAL DAYS
7. FEELING AFRAID AS IF SOMETHING AWFUL MIGHT HAPPEN: SEVERAL DAYS
IF YOU CHECKED OFF ANY PROBLEMS ON THIS QUESTIONNAIRE, HOW DIFFICULT HAVE THESE PROBLEMS MADE IT FOR YOU TO DO YOUR WORK, TAKE CARE OF THINGS AT HOME, OR GET ALONG WITH OTHER PEOPLE: SOMEWHAT DIFFICULT
3. WORRYING TOO MUCH ABOUT DIFFERENT THINGS: SEVERAL DAYS
GAD7 TOTAL SCORE: 6
6. BECOMING EASILY ANNOYED OR IRRITABLE: SEVERAL DAYS
2. NOT BEING ABLE TO STOP OR CONTROL WORRYING: SEVERAL DAYS

## 2024-01-30 ASSESSMENT — PATIENT HEALTH QUESTIONNAIRE - PHQ9
10. IF YOU CHECKED OFF ANY PROBLEMS, HOW DIFFICULT HAVE THESE PROBLEMS MADE IT FOR YOU TO DO YOUR WORK, TAKE CARE OF THINGS AT HOME, OR GET ALONG WITH OTHER PEOPLE: NOT DIFFICULT AT ALL
SUM OF ALL RESPONSES TO PHQ QUESTIONS 1-9: 10
SUM OF ALL RESPONSES TO PHQ QUESTIONS 1-9: 10

## 2024-01-30 ASSESSMENT — ENCOUNTER SYMPTOMS: ABDOMINAL PAIN: 1

## 2024-01-30 NOTE — PROGRESS NOTES
Assessment & Plan     Benign essential hypertension - uncontrolled, she will check BP at home tonight and tomorrow. Increased lisinopril to 20 mg daily. She leaves for [place] in 2 days. Will likely need follow up after she gets back.   - Comprehensive metabolic panel (BMP + Alb, Alk Phos, ALT, AST, Total. Bili, TP); Future  - Comprehensive metabolic panel (BMP + Alb, Alk Phos, ALT, AST, Total. Bili, TP)  - lisinopril (ZESTRIL) 20 MG tablet; Take 1 tablet (20 mg) by mouth daily    Acquired hypothyroidism - surveillance labs today  - TSH; Future  - T4, free; Future  - TSH  - T4, free  - levothyroxine (SYNTHROID/LEVOTHROID) 25 MCG tablet; Take 3 days of the week  - levothyroxine (SYNTHROID/LEVOTHROID) 50 MCG tablet; Take 4 days of the week    Hyperlipidemia with target LDL less than 100 - on statin. continue  - Lipid panel reflex to direct LDL Non-fasting; Future  - Comprehensive metabolic panel (BMP + Alb, Alk Phos, ALT, AST, Total. Bili, TP); Future  - Lipid panel reflex to direct LDL Non-fasting  - Comprehensive metabolic panel (BMP + Alb, Alk Phos, ALT, AST, Total. Bili, TP)    Type 2 diabetes mellitus with diabetic polyneuropathy, without long-term current use of insulin (H) - well controlled, continue current  - Hemoglobin A1c; Future  - Albumin Random Urine Quantitative with Creat Ratio; Future  - Hemoglobin A1c  - Albumin Random Urine Quantitative with Creat Ratio  - gabapentin (NEURONTIN) 100 MG capsule; Take 1 capsule (100 mg) by mouth at bedtime  - metFORMIN (GLUCOPHAGE XR) 500 MG 24 hr tablet; Take 1 tablet (500 mg) by mouth 2 times daily (with meals)    Bloating - suggested 1 month trial of PPI, gastro consult to investigate SIBO.   - Adult GI  Referral - Consult Only; Future  - omeprazole (PRILOSEC) 40 MG DR capsule; Take 1 capsule (40 mg) by mouth daily    Morbid obesity (H) - weight stable, will continue to monitor.     Mixed hyperlipidemia   - atorvastatin (LIPITOR) 80 MG tablet; Take 1 tablet  (80 mg) by mouth daily    Coronary artery disease of native heart with stable angina pectoris, unspecified vessel or lesion type (H24)  - carvedilol (COREG) 6.25 MG tablet; Take 1 tablet (6.25 mg) by mouth 2 times daily    Visit for screening mammogram  - MA Screen Bilateral w/Tj; Future        Subjective   Lizzie is a 63 year old, presenting for the following health issues:  Abdominal Pain and Diabetes        1/30/2024     4:55 PM   Additional Questions   Roomed by Esteban Soria   Accompanied by self     History of Present Illness       Mental Health Follow-up:  Patient presents to follow-up on Depression & Anxiety.Patient's depression since last visit has been:  Medium  The patient is not having other symptoms associated with depression.  Patient's anxiety since last visit has been:  Medium  The patient is not having other symptoms associated with anxiety.  Any significant life events: grief or loss and health concerns  Patient is not feeling anxious or having panic attacks.  Patient has no concerns about alcohol or drug use.    Diabetes:   She presents for follow up of diabetes.  She is checking home blood glucose a few times a month.   She checks blood glucose before meals.  Blood glucose is never over 200 and never under 70. She is aware of hypoglycemia symptoms including dizziness.    She has no concerns regarding her diabetes at this time.  She is having numbness in feet.  The patient has not had a diabetic eye exam in the last 12 months.          She eats 2-3 servings of fruits and vegetables daily.She consumes 3 sweetened beverage(s) daily.She exercises with enough effort to increase her heart rate 9 or less minutes per day.  She exercises with enough effort to increase her heart rate 3 or less days per week. She is missing 2 dose(s) of medications per week.  She is not taking prescribed medications regularly due to remembering to take.      Reports increased abdominal bloating for over a year.   Every day,  "gets worse as the day goes on.   Looser stools at times, brown, no blood or mucous.       Answers submitted by the patient for this visit:  Patient Health Questionnaire (Submitted on 1/30/2024)  If you checked off any problems, how difficult have these problems made it for you to do your work, take care of things at home, or get along with other people?: Not difficult at all  PHQ9 TOTAL SCORE: 10         Review of Systems  Constitutional, neuro, ENT, endocrine, pulmonary, cardiac, gastrointestinal, genitourinary, musculoskeletal, integument and psychiatric systems are negative, except as otherwise noted.      Objective    BP (!) 180/95 (BP Location: Right arm, Patient Position: Chair, Cuff Size: Adult Regular)   Pulse 65   Temp 97.8  F (36.6  C) (Oral)   Resp 14   Ht 1.676 m (5' 6\")   Wt 96.6 kg (213 lb)   LMP 07/01/2013   Breastfeeding No   BMI 34.38 kg/m    Body mass index is 34.38 kg/m .  Physical Exam   GENERAL: alert and no distress  PSYCH: mentation appears normal, affect normal/bright    Lab Results   Component Value Date    A1C 6.3 01/30/2024    A1C 6.6 11/10/2022    A1C 6.5 11/18/2021    A1C 6.4 04/27/2021    A1C 6.4 12/03/2020    A1C 6.2 09/10/2020    A1C 6.2 12/03/2019    A1C 6.7 03/20/2019             Signed Electronically by: Gladys Solis MD    "

## 2024-01-30 NOTE — PATIENT INSTRUCTIONS
Lab Results   Component Value Date    A1C 6.3 01/30/2024    A1C 6.6 11/10/2022    A1C 6.5 11/18/2021    A1C 6.4 04/27/2021    A1C 6.4 12/03/2020    A1C 6.2 09/10/2020    A1C 6.2 12/03/2019    A1C 6.7 03/20/2019

## 2024-01-31 LAB
CREAT UR-MCNC: 15.5 MG/DL
MICROALBUMIN UR-MCNC: <12 MG/L
MICROALBUMIN/CREAT UR: NORMAL MG/G{CREAT}

## 2024-02-01 LAB
ALBUMIN SERPL BCG-MCNC: 4.3 G/DL (ref 3.5–5.2)
ALP SERPL-CCNC: 87 U/L (ref 40–150)
ALT SERPL W P-5'-P-CCNC: 39 U/L (ref 0–50)
ANION GAP SERPL CALCULATED.3IONS-SCNC: 11 MMOL/L (ref 7–15)
AST SERPL W P-5'-P-CCNC: 42 U/L (ref 0–45)
BILIRUB SERPL-MCNC: 0.7 MG/DL
BUN SERPL-MCNC: 11.1 MG/DL (ref 8–23)
CALCIUM SERPL-MCNC: 9 MG/DL (ref 8.8–10.2)
CHLORIDE SERPL-SCNC: 96 MMOL/L (ref 98–107)
CHOLEST SERPL-MCNC: 142 MG/DL
CREAT SERPL-MCNC: 0.54 MG/DL (ref 0.51–0.95)
DEPRECATED HCO3 PLAS-SCNC: 29 MMOL/L (ref 22–29)
EGFRCR SERPLBLD CKD-EPI 2021: >90 ML/MIN/1.73M2
FASTING STATUS PATIENT QL REPORTED: YES
GLUCOSE SERPL-MCNC: 110 MG/DL (ref 70–99)
HDLC SERPL-MCNC: 57 MG/DL
LDLC SERPL CALC-MCNC: 49 MG/DL
NONHDLC SERPL-MCNC: 85 MG/DL
POTASSIUM SERPL-SCNC: 3.8 MMOL/L (ref 3.4–5.3)
PROT SERPL-MCNC: 7.3 G/DL (ref 6.4–8.3)
SODIUM SERPL-SCNC: 136 MMOL/L (ref 135–145)
T4 FREE SERPL-MCNC: 1.2 NG/DL (ref 0.9–1.7)
TRIGL SERPL-MCNC: 178 MG/DL
TSH SERPL DL<=0.005 MIU/L-ACNC: 5.94 UIU/ML (ref 0.3–4.2)

## 2024-03-01 ENCOUNTER — TELEPHONE (OUTPATIENT)
Dept: FAMILY MEDICINE | Facility: CLINIC | Age: 64
End: 2024-03-01
Payer: COMMERCIAL

## 2024-03-01 NOTE — TELEPHONE ENCOUNTER
Patient Quality Outreach    Patient is due for the following:   Breast Cancer Screening - Mammogram    Next Steps:   None at this time. Patient has mammogram on the schedule tomorrow    Type of outreach:    none    Next Steps:  Reach out within 90 days via  none .    Max number of attempts reached: Yes. Will try again in 90 days if patient still on fail list.    Questions for provider review:    None           Esteban Soria, [facility]  Chart routed to none.

## 2024-04-22 ENCOUNTER — TRANSFERRED RECORDS (OUTPATIENT)
Dept: HEALTH INFORMATION MANAGEMENT | Facility: CLINIC | Age: 64
End: 2024-04-22
Payer: COMMERCIAL

## 2024-04-22 LAB — RETINOPATHY: NEGATIVE

## 2024-05-10 ENCOUNTER — TRANSFERRED RECORDS (OUTPATIENT)
Dept: HEALTH INFORMATION MANAGEMENT | Facility: CLINIC | Age: 64
End: 2024-05-10
Payer: COMMERCIAL

## 2024-06-01 ENCOUNTER — HEALTH MAINTENANCE LETTER (OUTPATIENT)
Age: 64
End: 2024-06-01

## 2024-08-23 ENCOUNTER — MYC MEDICAL ADVICE (OUTPATIENT)
Dept: FAMILY MEDICINE | Facility: CLINIC | Age: 64
End: 2024-08-23
Payer: COMMERCIAL

## 2024-08-30 ENCOUNTER — VIRTUAL VISIT (OUTPATIENT)
Dept: FAMILY MEDICINE | Facility: CLINIC | Age: 64
End: 2024-08-30
Payer: COMMERCIAL

## 2024-08-30 DIAGNOSIS — E11.42 TYPE 2 DIABETES MELLITUS WITH DIABETIC POLYNEUROPATHY, WITHOUT LONG-TERM CURRENT USE OF INSULIN (H): Primary | ICD-10-CM

## 2024-08-30 DIAGNOSIS — I10 BENIGN ESSENTIAL HYPERTENSION: ICD-10-CM

## 2024-08-30 DIAGNOSIS — R14.0 BLOATING: ICD-10-CM

## 2024-08-30 DIAGNOSIS — R20.0 NUMBNESS OF TOES: ICD-10-CM

## 2024-08-30 DIAGNOSIS — E78.2 MIXED HYPERLIPIDEMIA: ICD-10-CM

## 2024-08-30 DIAGNOSIS — E78.5 HYPERLIPIDEMIA WITH TARGET LDL LESS THAN 100: ICD-10-CM

## 2024-08-30 DIAGNOSIS — I25.118 CORONARY ARTERY DISEASE OF NATIVE HEART WITH STABLE ANGINA PECTORIS, UNSPECIFIED VESSEL OR LESION TYPE (H): ICD-10-CM

## 2024-08-30 DIAGNOSIS — E03.9 ACQUIRED HYPOTHYROIDISM: ICD-10-CM

## 2024-08-30 PROCEDURE — 99214 OFFICE O/P EST MOD 30 MIN: CPT | Mod: 95 | Performed by: FAMILY MEDICINE

## 2024-08-30 RX ORDER — LISINOPRIL 20 MG/1
20 TABLET ORAL DAILY
Qty: 90 TABLET | Refills: 3 | Status: SHIPPED | OUTPATIENT
Start: 2024-08-30

## 2024-08-30 RX ORDER — ATORVASTATIN CALCIUM 80 MG/1
80 TABLET, FILM COATED ORAL DAILY
Qty: 90 TABLET | Refills: 3 | Status: SHIPPED | OUTPATIENT
Start: 2024-08-30

## 2024-08-30 RX ORDER — OMEPRAZOLE 40 MG/1
40 CAPSULE, DELAYED RELEASE ORAL DAILY
Qty: 90 CAPSULE | Refills: 3 | Status: SHIPPED | OUTPATIENT
Start: 2024-08-30

## 2024-08-30 RX ORDER — LANCETS
EACH MISCELLANEOUS
Qty: 100 EACH | Refills: 6 | Status: SHIPPED | OUTPATIENT
Start: 2024-08-30

## 2024-08-30 RX ORDER — CARVEDILOL 6.25 MG/1
6.25 TABLET ORAL 2 TIMES DAILY
Qty: 180 TABLET | Refills: 3 | Status: SHIPPED | OUTPATIENT
Start: 2024-08-30

## 2024-08-30 ASSESSMENT — PATIENT HEALTH QUESTIONNAIRE - PHQ9
10. IF YOU CHECKED OFF ANY PROBLEMS, HOW DIFFICULT HAVE THESE PROBLEMS MADE IT FOR YOU TO DO YOUR WORK, TAKE CARE OF THINGS AT HOME, OR GET ALONG WITH OTHER PEOPLE: NOT DIFFICULT AT ALL
SUM OF ALL RESPONSES TO PHQ QUESTIONS 1-9: 3
SUM OF ALL RESPONSES TO PHQ QUESTIONS 1-9: 3

## 2024-08-30 ASSESSMENT — ANXIETY QUESTIONNAIRES
6. BECOMING EASILY ANNOYED OR IRRITABLE: SEVERAL DAYS
GAD7 TOTAL SCORE: 2
IF YOU CHECKED OFF ANY PROBLEMS ON THIS QUESTIONNAIRE, HOW DIFFICULT HAVE THESE PROBLEMS MADE IT FOR YOU TO DO YOUR WORK, TAKE CARE OF THINGS AT HOME, OR GET ALONG WITH OTHER PEOPLE: NOT DIFFICULT AT ALL
3. WORRYING TOO MUCH ABOUT DIFFERENT THINGS: NOT AT ALL
5. BEING SO RESTLESS THAT IT IS HARD TO SIT STILL: NOT AT ALL
2. NOT BEING ABLE TO STOP OR CONTROL WORRYING: NOT AT ALL
1. FEELING NERVOUS, ANXIOUS, OR ON EDGE: SEVERAL DAYS
4. TROUBLE RELAXING: NOT AT ALL
GAD7 TOTAL SCORE: 2
8. IF YOU CHECKED OFF ANY PROBLEMS, HOW DIFFICULT HAVE THESE MADE IT FOR YOU TO DO YOUR WORK, TAKE CARE OF THINGS AT HOME, OR GET ALONG WITH OTHER PEOPLE?: NOT DIFFICULT AT ALL
7. FEELING AFRAID AS IF SOMETHING AWFUL MIGHT HAPPEN: NOT AT ALL
7. FEELING AFRAID AS IF SOMETHING AWFUL MIGHT HAPPEN: NOT AT ALL
GAD7 TOTAL SCORE: 2

## 2024-08-30 NOTE — PROGRESS NOTES
Lizzie is a 63 year old who is being evaluated via a billable video visit.    How would you like to obtain your AVS? MyChart  If the video visit is dropped, the invitation should be resent by: Text to cell phone: [phone]  Will anyone else be joining your video visit? No      Assessment & Plan     Type 2 diabetes mellitus with diabetic polyneuropathy, without long-term current use of insulin (H) - surveillance labs near future  - blood glucose monitoring (NO BRAND SPECIFIED) meter device kit; Use to test blood sugar 2 times daily or as directed. Preferred blood glucose meter OR supplies to accompany: Blood Glucose Monitor Brands: per insurance.  - blood glucose (NO BRAND SPECIFIED) test strip; Use to test blood sugar 2 times daily or as directed. To accompany: Blood Glucose Monitor Brands: per insurance.  - thin (NO BRAND SPECIFIED) lancets; Use with lanceting device. To accompany: Blood Glucose Monitor Brands: per insurance.  - Hemoglobin A1c; Future    Acquired hypothyroidism - surveillance labs near future, last TSH was high  - TSH; Future  - T4, free; Future    Benign essential hypertension - home BP are in range, continue current  - Comprehensive metabolic panel (BMP + Alb, Alk Phos, ALT, AST, Total. Bili, TP); Future  - lisinopril (ZESTRIL) 20 MG tablet; Take 1 tablet (20 mg) by mouth daily.    Hyperlipidemia with target LDL less than 100 - on statin, continue  - Comprehensive metabolic panel (BMP + Alb, Alk Phos, ALT, AST, Total. Bili, TP); Future  - Lipid panel reflex to direct LDL Fasting; Future    Mixed hyperlipidemia  - atorvastatin (LIPITOR) 80 MG tablet; Take 1 tablet (80 mg) by mouth daily.    Coronary artery disease of native heart with stable angina pectoris, unspecified vessel or lesion type (H24)  - carvedilol (COREG) 6.25 MG tablet; Take 1 tablet (6.25 mg) by mouth 2 times daily.    Bloating  - omeprazole (PRILOSEC) 40 MG DR capsule; Take 1 capsule (40 mg) by mouth daily.    8. Numbness of toes  "- past several months, toes and bottoms of feet. No color changes, no progression. No low back pain. Unable to evaluate due to nature of virtual visit. Suggested Neurology consult to better evaluate.   - Adult Neurology  Referral; Future      BMI  Estimated body mass index is 34.38 kg/m  as calculated from the following:    Height as of 1/30/24: 1.676 m (5' 6\").    Weight as of 1/30/24: 96.6 kg (213 lb).         Munir Samuel is a 63 year old, presenting for the following health issues:  Diabetes (Follow-up meds, diabetes)        8/30/2024     1:30 PM   Additional Questions   Roomed by Tiffany Cabrales     History of Present Illness       She eats 2-3 servings of fruits and vegetables daily.She consumes 3 sweetened beverage(s) daily.She exercises with enough effort to increase her heart rate 30 to 60 minutes per day.  She exercises with enough effort to increase her heart rate 4 days per week. She is missing 1 dose(s) of medications per week.  She is not taking prescribed medications regularly due to remembering to take.           Review of Systems  Constitutional, neuro, ENT, endocrine, pulmonary, cardiac, gastrointestinal, genitourinary, musculoskeletal, integument and psychiatric systems are negative, except as otherwise noted.      Objective    Vitals - Patient Reported  Systolic (Patient Reported): 123  Diastolic (Patient Reported): 84  Weight (Patient Reported): 96.6 kg (213 lb)  Height (Patient Reported): 167.6 cm (5' 6\")  BMI (Based on Pt Reported Ht/Wt): 34.38      Vitals:  No vitals were obtained today due to virtual visit.    Physical Exam   GENERAL: alert and no distress  EYES: Eyes grossly normal to inspection.  No discharge or erythema, or obvious scleral/conjunctival abnormalities.  RESP: No audible wheeze, cough, or visible cyanosis.    SKIN: Visible skin clear. No significant rash, abnormal pigmentation or lesions.  NEURO: Cranial nerves grossly intact.  Mentation and speech appropriate for " age.  PSYCH: Appropriate affect, tone, and pace of words          Video-Visit Details    Type of service:  Video Visit   Originating Location (pt. Location): Home    Distant Location (provider location):  Off-site  Platform used for Video Visit: [facility]  Signed Electronically by: Gladys Solis MD

## 2024-08-30 NOTE — NURSING NOTE
Attempted to contact patient to schedule a lab only visit and a 6 month diabetes follow-up, left detailed message    Tiffany Cabrales/[facility]---[place]

## 2024-09-26 ENCOUNTER — LAB (OUTPATIENT)
Dept: LAB | Facility: CLINIC | Age: 64
End: 2024-09-26
Payer: COMMERCIAL

## 2024-09-26 DIAGNOSIS — I10 BENIGN ESSENTIAL HYPERTENSION: ICD-10-CM

## 2024-09-26 DIAGNOSIS — E03.9 ACQUIRED HYPOTHYROIDISM: ICD-10-CM

## 2024-09-26 DIAGNOSIS — E11.42 TYPE 2 DIABETES MELLITUS WITH DIABETIC POLYNEUROPATHY, WITHOUT LONG-TERM CURRENT USE OF INSULIN (H): ICD-10-CM

## 2024-09-26 DIAGNOSIS — E78.5 HYPERLIPIDEMIA WITH TARGET LDL LESS THAN 100: ICD-10-CM

## 2024-09-26 LAB
ALBUMIN SERPL BCG-MCNC: 3.9 G/DL (ref 3.5–5.2)
ALP SERPL-CCNC: 97 U/L (ref 40–150)
ALT SERPL W P-5'-P-CCNC: 26 U/L (ref 0–50)
ANION GAP SERPL CALCULATED.3IONS-SCNC: 11 MMOL/L (ref 7–15)
AST SERPL W P-5'-P-CCNC: 40 U/L (ref 0–45)
BILIRUB SERPL-MCNC: 0.4 MG/DL
BUN SERPL-MCNC: 15.3 MG/DL (ref 8–23)
CALCIUM SERPL-MCNC: 8.5 MG/DL (ref 8.8–10.4)
CHLORIDE SERPL-SCNC: 100 MMOL/L (ref 98–107)
CHOLEST SERPL-MCNC: 140 MG/DL
CREAT SERPL-MCNC: 0.71 MG/DL (ref 0.51–0.95)
EGFRCR SERPLBLD CKD-EPI 2021: >90 ML/MIN/1.73M2
EST. AVERAGE GLUCOSE BLD GHB EST-MCNC: 137 MG/DL
FASTING STATUS PATIENT QL REPORTED: YES
FASTING STATUS PATIENT QL REPORTED: YES
GLUCOSE SERPL-MCNC: 146 MG/DL (ref 70–99)
HBA1C MFR BLD: 6.4 % (ref 0–5.6)
HCO3 SERPL-SCNC: 26 MMOL/L (ref 22–29)
HDLC SERPL-MCNC: 50 MG/DL
LDLC SERPL CALC-MCNC: 67 MG/DL
NONHDLC SERPL-MCNC: 90 MG/DL
POTASSIUM SERPL-SCNC: 4.5 MMOL/L (ref 3.4–5.3)
PROT SERPL-MCNC: 6.7 G/DL (ref 6.4–8.3)
SODIUM SERPL-SCNC: 137 MMOL/L (ref 135–145)
T4 FREE SERPL-MCNC: 1.09 NG/DL (ref 0.9–1.7)
TRIGL SERPL-MCNC: 116 MG/DL
TSH SERPL DL<=0.005 MIU/L-ACNC: 3.7 UIU/ML (ref 0.3–4.2)

## 2024-09-26 PROCEDURE — 80061 LIPID PANEL: CPT

## 2024-09-26 PROCEDURE — 36415 COLL VENOUS BLD VENIPUNCTURE: CPT

## 2024-09-26 PROCEDURE — 83036 HEMOGLOBIN GLYCOSYLATED A1C: CPT

## 2024-09-26 PROCEDURE — 84443 ASSAY THYROID STIM HORMONE: CPT

## 2024-09-26 PROCEDURE — 84439 ASSAY OF FREE THYROXINE: CPT

## 2024-09-26 PROCEDURE — 80053 COMPREHEN METABOLIC PANEL: CPT

## 2024-10-17 ENCOUNTER — MYC MEDICAL ADVICE (OUTPATIENT)
Dept: FAMILY MEDICINE | Facility: CLINIC | Age: 64
End: 2024-10-17
Payer: COMMERCIAL

## 2024-10-17 NOTE — TELEPHONE ENCOUNTER
Patient Quality Outreach    Patient is due for the following:   Diabetes -  Foot Exam  Physical Preventive Adult Physical      Topic Date Due    Pneumococcal Vaccine (2 of 2 - PCV) 11/03/2017    COVID-19 Vaccine (5 - 2024-25 season) 09/01/2024       Next Steps:   No follow up needed at this time.    Type of outreach:    Sent [employer] message.    Next Steps:  Reach out within 90 days via [employer].    Max number of attempts reached: No. Will try again in 90 days if patient still on fail list.    Questions for provider review:    None           Denise Grey CMA  Chart routed to Care Team.

## 2024-10-21 NOTE — MR AVS SNAPSHOT
After Visit Summary   7/25/2018    Lizzie Chow    MRN: [id number]           Patient Information     Date Of Birth          1960        Visit Information        Provider Department      7/25/2018 7:20 AM Gladys oSlis MD [facility]        Today's Diagnoses     Type 2 diabetes mellitus without complication, without long-term current use of insulin (H)    -  1    Hypertension goal BP (blood pressure) < 140/90        Hyperlipidemia with target LDL less than 100        Acquired hypothyroidism           Follow-ups after your visit        Follow-up notes from your care team     Return in about 3 months (around 10/25/2018) for Diabetes Visit.      Who to contact     If you have questions or need follow up information about today's clinic visit or your schedule please contact [facility] directly at [phone].  Normal or non-critical lab and imaging results will be communicated to you by MyChart, letter or phone within 4 business days after the clinic has received the results. If you do not hear from us within 7 days, please contact the clinic through [employer] or phone. If you have a critical or abnormal lab result, we will notify you by phone as soon as possible.  Submit refill requests through [employer] or call your pharmacy and they will forward the refill request to us. Please allow 3 business days for your refill to be completed.          Additional Information About Your Visit        MyChart Information     [employer] gives you secure access to your electronic health record. If you see a primary care provider, you can also send messages to your care team and make appointments. If you have questions, please call your primary care clinic.  If you do not have a primary care provider, please call [phone] and they will assist you.        Care EveryWhere ID     This is your Care EveryWhere ID. This could be used by other organizations to access your [place]  "medical records  [id number]        Your Vitals Were     Pulse Temperature Height Breastfeeding? BMI (Body Mass Index)       78 97.8  F (36.6  C) (Oral) 5' 7\" (1.702 m) No 34.61 kg/m2        Blood Pressure from Last 3 Encounters:   07/25/18 130/86   07/18/18 136/86   03/23/18 134/86    Weight from Last 3 Encounters:   07/25/18 221 lb (100.2 kg)   07/18/18 223 lb (101.2 kg)   03/23/18 218 lb (98.9 kg)              We Performed the Following     ADMIN 1st VACCINE     Basic metabolic panel  (Ca, Cl, CO2, Creat, Gluc, K, Na, BUN)     Hemoglobin A1c     Lipid panel reflex to direct LDL Fasting     TDAP, IM (10 - 64 YRS) - Adacel     TSH with free T4 reflex        Primary Care Provider Office Phone # Fax #    Gladys Julián Solis [phone] [address] [facility] [address]        Equal Access to Services     SIRI ADAMS : Hadii calderon ku hadasho Soomaali, waaxda luqadaha, qaybta kaalmada adeegyada, mago rios . So [facility] [phone].    ATENCIÓN: Si habla español, tiene a mcdonald disposición servicios gratuitos de asistencia lingüística. Llame al [phone].    We comply with applicable federal civil rights laws and [place] laws. We do not discriminate on the basis of race, color, national origin, age, disability, sex, sexual orientation, or gender identity.            Thank you!     Thank you for choosing [facility]  for your care. Our goal is always to provide you with excellent care. Hearing back from our patients is one way we can continue to improve our services. Please take a few minutes to complete the written survey that you may receive in the mail after your visit with us. Thank you!             Your Updated Medication List - Protect others around you: Learn how to safely use, store and throw away your medicines at www.disposemymeds.org.          This list is accurate as of 7/25/18  7:51 AM.  Always use your most recent med list.             "       Brand Name Dispense Instructions for use Diagnosis    albuterol 108 (90 Base) MCG/ACT Inhaler    PROAIR HFA/PROVENTIL HFA/VENTOLIN HFA    1 Inhaler    Inhale 2 puffs into the lungs every 4 hours as needed    Cough       aspirin 81 MG EC tablet     30 tablet    Take 1 tablet (81 mg) by mouth daily    CAD (coronary artery disease)       atorvastatin 80 MG tablet    LIPITOR    90 tablet    Take 1 tablet (80 mg) by mouth daily    Mixed hyperlipidemia       carvedilol 6.25 MG tablet    COREG    180 tablet    Take 1 tablet (6.25 mg) by mouth 2 times daily    Coronary artery disease involving native coronary artery with unstable angina pectoris (H)       levothyroxine 25 MCG tablet    SYNTHROID/LEVOTHROID    90 tablet    Take 1 tablet (25 mcg) by mouth daily    Acquired hypothyroidism       lisinopril 2.5 MG tablet    PRINIVIL/Zestril    90 tablet    Take 1 tablet (2.5 mg) by mouth daily    Benign essential hypertension       nitroGLYcerin 0.4 MG sublingual tablet    NITROSTAT    25 tablet    Place 1 tablet (0.4 mg) under the tongue every 5 minutes as needed for chest pain    Coronary artery disease of native heart with stable angina pectoris, unspecified vessel or lesion type (H)       order for DME     1 Units    Equipment being ordered: forearm strap    Lateral epicondylitis of right elbow          Opt out Alert-The patient is alert, awake and responds to voice. The patient is oriented to time, place, and person. The triage nurse is able to obtain subjective information.

## 2024-11-15 NOTE — TELEPHONE ENCOUNTER
NEUROLOGY PRE- VISIT      RECORDS RECEIVED FROM: Referred by ALFREDO KUMAR   REASON FOR VISIT: Numbness of toes,   PROVIDER: Ana   DATE OF APPT: 11/26/2024   NOTES (FOR ALL VISITS) STATUS DETAILS   OFFICE NOTE from referring provider Internal Referral and notes in chart   OFFICE NOTE from other specialist N/A    DISCHARGE SUMMARY from hospital N/A    DISCHARGE REPORT from ER N/A    EEG N/A    EMG REPORT N/A         IMAGING  (FOR ALL VISITS)     MRI (HEAD, NECK, SPINE) N/A    CT (HEAD, NECK, SPINE) N/A    XR  (HEAD, NECK, SPINE) N/A

## 2024-11-25 NOTE — PROGRESS NOTES
INITIAL NEUROLOGY CONSULTATION    DATE OF VISIT: 11/26/2024  CLINIC LOCATION: [facility]  MRN: [id number]  PATIENT NAME: Lizzie Chow  YOB: 1960    REASON FOR VISIT: No chief complaint on file.    HISTORY OF PRESENT ILLNESS:                                                    Ms. Lizzie Chow is 63 year old right handed female patient with past medical history of hypertension, hyperlipidemia, hypothyroidism, obesity, coronary artery disease, diabetes mellitus type 2, and plantar fasciitis, who was seen today for bilateral toe paresthesia.    Per patient's report, ***.  She denies any additional focal neurological symptoms.    Laboratory evaluation from September 2024 includes unremarkable CMP, except glucose of 146, elevated hemoglobin A1C of 6.4, normal LDL (67), normal TSH (3.7) and free T4 (1.09).  Vitamin B12 was last checked in November 2022 (370).    No additional useful information is available in Care Everywhere, which was reviewed.  PAST MEDICAL/SURGICAL HISTORY:                                                    I personally reviewed patient's past medical and surgical history with the patient at today's visit.  MEDICATIONS:                                                    I personally reviewed patient's medications and allergies with the patient at today's visit.  ALLERGIES:                                                      Allergies   Allergen Reactions    No Known Drug Allergy      EXAM:                                                    VITAL SIGNS:   LMP 07/01/2013   Mini-Cog Assessment:       General: pt is in NAD, cooperative.  Skin: normal turgor, moist mucous membranes, no lesions/rashes noticed.  HEENT: ATNC, EOMI, PERRL, white sclera, normal conjunctiva, no nystagmus or ptosis. No carotid bruits bilaterally.  Respiratory: lung sounds clear to auscultation bilaterally, no crackles, wheezes, rhonchi. Symmetric lung excursion, no accessory respiratory muscle  use.  Cardiovascular: normal S1/S2, no murmurs/rubs/gallops.   Abdomen: Not distended.  : deferred.    Neurological:  Mental: alert, follows commands,  /5 with ***/3 on memory recall, no aphasia or dysarthria. Fund of knowledge is {MYAPPROPRIATE:820248}  Cranial Nerves:  CN II: visual acuity - able to accurately count fingers with each eye. Visual fields intact, fundi: discs sharp, no papilledema and normal vessels bilaterally.  CN III, IV, VI: EOM intact, pupils equal and reactive  CN V: facial sensation nl  CN VII: face symmetric, no facial droop  CN VIII: hearing normal  CN IX: palate elevation symmetric, uvula at midline  CN XI SCM normal, shoulder shrug nl  CN XII: tongue midline  Motor: Strength: 5/5 in all major groups of all extremities. Normal tone. No abnormal movements. No pronator drift b/l.  Reflexes: Triceps, biceps, brachioradialis, patellar, and achilles reflexes normal and symmetric. No clonus noted. Toes are down-going b/l.   Sensory: light touch, pinprick, and vibration intact. Romberg: negative.  Coordination: FNF and heel-shin tests intact b/l.   Gait:  Normal, able to tandem walk *** without difficulty.  DATA:   LABS/EEG/IMAGING/OTHER STUDIES: I reviewed pertinent medical records, as detailed in the history of present illness.  ASSESSMENT AND PLAN:      ASSESSMENT: Lizzie Chow is a 63 year old female patient with listed above past medical history, who presents with ***.    We had a detailed discussion with the patient regarding her presenting complaints.  The neurological exam today is ***.    DIAGNOSES:  No diagnosis found.  PLAN: There are no Patient Instructions on file for this visit.    Total Time: *** minutes spent on the date of the encounter doing chart review, history and exam, documentation and further activities per the note.    Jonny Perez MD  [facility] [place] [facility] Neurology  (Chart documentation was completed in part with Dragon voice-recognition software. Even though  reviewed, some grammatical, spelling, and word errors may remain.)

## 2024-11-26 ENCOUNTER — OFFICE VISIT (OUTPATIENT)
Dept: NEUROLOGY | Facility: CLINIC | Age: 64
End: 2024-11-26
Attending: FAMILY MEDICINE
Payer: COMMERCIAL

## 2024-11-26 ENCOUNTER — PRE VISIT (OUTPATIENT)
Dept: NEUROLOGY | Facility: CLINIC | Age: 64
End: 2024-11-26

## 2024-11-26 VITALS — OXYGEN SATURATION: 97 % | SYSTOLIC BLOOD PRESSURE: 150 MMHG | DIASTOLIC BLOOD PRESSURE: 84 MMHG | HEART RATE: 61 BPM

## 2024-11-26 DIAGNOSIS — R20.2 PARESTHESIA OF BOTH FEET: Primary | ICD-10-CM

## 2024-11-26 PROCEDURE — G2211 COMPLEX E/M VISIT ADD ON: HCPCS | Performed by: PSYCHIATRY & NEUROLOGY

## 2024-11-26 PROCEDURE — 99204 OFFICE O/P NEW MOD 45 MIN: CPT | Performed by: PSYCHIATRY & NEUROLOGY

## 2024-11-26 RX ORDER — MAGNESIUM 200 MG
1 TABLET ORAL DAILY
COMMUNITY
Start: 2024-05-10

## 2024-11-26 RX ORDER — CALCIUM CARBONATE 500(1250)
1 TABLET ORAL 2 TIMES DAILY
COMMUNITY

## 2024-11-26 NOTE — PROGRESS NOTES
"Lizzie Chow is a 63 year old female who presents for:  Chief Complaint   Patient presents with    Consult     Numbness- feet and legs (also feels like they are weak at times)         Initial Vitals:  BP (!) 154/84 (BP Location: Right arm, Patient Position: Sitting, Cuff Size: Adult Large)   Pulse 66   LMP 07/01/2013   SpO2 96%  Estimated body mass index is 34.38 kg/m  as calculated from the following:    Height as of 1/30/24: 1.676 m (5' 6\").    Weight as of 1/30/24: 96.6 kg (213 lb).. There is no height or weight on file to calculate BSA. BP completed using cuff size: ezequiel Dempsey   "

## 2024-11-26 NOTE — PROGRESS NOTES
INITIAL NEUROLOGY CONSULTATION    DATE OF VISIT: 11/26/2024  CLINIC LOCATION: [facility]  MRN: [id number]  PATIENT NAME: Lizzie Chow  YOB: 1960    REASON FOR VISIT:   Chief Complaint   Patient presents with    Consult     Numbness- feet and legs (also feels like they are weak at times)      HISTORY OF PRESENT ILLNESS:                                                    Ms. Lizzie Chow is 63 year old right handed female patient with past medical history of hypertension, hyperlipidemia, hypothyroidism, obesity, coronary artery disease, diabetes mellitus type 2, and plantar fasciitis, who was seen today for bilateral toe paresthesia/pain.    Per patient's report, symptoms started about 3 years ago.  She developed numbness at the bottom of her feet and tingling on top of her feet as well as varied degree of pain that by the end of the day affects her ability to walk.  At times her legs feel weaker.  She experiences feet cramps.  Tried soaking them in water and lotion for neuropathy without relief.  She denies any additional focal neurological symptoms.    Family history is positive for brain bleed/stroke and dementia.    Laboratory evaluation from September 2024 includes unremarkable CMP, except glucose of 146, elevated hemoglobin A1C of 6.4, normal LDL (67), normal TSH (3.7) and free T4 (1.09).  Vitamin B12 was last checked in November 2022 (370).    No additional useful information is available in Care Everywhere, which was reviewed.  PAST MEDICAL/SURGICAL HISTORY:                                                    I personally reviewed patient's past medical and surgical history with the patient at today's visit.  MEDICATIONS:                                                    I personally reviewed patient's medications and allergies with the patient at today's visit.  ALLERGIES:                                                      Allergies   Allergen Reactions    No Known Drug  Allergy      EXAM:                                                    VITAL SIGNS:   BP (!) 154/84 (BP Location: Right arm, Patient Position: Sitting, Cuff Size: Adult Large)   Pulse 66   LMP 07/01/2013   SpO2 96%   Mini-Cog Assessment:  Mini Cog Assessment  Clock Draw Score: 2 Normal  3 Item Recall: 3 objects recalled  Mini Cog Total Score: 5  Administered by: : Grace PERRY    General: pt is in NAD, cooperative.  Skin: normal turgor, moist mucous membranes, no lesions/rashes noticed.  HEENT: ATNC, EOMI, PERRL, white sclera, normal conjunctiva, no nystagmus or ptosis. No carotid bruits bilaterally.  Respiratory: lung sounds clear to auscultation bilaterally, no crackles, wheezes, rhonchi. Symmetric lung excursion, no accessory respiratory muscle use.  Cardiovascular: normal S1/S2, no murmurs/rubs/gallops.   Abdomen: Not distended.  : deferred.    Neurological:  Mental: alert, follows commands, Mini Cog Total Score: 5/5 with 3/3 on memory recall, no aphasia or dysarthria. Fund of knowledge is appropriate for age.  Cranial Nerves:  CN II: visual acuity - able to accurately count fingers with each eye. Visual fields intact, fundi: discs sharp, no papilledema and normal vessels bilaterally.  CN III, IV, VI: EOM intact, pupils equal and reactive  CN V: facial sensation nl  CN VII: face symmetric, no facial droop  CN VIII: hearing normal  CN IX: palate elevation symmetric, uvula at midline  CN XI SCM normal, shoulder shrug nl  CN XII: tongue midline  Motor: Strength: 5/5 in all major groups of all extremities. Normal tone. No abnormal movements. No pronator drift b/l.  Reflexes: Triceps, biceps, brachioradialis, and patellar reflexes normal and symmetric, achilles reflexes are mildly reduced. No clonus noted. Toes are down-going b/l.   Sensory: light touch, pinprick, and vibration reduced in distal lower extremities and both feet. Romberg: negative.  Coordination: FNF and heel-shin tests intact b/l.   Gait:  Normal, able  to tandem walk with mild difficulty.  DATA:   LABS/EEG/IMAGING/OTHER STUDIES: I reviewed pertinent medical records, as detailed in the history of present illness.  ASSESSMENT AND PLAN:      ASSESSMENT: Lizzie Chow is a 63 year old female patient with listed above past medical history, who presents with bilateral feet paresthesias and pain for the last 3 years.    We had a detailed discussion with the patient regarding her presenting complaints.  The neurological exam today is suggestive of peripheral polyneuropathy.  Lumbar radiculopathy is less likely, but also included in the differential.  For further diagnostic clarification, I ordered EMG.  Will plan on additional lab work if neuropathy is confirmed and will discuss treatment options at the next visit.    Lizzie to follow up with Primary Care provider regarding elevated blood pressure.     DIAGNOSES:    ICD-10-CM    1. Paresthesia of both feet  R20.2 Adult Neurology  Referral     EMG        PLAN: At today's visit we thoroughly discussed various diagnostic possibilities for patient's symptoms, necessary evaluation, and the plan, which includes:  Orders Placed This Encounter   Procedures    EMG     No new medications.     Additional recommendations after the work-up.    Next follow-up appointment is in the next 2-3 months or earlier if needed.    Total Time: 47 minutes spent on the date of the encounter doing chart review, history and exam, documentation and further activities per the note.    Jonny Perez MD  [facility] Neurology  (Chart documentation was completed in part with Dragon voice-recognition software. Even though reviewed, some grammatical, spelling, and word errors may remain.)

## 2024-11-26 NOTE — PATIENT INSTRUCTIONS
AFTER VISIT SUMMARY (AVS):    At today's visit we thoroughly discussed various diagnostic possibilities for your symptoms, necessary evaluation, and the plan, which includes:  Orders Placed This Encounter   Procedures    EMG     No new medications.     Additional recommendations after the work-up.    Next follow-up appointment is in the next 2-3 months or earlier if needed.    Please do not hesitate to call me with any questions or concerns.    Thanks.

## 2024-11-26 NOTE — LETTER
11/26/2024      Lizzie Chow  [address] [address] [place] [id number]      Dear Colleague,    Thank you for referring your patient, Lizzie Chow, to the [facility] NEUROLOGY CLINICS [facility]. Please see a copy of my visit note below.    INITIAL NEUROLOGY CONSULTATION    DATE OF VISIT: 11/26/2024  CLINIC LOCATION: [facility]  MRN: [id number]  PATIENT NAME: Lizzie Chow  YOB: 1960    REASON FOR VISIT:   Chief Complaint   Patient presents with     Consult     Numbness- feet and legs (also feels like they are weak at times)      HISTORY OF PRESENT ILLNESS:                                                    Ms. Lizzie Chow is 63 year old right handed female patient with past medical history of hypertension, hyperlipidemia, hypothyroidism, obesity, coronary artery disease, diabetes mellitus type 2, and plantar fasciitis, who was seen today for bilateral toe paresthesia/pain.    Per patient's report, symptoms started about 3 years ago.  She developed numbness at the bottom of her feet and tingling on top of her feet as well as varied degree of pain that by the end of the day affects her ability to walk.  At times her legs feel weaker.  She experiences feet cramps.  Tried soaking them in water and lotion for neuropathy without relief.  She denies any additional focal neurological symptoms.    Family history is positive for brain bleed/stroke and dementia.    Laboratory evaluation from September 2024 includes unremarkable CMP, except glucose of 146, elevated hemoglobin A1C of 6.4, normal LDL (67), normal TSH (3.7) and free T4 (1.09).  Vitamin B12 was last checked in November 2022 (370).    No additional useful information is available in Care Everywhere, which was reviewed.  PAST MEDICAL/SURGICAL HISTORY:                                                    I personally reviewed patient's past medical and surgical history with the patient at today's visit.  MEDICATIONS:                                                     I personally reviewed patient's medications and allergies with the patient at today's visit.  ALLERGIES:                                                      Allergies   Allergen Reactions     No Known Drug Allergy      EXAM:                                                    VITAL SIGNS:   BP (!) 154/84 (BP Location: Right arm, Patient Position: Sitting, Cuff Size: Adult Large)   Pulse 66   LMP 07/01/2013   SpO2 96%   Mini-Cog Assessment:  Mini Cog Assessment  Clock Draw Score: 2 Normal  3 Item Recall: 3 objects recalled  Mini Cog Total Score: 5  Administered by: : Grace PERRY    General: pt is in NAD, cooperative.  Skin: normal turgor, moist mucous membranes, no lesions/rashes noticed.  HEENT: ATNC, EOMI, PERRL, white sclera, normal conjunctiva, no nystagmus or ptosis. No carotid bruits bilaterally.  Respiratory: lung sounds clear to auscultation bilaterally, no crackles, wheezes, rhonchi. Symmetric lung excursion, no accessory respiratory muscle use.  Cardiovascular: normal S1/S2, no murmurs/rubs/gallops.   Abdomen: Not distended.  : deferred.    Neurological:  Mental: alert, follows commands, Mini Cog Total Score: 5/5 with 3/3 on memory recall, no aphasia or dysarthria. Fund of knowledge is appropriate for age.  Cranial Nerves:  CN II: visual acuity - able to accurately count fingers with each eye. Visual fields intact, fundi: discs sharp, no papilledema and normal vessels bilaterally.  CN III, IV, VI: EOM intact, pupils equal and reactive  CN V: facial sensation nl  CN VII: face symmetric, no facial droop  CN VIII: hearing normal  CN IX: palate elevation symmetric, uvula at midline  CN XI SCM normal, shoulder shrug nl  CN XII: tongue midline  Motor: Strength: 5/5 in all major groups of all extremities. Normal tone. No abnormal movements. No pronator drift b/l.  Reflexes: Triceps, biceps, brachioradialis, and patellar reflexes normal and symmetric, achilles reflexes are mildly  reduced. No clonus noted. Toes are down-going b/l.   Sensory: light touch, pinprick, and vibration reduced in distal lower extremities and both feet. Romberg: negative.  Coordination: FNF and heel-shin tests intact b/l.   Gait:  Normal, able to tandem walk with mild difficulty.  DATA:   LABS/EEG/IMAGING/OTHER STUDIES: I reviewed pertinent medical records, as detailed in the history of present illness.  ASSESSMENT AND PLAN:      ASSESSMENT: Lizzie Chow is a 63 year old female patient with listed above past medical history, who presents with bilateral feet paresthesias and pain for the last 3 years.    We had a detailed discussion with the patient regarding her presenting complaints.  The neurological exam today is suggestive of peripheral polyneuropathy.  Lumbar radiculopathy is less likely, but also included in the differential.  For further diagnostic clarification, I ordered EMG.  Will plan on additional lab work if neuropathy is confirmed and will discuss treatment options at the next visit.    Lizzie to follow up with Primary Care provider regarding elevated blood pressure.     DIAGNOSES:    ICD-10-CM    1. Paresthesia of both feet  R20.2 Adult Neurology  Referral     EMG        PLAN: At today's visit we thoroughly discussed various diagnostic possibilities for patient's symptoms, necessary evaluation, and the plan, which includes:  Orders Placed This Encounter   Procedures     EMG     No new medications.     Additional recommendations after the work-up.    Next follow-up appointment is in the next 2-3 months or earlier if needed.    Total Time: 47 minutes spent on the date of the encounter doing chart review, history and exam, documentation and further activities per the note.    Jonny Perez MD  [facility] Neurology  (Chart documentation was completed in part with Dragon voice-recognition software. Even though reviewed, some grammatical, spelling, and word errors may  "remain.)            Lizzie Chow is a 63 year old female who presents for:  Chief Complaint   Patient presents with     Consult     Numbness- feet and legs (also feels like they are weak at times)         Initial Vitals:  BP (!) 154/84 (BP Location: Right arm, Patient Position: Sitting, Cuff Size: Adult Large)   Pulse 66   LMP 07/01/2013   SpO2 96%  Estimated body mass index is 34.38 kg/m  as calculated from the following:    Height as of 1/30/24: 1.676 m (5' 6\").    Weight as of 1/30/24: 96.6 kg (213 lb).. There is no height or weight on file to calculate BSA. BP completed using cuff size: large    Grace Dempsey       Again, thank you for allowing me to participate in the care of your patient.        Sincerely,        Jonny Perez MD  "

## 2024-11-29 NOTE — TELEPHONE ENCOUNTER
----- Message from Gladys Solis MD sent at 10/26/2020  8:05 AM CDT -----  Please call - change in med documented in chart.     JH  
Dose change in thyroid medicine.  Pt is to take 50 mcg 3 days a week and 25 4 days a week    LMTRC to KAYLA Robles RN, BSN    
Pt called back and informed of change  Timmy Robles RN, BSN    
Yes

## 2025-01-09 ENCOUNTER — OFFICE VISIT (OUTPATIENT)
Dept: URGENT CARE | Facility: URGENT CARE | Age: 65
End: 2025-01-09
Payer: COMMERCIAL

## 2025-01-09 ENCOUNTER — ANCILLARY PROCEDURE (OUTPATIENT)
Dept: GENERAL RADIOLOGY | Facility: CLINIC | Age: 65
End: 2025-01-09
Attending: NURSE PRACTITIONER
Payer: COMMERCIAL

## 2025-01-09 VITALS
DIASTOLIC BLOOD PRESSURE: 91 MMHG | BODY MASS INDEX: 33.09 KG/M2 | TEMPERATURE: 97.9 F | SYSTOLIC BLOOD PRESSURE: 160 MMHG | WEIGHT: 205 LBS | HEART RATE: 67 BPM | OXYGEN SATURATION: 95 %

## 2025-01-09 DIAGNOSIS — J01.00 ACUTE NON-RECURRENT MAXILLARY SINUSITIS: ICD-10-CM

## 2025-01-09 DIAGNOSIS — R05.1 ACUTE COUGH: ICD-10-CM

## 2025-01-09 DIAGNOSIS — T36.95XA ANTIBIOTIC-INDUCED YEAST INFECTION: ICD-10-CM

## 2025-01-09 DIAGNOSIS — B37.9 ANTIBIOTIC-INDUCED YEAST INFECTION: ICD-10-CM

## 2025-01-09 DIAGNOSIS — J22 ACUTE LOWER RESPIRATORY INFECTION: Primary | ICD-10-CM

## 2025-01-09 RX ORDER — FLUTICASONE PROPIONATE 50 MCG
1 SPRAY, SUSPENSION (ML) NASAL DAILY
Qty: 16 G | Refills: 0 | Status: SHIPPED | OUTPATIENT
Start: 2025-01-09 | End: 2025-02-08

## 2025-01-09 RX ORDER — ALBUTEROL SULFATE 90 UG/1
2 INHALANT RESPIRATORY (INHALATION) EVERY 6 HOURS PRN
Qty: 18 G | Refills: 0 | Status: SHIPPED | OUTPATIENT
Start: 2025-01-09 | End: 2025-02-08

## 2025-01-09 RX ORDER — FLUCONAZOLE 150 MG/1
150 TABLET ORAL
Qty: 3 TABLET | Refills: 0 | Status: SHIPPED | OUTPATIENT
Start: 2025-01-09 | End: 2025-01-16

## 2025-01-09 RX ORDER — AZITHROMYCIN 250 MG/1
TABLET, FILM COATED ORAL
Qty: 6 TABLET | Refills: 0 | Status: SHIPPED | OUTPATIENT
Start: 2025-01-09 | End: 2025-01-14

## 2025-01-09 RX ORDER — IPRATROPIUM BROMIDE AND ALBUTEROL SULFATE 2.5; .5 MG/3ML; MG/3ML
3 SOLUTION RESPIRATORY (INHALATION) ONCE
Status: COMPLETED | OUTPATIENT
Start: 2025-01-09 | End: 2025-01-09

## 2025-01-09 RX ADMIN — IPRATROPIUM BROMIDE AND ALBUTEROL SULFATE 3 ML: 2.5; .5 SOLUTION RESPIRATORY (INHALATION) at 14:27

## 2025-01-09 ASSESSMENT — ENCOUNTER SYMPTOMS
FEVER: 1
FATIGUE: 1
SINUS PRESSURE: 1
SORE THROAT: 0
WHEEZING: 1
CHILLS: 1
SHORTNESS OF BREATH: 1
EYES NEGATIVE: 1
RHINORRHEA: 1
CARDIOVASCULAR NEGATIVE: 1
SINUS PAIN: 1
COUGH: 1

## 2025-01-09 NOTE — PROGRESS NOTES
Assessment & Plan       ICD-10-CM    1. Acute lower respiratory infection  J22 amoxicillin-clavulanate (AUGMENTIN) 875-125 MG tablet     azithromycin (ZITHROMAX) 250 MG tablet     albuterol (PROAIR HFA/PROVENTIL HFA/VENTOLIN HFA) 108 (90 Base) MCG/ACT inhaler      2. Acute cough  R05.1 ipratropium - albuterol 0.5 mg/2.5 mg/3 mL (DUONEB) neb solution 3 mL     INHALATION/NEBULIZER TREATMENT, INITIAL     XR Chest 2 Views      3. Acute non-recurrent maxillary sinusitis  J01.00 amoxicillin-clavulanate (AUGMENTIN) 875-125 MG tablet     azithromycin (ZITHROMAX) 250 MG tablet     fluticasone (FLONASE) 50 MCG/ACT nasal spray      4. Antibiotic-induced yeast infection  B37.9 fluconazole (DIFLUCAN) 150 MG tablet    T36.95XA           Patient Instructions   Augmentin 875/125 one tablet twice a day for 7 days    Azithromycin two tablets the first day and one tablet once daily for the next 4 days.    Flonase nasal spray one spray in each nostril twice daily for 14 days then once daily for the next 14 days after that    Albuterol inhaler 2 puffs every 6 hours while awake consistently over the next 3-5 days.    Diflucan (fluconazole) 150 mg tablet every 72 hours as needed for antibiotic induced vaginal yeast infections.    Increase fluids with water, Pedialyte, Gatorade, or rehydrating beverages.     Alternate Tylenol and Ibuprofen as needed for aches, pains or fever.     If needed, follow soft food diet. Rest as much as possible. Run humidifier at night. Gargle with hot salty water. Have warm tea or water with honey.     Follow up in clinic if symptoms persist or worsen.     Follow up with any problems, questions or concerns or if symptoms worsen or fail to improve. Patient agreed to the treatment plan and verbalized understanding.     I independently visualized the xray: Increased haziness in the left lower lobe. Radiologist's read is pending at this time.      Munir Samuel is a 64 year old female who presents to clinic  today for the following health issues:  Chief Complaint   Patient presents with    Urgent Care     X10 Days congestion, nasal congestion, chest congestion, productive cough, green/yellow, fatigued, fever initially but better, sinus pain, chest pain with cough, sob, chest pressure, wheezing, hard to take a deep breath, waking up in sweats and sob, taking sudafed and increased fluids   Covid test negative at home      HPI  Patient states for over 10 days she has had sinus congestion, nasal drainage, chest congestion, cough, and fatigue.  She feels that her chest congestion is worsening and she is now experiencing wheezing with shortness of breath at times.  She states she initially had fevers but that resolved and now she is having sweats and chills again.  She states she did take an at home COVID test which was negative.  She states she has been able to eat and drink well.      Review of Systems   Constitutional:  Positive for chills, fatigue and fever.   HENT:  Positive for congestion, postnasal drip, rhinorrhea, sinus pressure and sinus pain (bilateral maxillary sinuses). Negative for sore throat.    Eyes: Negative.    Respiratory:  Positive for cough, shortness of breath and wheezing.    Cardiovascular: Negative.        Problem List:  2024-08: Numbness of toes  2024-01: Type 2 diabetes mellitus with diabetic polyneuropathy,   without long-term current use of insulin (H)  2022-11: Diabetes mellitus, type 2 (H)  2022-01: Cervical cancer screening  2021-04: Lightheadedness  2021-04: History of non-ST elevation myocardial infarction (NSTEMI)  2019-03: Coronary artery disease of native heart with stable angina   pectoris, unspecified vessel or lesion type  2018-09: Obesity (BMI 35.0-39.9) with comorbidity (H)  2018-09: Coronary artery disease involving native coronary artery,   angina presence unspecified, unspecified whether native or   transplanted heart  2018-07: Type 2 diabetes mellitus without complication,  without long-  term current use of insulin (H)  2016-11: Advanced directives, counseling/discussion  2016-11: Benign essential hypertension  2016-11: Acquired hypothyroidism  2016-07: Type 2 diabetes mellitus without complication (H)  2016-07: Type 2 diabetes, HbA1c goal < 7% (H)  2015-11: Congenital hypothyroidism 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  2015-11: Obesity due to excess calories  2015-09: Prediabetes  2015-09: Hypothyroidism  2015-09: Menopausal state  2014-11: Hyperlipidemia with target LDL less than 100  2014-06: Chest pain  2014-06: NSTEMI (non-ST elevated myocardial infarction) (H)  2014-06: CAD (coronary artery disease)  2013-10: Hypertension goal BP (blood pressure) < 140/90  2013-10: Weight loss, intentional  2013-09: Edema leg  2013-09: Plantar fasciitis  2013-07: Periumbilical hernia  2013-02: Fatty liver  2013-01: Elevated blood pressure reading without diagnosis of   hypertension  2012-09: Other peripheral enthesopathies  2011-01: Family history of coronary artery disease  2011-01: Obesity  2010-10: CARDIOVASCULAR SCREENING; LDL GOAL LESS THAN 160  2008-11: Blood glucose elevated  2008-11: Flat feet  2008-07: Unspecified disorder of menstruation and other abnormal   bleeding from female genital tract  NO ACTIVE PROBLEMS  Former smoker  Ischemic cardiomyopathy  HTN (hypertension)  Tobacco abuse      Past Medical History:   Diagnosis Date    Chronic pain     both feet intermittently    Edema     furosemide    Elevated glucose     Fatty liver disease, nonalcoholic     HTN (hypertension)     Hyperlipidemia     Ischemic cardiomyopathy     EF 40-45%    Lumbago     Obesity (BMI 30-39.9)     Plantar fasciitis     Stress incontinence     Tobacco abuse     Umbilical hernia     has had surgical consult         Social History     Tobacco Use    Smoking status: Former     Current packs/day: 0.00     Average packs/day: 1 pack/day for 32.0 years (32.0 ttl pk-yrs)     Types: Cigarettes     Start date: 7/3/1988     Quit date:  7/3/2020     Years since quittin.5     Passive exposure: Past    Smokeless tobacco: Never   Substance Use Topics    Alcohol use: Yes     Alcohol/week: 0.0 standard drinks of alcohol     Comment: 6 Drinks per Week           Objective    BP (!) 160/91   Pulse 67   Temp 97.9  F (36.6  C) (Oral)   Wt 93 kg (205 lb)   LMP 2013   SpO2 95%   BMI 33.09 kg/m    Physical Exam  Vitals and nursing note reviewed.   Constitutional:       General: She is not in acute distress.     Comments: Appears tired   HENT:      Head: Normocephalic and atraumatic.      Right Ear: Tympanic membrane and ear canal normal.      Left Ear: Tympanic membrane and ear canal normal.      Ears:      Comments: Clear fluid behind TM bilaterally     Nose: Congestion and rhinorrhea (yellow mucous drainage) present.      Mouth/Throat:      Mouth: Mucous membranes are moist.      Pharynx: Oropharynx is clear. Posterior oropharyngeal erythema (posterior pharynx) present. No oropharyngeal exudate.   Eyes:      Extraocular Movements: Extraocular movements intact.      Conjunctiva/sclera: Conjunctivae normal.      Pupils: Pupils are equal, round, and reactive to light.   Cardiovascular:      Rate and Rhythm: Normal rate and regular rhythm.      Heart sounds: Normal heart sounds.   Pulmonary:      Effort: Pulmonary effort is normal. No respiratory distress.      Breath sounds: Wheezing (a few scattered wheezes with diminished lung sounds in the bases) present.      Comments: Status post DuoNeb wheezes have resolved and moving air in the lung bases has improved. A few crackles in the left lung base  Musculoskeletal:      Cervical back: Normal range of motion and neck supple.   Lymphadenopathy:      Cervical: No cervical adenopathy.   Neurological:      Mental Status: She is alert and oriented to person, place, and time.              Evelia Johnson NP

## 2025-01-09 NOTE — PATIENT INSTRUCTIONS
Augmentin 875/125 one tablet twice a day for 7 days    Azithromycin two tablets the first day and one tablet once daily for the next 4 days.    Flonase nasal spray one spray in each nostril twice daily for 14 days then once daily for the next 14 days after that    Albuterol inhaler 2 puffs every 6 hours while awake consistently over the next 3-5 days.    Diflucan (fluconazole) 150 mg tablet every 72 hours as needed for antibiotic induced vaginal yeast infections.    Increase fluids with water, Pedialyte, Gatorade, or rehydrating beverages.     Alternate Tylenol and Ibuprofen as needed for aches, pains or fever.     If needed, follow soft food diet. Rest as much as possible. Run humidifier at night. Gargle with hot salty water. Have warm tea or water with honey.     Follow up in clinic if symptoms persist or worsen.

## 2025-01-25 ENCOUNTER — HEALTH MAINTENANCE LETTER (OUTPATIENT)
Age: 65
End: 2025-01-25

## 2025-01-28 DIAGNOSIS — E03.9 ACQUIRED HYPOTHYROIDISM: ICD-10-CM

## 2025-01-28 RX ORDER — LEVOTHYROXINE SODIUM 25 UG/1
TABLET ORAL
Qty: 90 TABLET | Refills: 0 | Status: SHIPPED | OUTPATIENT
Start: 2025-01-28

## 2025-02-04 ENCOUNTER — OFFICE VISIT (OUTPATIENT)
Dept: NEUROLOGY | Facility: CLINIC | Age: 65
End: 2025-02-04
Payer: COMMERCIAL

## 2025-02-04 DIAGNOSIS — R20.2 NUMBNESS AND TINGLING OF BOTH FEET: Primary | ICD-10-CM

## 2025-02-04 DIAGNOSIS — R20.0 NUMBNESS AND TINGLING OF BOTH FEET: Primary | ICD-10-CM

## 2025-02-04 NOTE — PROGRESS NOTES
[facility]  Electrodiagnostic Laboratory                 Department of Neurology                                                                                                         Test Date:  2025    Patient: Lizzie Chow : 1960 Physician: Grupo Garcia MD   Sex: Female AGE: 64 year Ref Phys: Jonny Perez MD   ID#: [id number]   Technician: Cielo Jean     History and Examination:    64-year-old woman with history of type 2 diabetes who reports constant numbness and tingling in both feet.  She does not have any recent back pain or sciatica.  EMG was requested to evaluate for large fiber polyneuropathy, versus lumbosacral radiculopathies.    Techniques:    Motor and sensory nerve conduction studies were done with surface recording electrodes.Temperature was monitored and recorded throughout the study. Upper extremities were maintained at a temperature of 32 degrees Centigrade or higher and Lower extremities were maintained at a temperature of 31 degrees Centigrade or higher.  EMG was done with a concentric needle electrode.     Results    Bilateral fibular (EDB), and tibial (AH) motor nerve conduction studies were normal.  Right fibular F-wave latencies were normal.  Right tibial F-wave latencies were minimally prolonged (uncertain significance).  Bilateral superficial fibular and sural antidromic sensory nerve conduction studies were normal.  Needle EMG of the bilateral TA, medial gastrocnemius, left vastus lateralis, and right vastus medialis, was normal.    Interpretation:    Normal study.  There is no electrodiagnostic evidence of large fiber polyneuropathy or lumbosacral radiculopathy on either side.  I explained to the patient that a negative EMG study cannot rule out a small fiber neuropathy, which may present with the symptoms she is describing.  Clinical correlation is recommended.      ___________________________  Grupo Garcia  MD        Nerve Conduction Studies  Motor Sites      Latency Neg. Amp Neg. Amp Diff Segment Distance Velocity Neg. Dur Neg Area Diff Temperature Comment   Site (ms) Norm (mV) Norm (%)  cm m/s Norm (ms) (%) ( C)    Left Fibular (EDB) Motor   Ankle 4.2  < 6.0 7.7 -  Ankle-EDB 8   5.6  29.6    Right Fibular (EDB) Motor   Ankle 4.0  < 6.0 6.8 -  Ankle-EDB 8   5.7  29.3    Bel Fibular Head 10.6 - 5.9 - -13 Bel Fibular Head-Ankle 28 42  > 38 5.9 -6 29.3    Pop Fossa 12.0 - 5.9 - 0 Pop Fossa-Bel Fibular Head 7.5 54  > 38 6.2 3 29.5    Left Tibial (AHB) Motor   Ankle 4.5  < 6.5 7.9  > 5.0  Ankle-AH 6.3   7.3  29.7    Right Tibial (AHB) Motor   Ankle 4.5  < 6.5 7.1  > 5.0  Ankle-AH 6.5   6.3  29.7    Knee 13.0 - 3.8 - -46 Knee-Ankle 33.5 39  > 38 8.5 -22 29.6      F-Wave Sites      Min F-Lat Max-Min F-Lat Mean F-Lat   Site (ms) (ms) (ms)   Right Fibular F-Wave   Ankle 49.9 14.6 -   Right Tibial F-Wave   Ankle 62.3 13.4 -     Sensory Sites      Onset Lat Peak Lat Amp (O-P) Amp (P-P) Segment Distance Velocity Temperature Comment   Site ms (ms)  V Norm ( V)  cm m/s Norm ( C)    Left Superficial Fibular Sensory   Lower Leg-Ankle 3.4 4.3 9  > 3 10 Lower Leg-Ankle 14 41 - 29.3    Right Superficial Fibular Sensory   Lower Leg-Ankle 3.3 4.0 5  > 3 7 Lower Leg-Ankle - - - 29.5    Left Sural Sensory   Calf-Lat Malleolus 3.3 4.3 13  > 5 14 Calf-Lat Malleolus 14 42  > 38 29.8    Right Sural Sensory   Calf-Lat Malleolus 3.1 4.0 13  > 5 15 Calf-Lat Malleolus 14 45  > 38 29.6        Electromyography     Side Muscle Ins Act Fibs/PSW Fasc HF Amp Dur Poly Recrt Int Pat   Left Tib ant Nml None Nml 0 Nml Nml 0 Nml Nml   Left Gastroc MH Nml None Nml 0 Nml Nml 0 Nml Nml   Left Vastus lateralis Nml None Nml 0 Nml Nml 0 Nml Nml   Right Tib ant Nml None Nml 0 Nml Nml 0 Nml Nml   Right Gastroc MH Nml None Nml 0 Nml Nml 0 Nml Nml   Right Vastus med Nml None Nml 0 Nml Nml 0 Nml Nml         Waveforms / Images:    Motor                Sensory                 F-Wave

## 2025-02-04 NOTE — LETTER
2025      Lizzie Chow  [address] [address] [place] [phone]      Dear Colleague,    Thank you for referring your patient, Lizzie Chow, to the [facility] NEUROLOGY CLINICS [facility]. Please see a copy of my visit note below.                        [facility]  Electrodiagnostic Laboratory                 Department of Neurology                                                                                                         Test Date:  2025    Patient: Lizzie Chow : 1960 Physician: Grupo Garcia MD   Sex: Female AGE: 64 year Ref Phys: Jonny Perez MD   ID#: [id number]   Technician: Cielo Jean     History and Examination:    64-year-old woman with history of type 2 diabetes who reports constant numbness and tingling in both feet.  She does not have any recent back pain or sciatica.  EMG was requested to evaluate for large fiber polyneuropathy, versus lumbosacral radiculopathies.    Techniques:    Motor and sensory nerve conduction studies were done with surface recording electrodes.Temperature was monitored and recorded throughout the study. Upper extremities were maintained at a temperature of 32 degrees Centigrade or higher and Lower extremities were maintained at a temperature of 31 degrees Centigrade or higher.  EMG was done with a concentric needle electrode.     Results    Bilateral fibular (EDB), and tibial (AH) motor nerve conduction studies were normal.  Right fibular F-wave latencies were normal.  Right tibial F-wave latencies were minimally prolonged (uncertain significance).  Bilateral superficial fibular and sural antidromic sensory nerve conduction studies were normal.  Needle EMG of the bilateral TA, medial gastrocnemius, left vastus lateralis, and right vastus medialis, was normal.    Interpretation:    Normal study.  There is no electrodiagnostic evidence of large fiber polyneuropathy or lumbosacral radiculopathy on either side.  I  explained to the patient that a negative EMG study cannot rule out a small fiber neuropathy, which may present with the symptoms she is describing.  Clinical correlation is recommended.      ___________________________  Grupo Garcia MD        Nerve Conduction Studies  Motor Sites      Latency Neg. Amp Neg. Amp Diff Segment Distance Velocity Neg. Dur Neg Area Diff Temperature Comment   Site (ms) Norm (mV) Norm (%)  cm m/s Norm (ms) (%) ( C)    Left Fibular (EDB) Motor   Ankle 4.2  < 6.0 7.7 -  Ankle-EDB 8   5.6  29.6    Right Fibular (EDB) Motor   Ankle 4.0  < 6.0 6.8 -  Ankle-EDB 8   5.7  29.3    Bel Fibular Head 10.6 - 5.9 - -13 Bel Fibular Head-Ankle 28 42  > 38 5.9 -6 29.3    Pop Fossa 12.0 - 5.9 - 0 Pop Fossa-Bel Fibular Head 7.5 54  > 38 6.2 3 29.5    Left Tibial (AHB) Motor   Ankle 4.5  < 6.5 7.9  > 5.0  Ankle-AH 6.3   7.3  29.7    Right Tibial (AHB) Motor   Ankle 4.5  < 6.5 7.1  > 5.0  Ankle-AH 6.5   6.3  29.7    Knee 13.0 - 3.8 - -46 Knee-Ankle 33.5 39  > 38 8.5 -22 29.6      F-Wave Sites      Min F-Lat Max-Min F-Lat Mean F-Lat   Site (ms) (ms) (ms)   Right Fibular F-Wave   Ankle 49.9 14.6 -   Right Tibial F-Wave   Ankle 62.3 13.4 -     Sensory Sites      Onset Lat Peak Lat Amp (O-P) Amp (P-P) Segment Distance Velocity Temperature Comment   Site ms (ms)  V Norm ( V)  cm m/s Norm ( C)    Left Superficial Fibular Sensory   Lower Leg-Ankle 3.4 4.3 9  > 3 10 Lower Leg-Ankle 14 41 - 29.3    Right Superficial Fibular Sensory   Lower Leg-Ankle 3.3 4.0 5  > 3 7 Lower Leg-Ankle - - - 29.5    Left Sural Sensory   Calf-Lat Malleolus 3.3 4.3 13  > 5 14 Calf-Lat Malleolus 14 42  > 38 29.8    Right Sural Sensory   Calf-Lat Malleolus 3.1 4.0 13  > 5 15 Calf-Lat Malleolus 14 45  > 38 29.6        Electromyography     Side Muscle Ins Act Fibs/PSW Fasc HF Amp Dur Poly Recrt Int Pat   Left Tib ant Nml None Nml 0 Nml Nml 0 Nml Nml   Left Gastroc MH Nml None Nml 0 Nml Nml 0 Nml Nml   Left Vastus lateralis Nml None Nml  0 Nml Nml 0 Nml Nml   Right Tib ant Nml None Nml 0 Nml Nml 0 Nml Nml   Right Gastroc MH Nml None Nml 0 Nml Nml 0 Nml Nml   Right Vastus med Nml None Nml 0 Nml Nml 0 Nml Nml         Waveforms / Images:    Motor                Sensory                F-Wave                   Again, thank you for allowing me to participate in the care of your patient.        Sincerely,        Grupo Garcia MD    Electronically signed

## 2025-03-05 ENCOUNTER — TELEPHONE (OUTPATIENT)
Dept: FAMILY MEDICINE | Facility: CLINIC | Age: 65
End: 2025-03-05
Payer: COMMERCIAL

## 2025-03-05 NOTE — TELEPHONE ENCOUNTER
Patient Quality Outreach    Patient is due for the following:   Physical Preventive Adult Physical    Action(s) Taken:   Schedule a Adult Preventative    Type of outreach:    Sent [employer] message.    Questions for provider review:    None           Esteban Soria CMA  Chart routed to none.

## 2025-03-06 ENCOUNTER — OFFICE VISIT (OUTPATIENT)
Dept: NEUROLOGY | Facility: CLINIC | Age: 65
End: 2025-03-06
Payer: COMMERCIAL

## 2025-03-06 VITALS — HEART RATE: 64 BPM | OXYGEN SATURATION: 97 % | SYSTOLIC BLOOD PRESSURE: 136 MMHG | DIASTOLIC BLOOD PRESSURE: 86 MMHG

## 2025-03-06 DIAGNOSIS — M79.671 BILATERAL FOOT PAIN: ICD-10-CM

## 2025-03-06 DIAGNOSIS — R20.0 NUMBNESS AND TINGLING OF BOTH FEET: Primary | ICD-10-CM

## 2025-03-06 DIAGNOSIS — R20.2 NUMBNESS AND TINGLING OF BOTH FEET: Primary | ICD-10-CM

## 2025-03-06 DIAGNOSIS — M79.672 BILATERAL FOOT PAIN: ICD-10-CM

## 2025-03-06 NOTE — PATIENT INSTRUCTIONS
AFTER VISIT SUMMARY (AVS):    At today's visit we thoroughly discussed current symptoms, evaluation results, additional testing (skin biopsy), and the plan.    We decided to proceed with skin biopsy.    No new medications.    Next follow-up appointment is in the next 8-12 weeks or earlier if needed.    Please do not hesitate to call me with any questions or concerns.    Thanks.

## 2025-03-06 NOTE — LETTER
3/6/2025      Lizzie Chow  [address] [place], [place] [address]      Dear Colleague,    Thank you for referring your patient, Lizzie Chow, to the [facility] NEUROLOGY CLINICS [facility]. Please see a copy of my visit note below.    ESTABLISHED PATIENT NEUROLOGY NOTE    DATE OF VISIT: 3/6/2025  CLINIC LOCATION: [facility]  MRN: [id number]  PATIENT NAME: Lizzie Chow  YOB: 1960    REASON FOR VISIT:   Chief Complaint   Patient presents with     RECHECK     Review the emg, condition stable     SUBJECTIVE:                                                      HISTORY OF PRESENT ILLNESS: Patient is here to follow up regarding bilateral feet paresthesia and pain. Please refer to my initial note from 11/26/2024 for further information.    Since the last visit, the patient reports no changes.  Denies interval development of new focal neurological symptoms.    EMG from 2//2025 was normal without evidence of large fiber peripheral polyneuropathy or lumbosacral radiculopathy on either side.  Tabulated data from EMG report were personally reviewed and independently interpreted.  EXAM:                                                    Physical Exam:   Vitals: /86 (BP Location: Left arm, Patient Position: Sitting, Cuff Size: Adult Large)   Pulse 64   LMP 07/01/2013   SpO2 97%     General: pt is in NAD, cooperative.  Skin: normal turgor, moist mucous membranes, no lesions/rashes noticed.  HEENT: ATNC, white sclera, normal conjunctiva.  Respiratory: Symmetric lung excursion, no accessory respiratory muscle use.  Abdomen: Non distended.  Neurological: awake, cooperative, follows commands, no exam changes compared to the initial visit.  ASSESSMENT AND PLAN:                                                    Assessment: 64 year old female patient presents for follow-up of bilateral feet paresthesias and pain after completion of EMG, which was normal.  We discussed that large fiber  peripheral polyneuropathy was excluded, but small fiber peripheral polyneuropathy remains a possibility.  We will proceed with skin biopsy for further evaluation.  We also reviewed nonspecific treatment options for neuropathy, but decided to hold off until evaluation is completed.  Will also plan to check additional labs if skin biopsy is positive for small fiber peripheral polyneuropathy.    Diagnoses:    ICD-10-CM    1. Numbness and tingling of both feet  R20.0     R20.2       2. Bilateral foot pain  M79.671     M79.672         Plan: At today's visit we thoroughly discussed current symptoms, evaluation results, additional testing (skin biopsy), and the plan.    We decided to proceed with skin biopsy.    No new medications.    Next follow-up appointment is in the next 8-12 weeks or earlier if needed.    Total Time: 14 minutes spent on the date of the encounter doing chart review, history and exam, documentation and further activities per the note.    Jonny Perez MD  [facility] Neurology  (Chart documentation was completed in part with Dragon voice-recognition software. Even though reviewed, some grammatical, spelling, and word errors may remain.)      Again, thank you for allowing me to participate in the care of your patient.        Sincerely,        Jonny Perez MD    Electronically signed

## 2025-03-06 NOTE — NURSING NOTE
"Lizzie Chow is a 64 year old female who presents for:  Chief Complaint   Patient presents with    RECHECK     Review the emg, condition stable        Initial Vitals:  /86 (BP Location: Left arm, Patient Position: Sitting, Cuff Size: Adult Large)   Pulse 64   LMP 07/01/2013   SpO2 97%  Estimated body mass index is 33.09 kg/m  as calculated from the following:    Height as of 1/30/24: 1.676 m (5' 6\").    Weight as of 1/9/25: 93 kg (205 lb).. There is no height or weight on file to calculate BSA. BP completed using cuff size: ezequiel Soria    "
Detail Level: Generalized
Detail Level: Zone

## 2025-03-06 NOTE — PROGRESS NOTES
ESTABLISHED PATIENT NEUROLOGY NOTE    DATE OF VISIT: 3/6/2025  CLINIC LOCATION: [facility]  MRN: [id number]  PATIENT NAME: Lizzie Chow  YOB: 1960    REASON FOR VISIT:   Chief Complaint   Patient presents with    RECHECK     Review the emg, condition stable     SUBJECTIVE:                                                      HISTORY OF PRESENT ILLNESS: Patient is here to follow up regarding bilateral feet paresthesia and pain. Please refer to my initial note from 11/26/2024 for further information.    Since the last visit, the patient reports no changes.  Denies interval development of new focal neurological symptoms.    EMG from 2//2025 was normal without evidence of large fiber peripheral polyneuropathy or lumbosacral radiculopathy on either side.  Tabulated data from EMG report were personally reviewed and independently interpreted.  EXAM:                                                    Physical Exam:   Vitals: /86 (BP Location: Left arm, Patient Position: Sitting, Cuff Size: Adult Large)   Pulse 64   LMP 07/01/2013   SpO2 97%     General: pt is in NAD, cooperative.  Skin: normal turgor, moist mucous membranes, no lesions/rashes noticed.  HEENT: ATNC, white sclera, normal conjunctiva.  Respiratory: Symmetric lung excursion, no accessory respiratory muscle use.  Abdomen: Non distended.  Neurological: awake, cooperative, follows commands, no exam changes compared to the initial visit.  ASSESSMENT AND PLAN:                                                    Assessment: 64 year old female patient presents for follow-up of bilateral feet paresthesias and pain after completion of EMG, which was normal.  We discussed that large fiber peripheral polyneuropathy was excluded, but small fiber peripheral polyneuropathy remains a possibility.  We will proceed with skin biopsy for further evaluation.  We also reviewed nonspecific treatment options for neuropathy, but decided to  hold off until evaluation is completed.  Will also plan to check additional labs if skin biopsy is positive for small fiber peripheral polyneuropathy.    Diagnoses:    ICD-10-CM    1. Numbness and tingling of both feet  R20.0     R20.2       2. Bilateral foot pain  M79.671     M79.672         Plan: At today's visit we thoroughly discussed current symptoms, evaluation results, additional testing (skin biopsy), and the plan.    We decided to proceed with skin biopsy.    No new medications.    Next follow-up appointment is in the next 8-12 weeks or earlier if needed.    Total Time: 14 minutes spent on the date of the encounter doing chart review, history and exam, documentation and further activities per the note.    Jonny Perez MD  [facility] Neurology  (Chart documentation was completed in part with Dragon voice-recognition software. Even though reviewed, some grammatical, spelling, and word errors may remain.)

## 2025-03-13 ENCOUNTER — OFFICE VISIT (OUTPATIENT)
Dept: NEUROLOGY | Facility: CLINIC | Age: 65
End: 2025-03-13
Payer: COMMERCIAL

## 2025-03-13 DIAGNOSIS — M79.671 BILATERAL FOOT PAIN: ICD-10-CM

## 2025-03-13 DIAGNOSIS — R20.0 NUMBNESS AND TINGLING OF BOTH FEET: Primary | ICD-10-CM

## 2025-03-13 DIAGNOSIS — R20.2 NUMBNESS AND TINGLING OF BOTH FEET: Primary | ICD-10-CM

## 2025-03-13 DIAGNOSIS — M79.672 BILATERAL FOOT PAIN: ICD-10-CM

## 2025-03-13 NOTE — LETTER
3/13/2025      Lizzie Chow  [address] [place] [address]      Dear Colleague,    Thank you for referring your patient, Lizzie Chow, to the [facility] NEUROLOGY [facility]. Please see a copy of my visit note below.    Procedure Note: Neurodiagnostic skin biopsy  Referral: Evaluation for small fiber neuropathy. Referring provider: Dr. Perez  ?Reason for biopsy: Tingling pain and numbness in bilateral feet. Eval for small fiber neuropathy.   Written informed consent was obtained.   ?   The standard sites on the left lateral leg 10cm proximal to lateral malleolus and left medial thigh were sterilely prepped. Xylocaine 1% was administered by subdermal injection. A total of 8 mL was used. A 3 mm punch biopsy was removed from each site. The specimens were placed in fixative. The specimen from the left lateral leg was placed in a vial labeled as left leg and the specimen from the left medial thigh was labeled as left thigh. The presence of the specimen in the vial was verified by two individuals. After verification the samples were sent to the neuropathology laboratory for analysis. The biopsy sites were dressed with a pressure bandage. Estimated blood loss was less than 10 drops total. The patient was informed about post-procedure bandage and biopsy site care. Patient was advised that it might take up to 4 weeks for results of the test to be available. No complications.      Dia Morejon PA-C  Department of Neurology      Again, thank you for allowing me to participate in the care of your patient.        Sincerely,        Dia Morejon PA-C    Electronically signed

## 2025-03-13 NOTE — PROGRESS NOTES
Procedure Note: Neurodiagnostic skin biopsy  Referral: Evaluation for small fiber neuropathy. Referring provider: Dr. Perez  ?Reason for biopsy: Tingling pain and numbness in bilateral feet. Eval for small fiber neuropathy.   Written informed consent was obtained.   ?   The standard sites on the left lateral leg 10cm proximal to lateral malleolus and left medial thigh were sterilely prepped. Xylocaine 1% was administered by subdermal injection. A total of 8 mL was used. A 3 mm punch biopsy was removed from each site. The specimens were placed in fixative. The specimen from the left lateral leg was placed in a vial labeled as left leg and the specimen from the left medial thigh was labeled as left thigh. The presence of the specimen in the vial was verified by two individuals. After verification the samples were sent to the neuropathology laboratory for analysis. The biopsy sites were dressed with a pressure bandage. Estimated blood loss was less than 10 drops total. The patient was informed about post-procedure bandage and biopsy site care. Patient was advised that it might take up to 4 weeks for results of the test to be available. No complications.      Dia Morejon PA-C  Department of Neurology

## 2025-03-23 DIAGNOSIS — E03.9 ACQUIRED HYPOTHYROIDISM: ICD-10-CM

## 2025-03-24 RX ORDER — LEVOTHYROXINE SODIUM 50 UG/1
TABLET ORAL
Qty: 90 TABLET | Refills: 0 | Status: SHIPPED | OUTPATIENT
Start: 2025-03-24

## 2025-04-09 LAB — SCANNED LAB RESULT: ABNORMAL

## 2025-05-03 ENCOUNTER — HEALTH MAINTENANCE LETTER (OUTPATIENT)
Age: 65
End: 2025-05-03

## 2025-05-06 ENCOUNTER — OFFICE VISIT (OUTPATIENT)
Dept: NEUROLOGY | Facility: CLINIC | Age: 65
End: 2025-05-06
Payer: COMMERCIAL

## 2025-05-06 VITALS — HEART RATE: 69 BPM | DIASTOLIC BLOOD PRESSURE: 78 MMHG | SYSTOLIC BLOOD PRESSURE: 116 MMHG | OXYGEN SATURATION: 95 %

## 2025-05-06 DIAGNOSIS — G62.9 SMALL FIBER NEUROPATHY: Primary | ICD-10-CM

## 2025-05-06 PROCEDURE — 99213 OFFICE O/P EST LOW 20 MIN: CPT | Performed by: PSYCHIATRY & NEUROLOGY

## 2025-05-06 PROCEDURE — 3074F SYST BP LT 130 MM HG: CPT | Performed by: PSYCHIATRY & NEUROLOGY

## 2025-05-06 PROCEDURE — 3078F DIAST BP <80 MM HG: CPT | Performed by: PSYCHIATRY & NEUROLOGY

## 2025-05-06 NOTE — PATIENT INSTRUCTIONS
AFTER VISIT SUMMARY (AVS):    At today's visit we thoroughly discussed results of skin biopsy, consistent with small fiber peripheral polyneuropathy, necessary evaluation, and the plan, which includes:  Orders Placed This Encounter   Procedures    Vitamin E    Vitamin B6    Vitamin B12    Vitamin B1 whole blood    Protein immunofixation urine    Protein Immunofixation Serum    Protein electrophoresis random urine    Methylmalonic Acid    Hemoglobin A1c    Erythrocyte sedimentation rate auto    Anti Nuclear Nydia IgG by IFA with Reflex    Protein electrophoresis     No new medications.     Additional recommendations after the work-up.    Next follow-up appointment is on 6/11/2025 or sooner if needed.    Please do not hesitate to call me with any questions or concerns.    Thanks.

## 2025-05-06 NOTE — PROGRESS NOTES
ESTABLISHED PATIENT NEUROLOGY NOTE    DATE OF VISIT: 5/6/2025  CLINIC LOCATION: [facility]  MRN: [id number]  PATIENT NAME: Lizzie Chow  YOB: 1960    REASON FOR VISIT:   Chief Complaint   Patient presents with    Follow Up     SUBJECTIVE:                                                      HISTORY OF PRESENT ILLNESS: Patient is here to follow up regarding bilateral feet paresthesia and pain.  The last visit was on 3/6/2025.  Please refer to my initial/other prior notes for further information.    Since the last visit, the patient reports no changes in her symptoms.  She denies interval development of new focal neurological symptoms.    Skin biopsy from 3/13/2025 demonstrated absent epidural nerve fibers in the left distal leg and reduced density in the left thigh, felt consistent with small fiber neuropathy in appropriate clinical context.  EXAM:                                                    Physical Exam:   Vitals: /78   Pulse 69   LMP 07/01/2013   SpO2 95%     General: pt is in NAD, cooperative.  Skin: normal turgor, moist mucous membranes, no lesions/rashes noticed.  HEENT: ATNC, white sclera, normal conjunctiva.  Respiratory: Symmetric lung excursion, no accessory respiratory muscle use.  Abdomen: Non distended.  Neurological: awake, cooperative, follows commands, equally moves all extremities, Achilles reflexes are mildly reduced, pinprick/vibration reduced in distal lower extremities in both feet, no dysmetria bilaterally.  ASSESSMENT AND PLAN:                                                    Assessment: 64 year old female patient presents for follow-up of bilateral feet paresthesia and pain after completion skin biopsy that was consistent with small fiber peripheral polyneuropathy, most likely related to diabetes, but additional etiologies are possible.  We reviewed the diagnosis, additionally needed workup, and the plan, as summarized below.    Diagnoses:     ICD-10-CM    1. Small fiber neuropathy  G62.9         Plan: At today's visit we thoroughly discussed results of skin biopsy, consistent with small fiber peripheral polyneuropathy, necessary evaluation, and the plan, which includes:  Orders Placed This Encounter   Procedures    Vitamin E    Vitamin B6    Vitamin B12    Vitamin B1 whole blood    Protein immunofixation urine    Protein Immunofixation Serum    Protein electrophoresis random urine    Methylmalonic Acid    Hemoglobin A1c    Erythrocyte sedimentation rate auto    Anti Nuclear Nydia IgG by IFA with Reflex    Protein electrophoresis     No new medications.     Additional recommendations after the work-up.    Next follow-up appointment is on 6/11/2025 or sooner if needed.    Total Time: 23 minutes spent on the date of the encounter doing chart review, history and exam, documentation and further activities per the note.    Jonny Perez MD  [facility] Neurology  (Chart documentation was completed in part with Dragon voice-recognition software. Even though reviewed, some grammatical, spelling, and word errors may remain.)

## 2025-05-06 NOTE — LETTER
5/6/2025      Lizzie Chow  [address] [place] [address]      Dear Colleague,    Thank you for referring your patient, Lizzie Chow, to the [facility] NEUROLOGY CLINICS [facility]. Please see a copy of my visit note below.    ESTABLISHED PATIENT NEUROLOGY NOTE    DATE OF VISIT: 5/6/2025  CLINIC LOCATION: [facility]  MRN: [id number]  PATIENT NAME: Lizzie Chow  YOB: 1960    REASON FOR VISIT:   Chief Complaint   Patient presents with     Follow Up     SUBJECTIVE:                                                      HISTORY OF PRESENT ILLNESS: Patient is here to follow up regarding bilateral feet paresthesia and pain.  The last visit was on 3/6/2025.  Please refer to my initial/other prior notes for further information.    Since the last visit, the patient reports no changes in her symptoms.  She denies interval development of new focal neurological symptoms.    Skin biopsy from 3/13/2025 demonstrated absent epidural nerve fibers in the left distal leg and reduced density in the left thigh, felt consistent with small fiber neuropathy in appropriate clinical context.  EXAM:                                                    Physical Exam:   Vitals: /78   Pulse 69   LMP 07/01/2013   SpO2 95%     General: pt is in NAD, cooperative.  Skin: normal turgor, moist mucous membranes, no lesions/rashes noticed.  HEENT: ATNC, white sclera, normal conjunctiva.  Respiratory: Symmetric lung excursion, no accessory respiratory muscle use.  Abdomen: Non distended.  Neurological: awake, cooperative, follows commands, equally moves all extremities, Achilles reflexes are mildly reduced, pinprick/vibration reduced in distal lower extremities in both feet, no dysmetria bilaterally.  ASSESSMENT AND PLAN:                                                    Assessment: 64 year old female patient presents for follow-up of bilateral feet paresthesia and pain after completion skin biopsy  that was consistent with small fiber peripheral polyneuropathy.  We reviewed the diagnosis, additionally needed workup, and the plan, as summarized below.    Diagnoses:    ICD-10-CM    1. Small fiber neuropathy  G62.9         Plan: At today's visit we thoroughly discussed results of skin biopsy, consistent with small fiber peripheral polyneuropathy, necessary evaluation, and the plan, which includes:  Orders Placed This Encounter   Procedures     Vitamin E     Vitamin B6     Vitamin B12     Vitamin B1 whole blood     Protein immunofixation urine     Protein Immunofixation Serum     Protein electrophoresis random urine     Methylmalonic Acid     Hemoglobin A1c     Erythrocyte sedimentation rate auto     Anti Nuclear Nydia IgG by IFA with Reflex     Protein electrophoresis     No new medications.     Additional recommendations after the work-up.    Next follow-up appointment is on 6/11/2025 or sooner if needed.    Total Time: 23 minutes spent on the date of the encounter doing chart review, history and exam, documentation and further activities per the note.    Jonny Perez MD  [facility] Neurology  (Chart documentation was completed in part with Dragon voice-recognition software. Even though reviewed, some grammatical, spelling, and word errors may remain.)      Again, thank you for allowing me to participate in the care of your patient.        Sincerely,        Jonny Perez MD    Electronically signed

## 2025-05-06 NOTE — PROGRESS NOTES
ESTABLISHED PATIENT NEUROLOGY NOTE    DATE OF VISIT: 5/6/2025  CLINIC LOCATION: [facility]  MRN: [id number]  PATIENT NAME: Lizzie Chow  YOB: 1960    REASON FOR VISIT: No chief complaint on file.    SUBJECTIVE:                                                      HISTORY OF PRESENT ILLNESS: Patient is here to follow up regarding bilateral feet paresthesia and pain.  The last visit was on 3/6/2025.  Please refer to my initial/other prior notes for further information.    Since the last visit, the patient reports ***.  She denies interval development of new focal neurological symptoms.    Skin biopsy from 3/13/2025 demonstrated reduced epidural nerve density fibers in the left distal leg and left thigh, felt consistent with small fiber neuropathy in appropriate clinical context.  EXAM:                                                    Physical Exam:   Vitals: LMP 07/01/2013     General: pt is in NAD, cooperative.  Skin: normal turgor, moist mucous membranes, no lesions/rashes noticed.  HEENT: ATNC, white sclera, normal conjunctiva.  Respiratory: Symmetric lung excursion, no accessory respiratory muscle use.  Abdomen: Non distended.  Neurological: awake, cooperative, follows commands, equally moves all extremities, Achilles reflexes are mildly reduced, pinprick/vibration reduced in distal lower extremities in both feet, no dysmetria bilaterally.  ASSESSMENT AND PLAN:                                                    Assessment: 64 year old female patient presents for follow-up of bilateral feet paresthesia and pain after completion skin biopsy that was consistent with small fiber peripheral polyneuropathy.    Diagnoses:    ICD-10-CM    1. Small fiber neuropathy  G62.9         Plan:  There are no Patient Instructions on file for this visit.    Total Time: *** minutes spent on the date of the encounter doing chart review, history and exam, documentation and further activities per the  note.    Jonny Perez MD  [facility] Neurology  (Chart documentation was completed in part with Dragon voice-recognition software. Even though reviewed, some grammatical, spelling, and word errors may remain.)

## 2025-05-22 ENCOUNTER — LAB (OUTPATIENT)
Dept: LAB | Facility: CLINIC | Age: 65
End: 2025-05-22
Payer: COMMERCIAL

## 2025-05-22 DIAGNOSIS — G62.9 SMALL FIBER NEUROPATHY: ICD-10-CM

## 2025-05-22 LAB
ERYTHROCYTE [SEDIMENTATION RATE] IN BLOOD BY WESTERGREN METHOD: 26 MM/HR (ref 0–30)
EST. AVERAGE GLUCOSE BLD GHB EST-MCNC: 134 MG/DL
HBA1C MFR BLD: 6.3 % (ref 0–5.6)

## 2025-05-25 LAB
A-TOCOPHEROL VIT E SERPL-MCNC: 6.9 MG/L
BETA+GAMMA TOCOPHEROL SERPL-MCNC: 2.5 MG/L

## 2025-05-26 LAB — VIT B1 PYROPHOSHATE BLD-SCNC: 154 NMOL/L

## 2025-05-28 LAB — PYRIDOXAL PHOS SERPL-SCNC: 24.6 NMOL/L

## 2025-05-29 ENCOUNTER — RESULTS FOLLOW-UP (OUTPATIENT)
Dept: NEUROLOGY | Facility: CLINIC | Age: 65
End: 2025-05-29

## 2025-05-29 LAB — METHYLMALONATE SERPL-SCNC: 0.2 UMOL/L (ref 0–0.4)

## 2025-05-30 SDOH — HEALTH STABILITY: PHYSICAL HEALTH: ON AVERAGE, HOW MANY MINUTES DO YOU ENGAGE IN EXERCISE AT THIS LEVEL?: 30 MIN

## 2025-05-30 SDOH — HEALTH STABILITY: PHYSICAL HEALTH: ON AVERAGE, HOW MANY DAYS PER WEEK DO YOU ENGAGE IN MODERATE TO STRENUOUS EXERCISE (LIKE A BRISK WALK)?: 4 DAYS

## 2025-05-30 ASSESSMENT — SOCIAL DETERMINANTS OF HEALTH (SDOH): HOW OFTEN DO YOU GET TOGETHER WITH FRIENDS OR RELATIVES?: ONCE A WEEK

## 2025-06-02 ENCOUNTER — OFFICE VISIT (OUTPATIENT)
Dept: FAMILY MEDICINE | Facility: CLINIC | Age: 65
End: 2025-06-02
Payer: COMMERCIAL

## 2025-06-02 ENCOUNTER — TELEPHONE (OUTPATIENT)
Dept: FAMILY MEDICINE | Facility: CLINIC | Age: 65
End: 2025-06-02

## 2025-06-02 VITALS
WEIGHT: 209.2 LBS | BODY MASS INDEX: 34.85 KG/M2 | HEART RATE: 67 BPM | TEMPERATURE: 98.1 F | RESPIRATION RATE: 16 BRPM | HEIGHT: 65 IN | DIASTOLIC BLOOD PRESSURE: 85 MMHG | OXYGEN SATURATION: 98 % | SYSTOLIC BLOOD PRESSURE: 139 MMHG

## 2025-06-02 DIAGNOSIS — I25.118 CORONARY ARTERY DISEASE OF NATIVE HEART WITH STABLE ANGINA PECTORIS, UNSPECIFIED VESSEL OR LESION TYPE: ICD-10-CM

## 2025-06-02 DIAGNOSIS — Z12.31 ENCOUNTER FOR SCREENING MAMMOGRAM FOR BREAST CANCER: ICD-10-CM

## 2025-06-02 DIAGNOSIS — Z87.891 HISTORY OF TOBACCO USE, PRESENTING HAZARDS TO HEALTH: ICD-10-CM

## 2025-06-02 DIAGNOSIS — I10 BENIGN ESSENTIAL HYPERTENSION: ICD-10-CM

## 2025-06-02 DIAGNOSIS — Z00.00 ROUTINE GENERAL MEDICAL EXAMINATION AT A HEALTH CARE FACILITY: Primary | ICD-10-CM

## 2025-06-02 DIAGNOSIS — E03.9 ACQUIRED HYPOTHYROIDISM: ICD-10-CM

## 2025-06-02 DIAGNOSIS — E11.42 TYPE 2 DIABETES MELLITUS WITH DIABETIC POLYNEUROPATHY, WITHOUT LONG-TERM CURRENT USE OF INSULIN (H): ICD-10-CM

## 2025-06-02 DIAGNOSIS — E78.2 MIXED HYPERLIPIDEMIA: ICD-10-CM

## 2025-06-02 PROBLEM — Z12.4 CERVICAL CANCER SCREENING: Status: RESOLVED | Noted: 2022-01-06 | Resolved: 2025-06-02

## 2025-06-02 PROBLEM — E66.01 MORBID OBESITY (H): Status: RESOLVED | Noted: 2018-09-12 | Resolved: 2025-06-02

## 2025-06-02 PROBLEM — R20.0 NUMBNESS OF TOES: Status: RESOLVED | Noted: 2024-08-30 | Resolved: 2025-06-02

## 2025-06-02 LAB
ALBUMIN SERPL BCG-MCNC: 4.1 G/DL (ref 3.5–5.2)
ALP SERPL-CCNC: 114 U/L (ref 40–150)
ALT SERPL W P-5'-P-CCNC: 30 U/L (ref 0–50)
ANION GAP SERPL CALCULATED.3IONS-SCNC: 13 MMOL/L (ref 7–15)
AST SERPL W P-5'-P-CCNC: 48 U/L (ref 0–45)
BILIRUB SERPL-MCNC: 0.5 MG/DL
BUN SERPL-MCNC: 11.3 MG/DL (ref 8–23)
CALCIUM SERPL-MCNC: 9.5 MG/DL (ref 8.8–10.4)
CHLORIDE SERPL-SCNC: 101 MMOL/L (ref 98–107)
CHOLEST SERPL-MCNC: 148 MG/DL
CREAT SERPL-MCNC: 0.63 MG/DL (ref 0.51–0.95)
CREAT UR-MCNC: 129 MG/DL
EGFRCR SERPLBLD CKD-EPI 2021: >90 ML/MIN/1.73M2
ERYTHROCYTE [DISTWIDTH] IN BLOOD BY AUTOMATED COUNT: 12.7 % (ref 10–15)
FASTING STATUS PATIENT QL REPORTED: ABNORMAL
FASTING STATUS PATIENT QL REPORTED: NORMAL
GLUCOSE SERPL-MCNC: 112 MG/DL (ref 70–99)
HCO3 SERPL-SCNC: 26 MMOL/L (ref 22–29)
HCT VFR BLD AUTO: 39.2 % (ref 35–47)
HDLC SERPL-MCNC: 58 MG/DL
HGB BLD-MCNC: 12.9 G/DL (ref 11.7–15.7)
LDLC SERPL CALC-MCNC: 64 MG/DL
MCH RBC QN AUTO: 30.8 PG (ref 26.5–33)
MCHC RBC AUTO-ENTMCNC: 32.9 G/DL (ref 31.5–36.5)
MCV RBC AUTO: 94 FL (ref 78–100)
MICROALBUMIN UR-MCNC: <12 MG/L
MICROALBUMIN/CREAT UR: NORMAL MG/G{CREAT}
NONHDLC SERPL-MCNC: 90 MG/DL
PLATELET # BLD AUTO: 294 10E3/UL (ref 150–450)
POTASSIUM SERPL-SCNC: 4.3 MMOL/L (ref 3.4–5.3)
PROT SERPL-MCNC: 7.4 G/DL (ref 6.4–8.3)
RBC # BLD AUTO: 4.19 10E6/UL (ref 3.8–5.2)
SODIUM SERPL-SCNC: 140 MMOL/L (ref 135–145)
T4 FREE SERPL-MCNC: 1.15 NG/DL (ref 0.9–1.7)
TRIGL SERPL-MCNC: 129 MG/DL
TSH SERPL DL<=0.005 MIU/L-ACNC: 3.95 UIU/ML (ref 0.3–4.2)
WBC # BLD AUTO: 7.3 10E3/UL (ref 4–11)

## 2025-06-02 PROCEDURE — 82570 ASSAY OF URINE CREATININE: CPT | Performed by: FAMILY MEDICINE

## 2025-06-02 PROCEDURE — 99214 OFFICE O/P EST MOD 30 MIN: CPT | Mod: 25 | Performed by: FAMILY MEDICINE

## 2025-06-02 PROCEDURE — 80061 LIPID PANEL: CPT | Performed by: FAMILY MEDICINE

## 2025-06-02 PROCEDURE — G2211 COMPLEX E/M VISIT ADD ON: HCPCS | Performed by: FAMILY MEDICINE

## 2025-06-02 PROCEDURE — 3075F SYST BP GE 130 - 139MM HG: CPT | Performed by: FAMILY MEDICINE

## 2025-06-02 PROCEDURE — 36415 COLL VENOUS BLD VENIPUNCTURE: CPT | Performed by: FAMILY MEDICINE

## 2025-06-02 PROCEDURE — 84439 ASSAY OF FREE THYROXINE: CPT | Performed by: FAMILY MEDICINE

## 2025-06-02 PROCEDURE — 3079F DIAST BP 80-89 MM HG: CPT | Performed by: FAMILY MEDICINE

## 2025-06-02 PROCEDURE — 82043 UR ALBUMIN QUANTITATIVE: CPT | Performed by: FAMILY MEDICINE

## 2025-06-02 PROCEDURE — 84443 ASSAY THYROID STIM HORMONE: CPT | Performed by: FAMILY MEDICINE

## 2025-06-02 PROCEDURE — 99396 PREV VISIT EST AGE 40-64: CPT | Performed by: FAMILY MEDICINE

## 2025-06-02 PROCEDURE — 85027 COMPLETE CBC AUTOMATED: CPT | Performed by: FAMILY MEDICINE

## 2025-06-02 PROCEDURE — 80053 COMPREHEN METABOLIC PANEL: CPT | Performed by: FAMILY MEDICINE

## 2025-06-02 RX ORDER — CARVEDILOL 6.25 MG/1
6.25 TABLET ORAL 2 TIMES DAILY
Qty: 180 TABLET | Refills: 3 | Status: SHIPPED | OUTPATIENT
Start: 2025-06-02

## 2025-06-02 RX ORDER — LISINOPRIL 40 MG/1
40 TABLET ORAL DAILY
Qty: 90 TABLET | Refills: 3 | Status: SHIPPED | OUTPATIENT
Start: 2025-06-02

## 2025-06-02 RX ORDER — LANCETS
EACH MISCELLANEOUS
Qty: 100 EACH | Refills: 3 | Status: SHIPPED | OUTPATIENT
Start: 2025-06-02

## 2025-06-02 RX ORDER — NITROGLYCERIN 0.4 MG/1
0.4 TABLET SUBLINGUAL EVERY 5 MIN PRN
Qty: 25 TABLET | Refills: 1 | Status: SHIPPED | OUTPATIENT
Start: 2025-06-02

## 2025-06-02 RX ORDER — LEVOTHYROXINE SODIUM 50 UG/1
TABLET ORAL
Qty: 90 TABLET | Refills: 3 | Status: SHIPPED | OUTPATIENT
Start: 2025-06-02

## 2025-06-02 RX ORDER — LEVOTHYROXINE SODIUM 25 UG/1
TABLET ORAL
Qty: 90 TABLET | Refills: 3 | Status: SHIPPED | OUTPATIENT
Start: 2025-06-02

## 2025-06-02 RX ORDER — LISINOPRIL 20 MG/1
20 TABLET ORAL DAILY
Qty: 90 TABLET | Refills: 3 | Status: SHIPPED | OUTPATIENT
Start: 2025-06-02 | End: 2025-06-02

## 2025-06-02 RX ORDER — LEVOTHYROXINE SODIUM 25 UG/1
TABLET ORAL
Qty: 90 TABLET | Refills: 3 | Status: SHIPPED | OUTPATIENT
Start: 2025-06-02 | End: 2025-06-02

## 2025-06-02 RX ORDER — ATORVASTATIN CALCIUM 80 MG/1
80 TABLET, FILM COATED ORAL DAILY
Qty: 90 TABLET | Refills: 3 | Status: SHIPPED | OUTPATIENT
Start: 2025-06-02

## 2025-06-02 NOTE — PATIENT INSTRUCTIONS
Patient Education   Preventive Care Advice   This is general advice given by our system to help you stay healthy. However, your care team may have specific advice just for you. Please talk to your care team about your preventive care needs.  Nutrition  Eat 5 or more servings of fruits and vegetables each day.  Try wheat bread, brown rice and whole grain pasta (instead of white bread, rice, and pasta).  Get enough calcium and vitamin D. Check the label on foods and aim for 100% of the RDA (recommended daily allowance).  Lifestyle  Exercise at least 150 minutes each week  (30 minutes a day, 5 days a week).  Do muscle strengthening activities 2 days a week. These help control your weight and prevent disease.  No smoking.  Wear sunscreen to prevent skin cancer.  Have a dental exam and cleaning every 6 months.  Yearly exams  See your health care team every year to talk about:  Any changes in your health.  Any medicines your care team has prescribed.  Preventive care, family planning, and ways to prevent chronic diseases.  Shots (vaccines)   HPV shots (up to age 26), if you've never had them before.  Hepatitis B shots (up to age 59), if you've never had them before.  COVID-19 shot: Get this shot when it's due.  Flu shot: Get a flu shot every year.  Tetanus shot: Get a tetanus shot every 10 years.  Pneumococcal, hepatitis A, and RSV shots: Ask your care team if you need these based on your risk.  Shingles shot (for age 50 and up)  General health tests  Diabetes screening:  Starting at age 35, Get screened for diabetes at least every 3 years.  If you are younger than age 35, ask your care team if you should be screened for diabetes.  Cholesterol test: At age 39, start having a cholesterol test every 5 years, or more often if advised.  Bone density scan (DEXA): At age 50, ask your care team if you should have this scan for osteoporosis (brittle bones).  Hepatitis C: Get tested at least once in your life.  STIs (sexually  transmitted infections)  Before age 24: Ask your care team if you should be screened for STIs.  After age 24: Get screened for STIs if you're at risk. You are at risk for STIs (including HIV) if:  You are sexually active with more than one person.  You don't use condoms every time.  You or a partner was diagnosed with a sexually transmitted infection.  If you are at risk for HIV, ask about PrEP medicine to prevent HIV.  Get tested for HIV at least once in your life, whether you are at risk for HIV or not.  Cancer screening tests  Cervical cancer screening: If you have a cervix, begin getting regular cervical cancer screening tests starting at age 21.  Breast cancer scan (mammogram): If you've ever had breasts, begin having regular mammograms starting at age 40. This is a scan to check for breast cancer.  Colon cancer screening: It is important to start screening for colon cancer at age 45.  Have a colonoscopy test every 10 years (or more often if you're at risk) Or, ask your provider about stool tests like a FIT test every year or Cologuard test every 3 years.  To learn more about your testing options, visit:   .  For help making a decision, visit:   https://bit.ly/ox00591.  Prostate cancer screening test: If you have a prostate, ask your care team if a prostate cancer screening test (PSA) at age 55 is right for you.  Lung cancer screening: If you are a current or former smoker ages 50 to 80, ask your care team if ongoing lung cancer screenings are right for you.  For informational purposes only. Not to replace the advice of your health care provider. Copyright   2023 [facility]. All rights reserved. Clinically reviewed by the [facility]. [employer] 290791 - REV 01/24.  Preventing Falls: Care Instructions  Injuries and health problems such as trouble walking or poor eyesight can increase your risk of falling. So can some medicines. But there are things you can do to help  "prevent falls. You can exercise to get stronger. You can also arrange your home to make it safer.    Talk to your doctor about the medicines you take. Ask if any of them increase the risk of falls and whether they can be changed or stopped.   Try to exercise regularly. It can help improve your strength and balance. This can help lower your risk of falling.         Practice fall safety and prevention.   Wear low-heeled shoes that fit well and give your feet good support. Talk to your doctor if you have foot problems that make this hard.  Carry a cellphone or wear a medical alert device that you can use to call for help.  Use stepladders instead of chairs to reach high objects. Don't climb if you're at risk for falls. Ask for help, if needed.  Wear the correct eyeglasses, if you need them.        Make your home safer.   Remove rugs, cords, clutter, and furniture from walkways.  Keep your house well lit. Use night-lights in hallways and bathrooms.  Install and use sturdy handrails on stairways.  Wear nonskid footwear, even inside. Don't walk barefoot or in socks without shoes.        Be safe outside.   Use handrails, curb cuts, and ramps whenever possible.  Keep your hands free by using a shoulder bag or backpack.  Try to walk in well-lit areas. Watch out for uneven ground, changes in pavement, and debris.  Be careful in the winter. Walk on the grass or gravel when sidewalks are slippery. Use de-icer on steps and walkways. Add non-slip devices to shoes.    Put grab bars and nonskid mats in your shower or tub and near the toilet. Try to use a shower chair or bath bench when bathing.   Get into a tub or shower by putting in your weaker leg first. Get out with your strong side first. Have a phone or medical alert device in the bathroom with you.   Where can you learn more?  Go to https://www.[employer].net/patiented  Enter G117 in the search box to learn more about \"Preventing Falls: Care Instructions.\"  Current as of: " July 31, 2024  Content Version: 14.4    2238-0677 [employer].   Care instructions adapted under license by your healthcare professional. If you have questions about a medical condition or this instruction, always ask your healthcare professional. [employer] disclaims any warranty or liability for your use of this information.    Learning About Stress  What is stress?     Stress is your body's response to a hard situation. Your body can have a physical, emotional, or mental response. Stress is a fact of life for most people, and it affects everyone differently. What causes stress for you may not be stressful for someone else.  A lot of things can cause stress. You may feel stress when you go on a job interview, take a test, or run a race. This kind of short-term stress is normal and even useful. It can help you if you need to work hard or react quickly. For example, stress can help you finish an important job on time.  Long-term stress is caused by ongoing stressful situations or events. Examples of long-term stress include long-term health problems, ongoing problems at work, or conflicts in your family. Long-term stress can harm your health.  How does stress affect your health?  When you are stressed, your body responds as though you are in danger. It makes hormones that speed up your heart, make you breathe faster, and give you a burst of energy. This is called the fight-or-flight stress response. If the stress is over quickly, your body goes back to normal and no harm is done.  But if stress happens too often or lasts too long, it can have bad effects. Long-term stress can make you more likely to get sick, and it can make symptoms of some diseases worse. If you tense up when you are stressed, you may develop neck, shoulder, or low back pain. Stress is linked to high blood pressure and heart disease.  Stress also harms your emotional health. It can make you camacho, tense, or depressed. Your  relationships may suffer, and you may not do well at work or school.  What can you do to manage stress?  You can try these things to help manage stress:   Do something active. Exercise or activity can help reduce stress. Walking is a great way to get started. Even everyday activities such as housecleaning or yard work can help.  Try yoga or ann chi. These techniques combine exercise and meditation. You may need some training at first to learn them.  Do something you enjoy. For example, listen to music or go to a movie. Practice your hobby or do volunteer work.  Meditate. This can help you relax, because you are not worrying about what happened before or what may happen in the future.  Do guided imagery. Imagine yourself in any setting that helps you feel calm. You can use online videos, books, or a teacher to guide you.  Do breathing exercises. For example:  From a standing position, bend forward from the waist with your knees slightly bent. Let your arms dangle close to the floor.  Breathe in slowly and deeply as you return to a standing position. Roll up slowly and lift your head last.  Hold your breath for just a few seconds in the standing position.  Breathe out slowly and bend forward from the waist.  Let your feelings out. Talk, laugh, cry, and express anger when you need to. Talking with supportive friends or family, a counselor, or a jae leader about your feelings is a healthy way to relieve stress. Avoid discussing your feelings with people who make you feel worse.  Write. It may help to write about things that are bothering you. This helps you find out how much stress you feel and what is causing it. When you know this, you can find better ways to cope.  What can you do to prevent stress?  You might try some of these things to help prevent stress:  Manage your time. This helps you find time to do the things you want and need to do.  Get enough sleep. Your body recovers from the stresses of the day while  "you are sleeping.  Get support. Your family, friends, and community can make a difference in how you experience stress.  Limit your news feed. Avoid or limit time on social media or news that may make you feel stressed.  Do something active. Exercise or activity can help reduce stress. Walking is a great way to get started.  Where can you learn more?  Go to https://www.[employer].net/patiented  Enter N032 in the search box to learn more about \"Learning About Stress.\"  Current as of: October 24, 2024  Content Version: 14.4    4525-5803 [employer].   Care instructions adapted under license by your healthcare professional. If you have questions about a medical condition or this instruction, always ask your healthcare professional. [employer] disclaims any warranty or liability for your use of this information.    9 Ways to Cut Back on Drinking  Maybe you've found yourself drinking more alcohol than you'd prefer. If you want to cut back, here are some ideas to try.    Think before you drink.  Do you really want a drink, or is it just a habit? If you're used to having a drink at a certain time, try doing something else then.     Look for substitutes.  Find some no-alcohol drinks that you enjoy, like flavored seltzer water, tea with honey, or tonic with a slice of lime. Or try alcohol-free beer or \"virgin\" cocktails (without the alcohol).     Drink more water.  Use water to quench your thirst. Drink a glass of water before you have any alcohol. Have another glass along with every drink or between drinks.     Shrink your drink.  For example, have a bottle of beer instead of a pint. Use a smaller glass for wine. Choose drinks with lower alcohol content (ABV%). Or use less liquor and more mixer in cocktails.     Slow down.  It's easy to drink quickly and without thinking about it. Pay attention, and make each drink last longer.     Do the math.  Total up how much you spend on alcohol each month. How " "much is that a year? If you cut back, what could you do with the money you save?     Take a break.  Choose a day or two each week when you won't drink at all. Notice how you feel on those days, physically and emotionally. How did you sleep? Do you feel better? Over time, add more break days.     Count calories.  Would you like to lose some weight? For some people that's a good motivator for cutting back. Figure out how many calories are in each drink. How many does that add up to in a day? In a week? In a month?     Practice saying no.  Be ready when someone offers you a drink. Try: \"Thanks, I've had enough.\" Or \"Thanks, but I'm cutting back.\" Or \"No, thanks. I feel better when I drink less.\"   Current as of: August 20, 2024  Content Version: 14.4    4622-3887 [employer].   Care instructions adapted under license by your healthcare professional. If you have questions about a medical condition or this instruction, always ask your healthcare professional. [employer] disclaims any warranty or liability for your use of this information.     "

## 2025-06-02 NOTE — PROGRESS NOTES
Preventive Care Visit  [facility]  Gladys Solis MD, Family Medicine  Jun 2, 2025      Assessment & Plan     Routine general medical examination at a health care facility  - CBC with platelets; Future  - CBC with platelets    History of tobacco use, presenting hazards to health  - CT Chest Lung Cancer Screen Low Dose Without; Future    Type 2 diabetes mellitus with diabetic polyneuropathy, without long-term current use of insulin (H) - surveillance labs today  - Albumin Random Urine Quantitative with Creat Ratio; Future  - blood glucose (NO BRAND SPECIFIED) test strip; Use to test blood sugar 2 times daily or as directed. To accompany: Blood Glucose Monitor Brands: per insurance.  - thin (NO BRAND SPECIFIED) lancets; Use with lanceting device. To accompany: Blood Glucose Monitor Brands: per insurance.  - Albumin Random Urine Quantitative with Creat Ratio    Mixed hyperlipidemia - on statin, continue, surveillance labs today  - atorvastatin (LIPITOR) 80 MG tablet; Take 1 tablet (80 mg) by mouth daily.  - Lipid panel reflex to direct LDL Fasting; Future  - Comprehensive metabolic panel; Future  - Lipid panel reflex to direct LDL Fasting  - Comprehensive metabolic panel    Coronary artery disease of native heart with stable angina pectoris, unspecified vessel or lesion type - stable, refills  - carvedilol (COREG) 6.25 MG tablet; Take 1 tablet (6.25 mg) by mouth 2 times daily.  - nitroGLYcerin (NITROSTAT) 0.4 MG sublingual tablet; Place 1 tablet (0.4 mg) under the tongue every 5 minutes as needed for chest pain.    Acquired hypothyroidism - surveillance labs today, refills  - levothyroxine (SYNTHROID/LEVOTHROID) 25 MCG tablet; Take 3 days of the week  - levothyroxine (SYNTHROID/LEVOTHROID) 50 MCG tablet; TAKE 1 TABLET BY MOUTH 4 DAYS PER WEEK  - TSH; Future  - T4, free; Future  - TSH  - T4, free    Benign essential hypertension - upper limits of normal, seeing similar at home. Will  "increase lisinopril to 40 mg daily.   - lisinopril (ZESTRIL) 40 MG tablet; Take 1 tablet (40 mg) by mouth daily.  - Comprehensive metabolic panel    Encounter for screening mammogram for breast cancer   - MA Screen Bilateral w/Tj; Future    The longitudinal plan of care for the diagnosis(es)/condition(s) as documented were addressed during this visit. Due to the added complexity in care, I will continue to support Lizzie in the subsequent management and with ongoing continuity of care.    BMI  Estimated body mass index is 34.81 kg/m  as calculated from the following:    Height as of this encounter: 1.651 m (5' 5\").    Weight as of this encounter: 94.9 kg (209 lb 3.2 oz).       Counseling  Appropriate preventive services were addressed with this patient via screening, questionnaire, or discussion as appropriate for fall prevention, nutrition, physical activity, Tobacco-use cessation, social engagement, weight loss and cognition.  Checklist reviewing preventive services available has been given to the patient.  Reviewed patient's diet, addressing concerns and/or questions.   The patient was instructed to see the dentist every 6 months.   She is at risk for psychosocial distress and has been provided with information to reduce risk.   The patient reports drinking more than 3 alcoholic drinks per day and/or more than 7 drhnks per week. The patient was counseled and given information about possible harmful effects of excessive alcohol intake.      Subjective   Lizzie is a 64 year old, presenting for the following:  Physical        6/2/2025    12:50 PM   Additional Questions   Roomed by Pat Le MA        Via the Health Maintenance questionnaire, the patient has reported the following services have been completed -Eye Exam: Kamila 2024-07-08-Mammogram: freya 2023-11-06, this information has been sent to the abstraction team.    HPI     Advance Care Planning    Discussed advance care planning with patient; informed AVS " has link to [facility].        5/30/2025   General Health   How would you rate your overall physical health? (!) FAIR   Feel stress (tense, anxious, or unable to sleep) Rather much   (!) STRESS CONCERN      5/30/2025   Nutrition   Three or more servings of calcium each day? Yes   Diet: Regular (no restrictions)    Low salt   How many servings of fruit and vegetables per day? (!) 2-3   How many sweetened beverages each day? 0-1       Multiple values from one day are sorted in reverse-chronological order         5/30/2025   Exercise   Days per week of moderate/strenous exercise 4 days   Average minutes spent exercising at this level 30 min         5/30/2025   Social Factors   Frequency of gathering with friends or relatives Once a week   Worry food won't last until get money to buy more No   Food not last or not have enough money for food? No   Do you have housing? (Housing is defined as stable permanent housing and does not include staying outside in a car, in a tent, in an abandoned building, in an overnight shelter, or couch-surfing.) Yes   Are you worried about losing your housing? No   Lack of transportation? No   Unable to get utilities (heat,electricity)? No         6/2/2025   Fall Risk   Gait Speed Test (Document in seconds) 4.38   Gait Speed Test Interpretation Less than or equal to 5.00 seconds - PASS          5/30/2025   Dental   Dentist two times every year? (!) NO           Today's PHQ-2 Score:       3/6/2025    10:41 AM   PHQ-2 ( 1999 [employer])   Q1: Little interest or pleasure in doing things 1   Q2: Feeling down, depressed or hopeless 1   PHQ-2 Score 2    Q1: Little interest or pleasure in doing things Several days   Q2: Feeling down, depressed or hopeless Several days   PHQ-2 Score 2       Patient-reported         5/30/2025   Substance Use   Alcohol more than 3/day or more than 7/wk Yes   How often do you have a drink containing alcohol 2 to 3 times a week   How many alcohol drinks on typical day  3 or 4   How often do you have 5+ drinks at one occasion Weekly   Audit 2/3 Score 4   How often not able to stop drinking once started Never   How often failed to do what normally expected Never   How often needed first drink in am after a heavy drinking session Never   How often feeling of guilt or remorse after drinking Less than monthly   How often unable to remember what happened the night before Never   Have you or someone else been injured because of your drinking No   Has anyone been concerned or suggested you cut down on drinking No   TOTAL SCORE - AUDIT 8   Do you use any other substances recreationally? (!) DECLINE     Social History     Tobacco Use    Smoking status: Former     Current packs/day: 0.00     Average packs/day: 1 pack/day for 45.0 years (45.0 ttl pk-yrs)     Types: Cigarettes     Start date: 7/3/1975     Quit date: 7/3/2020     Years since quittin.9     Passive exposure: Past    Smokeless tobacco: Never   Vaping Use    Vaping status: Never Used   Substance Use Topics    Alcohol use: Yes     Comment: 6 Drinks per Week    Drug use: Not Currently          Mammogram Screening - Mammogram every 1-2 years updated in Health Maintenance based on mutual decision making        2025   STI Screening   New sexual partner(s) since last STI/HIV test? No     History of abnormal Pap smear: No - age 30- 64 PAP with HPV every 5 years recommended        Latest Ref Rng & Units 2021     5:40 PM 11/3/2016    11:41 AM 11/3/2016    12:00 AM   PAP / HPV   PAP  Negative for Intraepithelial Lesion or Malignancy (NILM)      PAP (Historical)    NIL    HPV 16 DNA Negative Negative  Negative     HPV 18 DNA Negative Negative  Negative     Other HR HPV Negative Negative  Negative       ASCVD Risk   The ASCVD Risk score (Miguel BLACKWELL, et al., 2019) failed to calculate for the following reasons:    Risk score cannot be calculated because patient has a medical history suggesting prior/existing ASCVD          Reviewed and updated as needed this visit by Provider                    Patient Active Problem List   Diagnosis    Former smoker    Flat feet    Family history of coronary artery disease    Fatty liver    Plantar fasciitis    Hypertension goal BP (blood pressure) < 140/90    Ischemic cardiomyopathy    Hyperlipidemia with target LDL less than 100    Benign essential hypertension    Acquired hypothyroidism    Coronary artery disease involving native coronary artery, angina presence unspecified, unspecified whether native or transplanted heart    Lightheadedness    History of non-ST elevation myocardial infarction (NSTEMI)    Type 2 diabetes mellitus with diabetic polyneuropathy, without long-term current use of insulin (H)     Past Surgical History:   Procedure Laterality Date    BIOPSY      CARDIAC SURGERY      stent    COLONOSCOPY N/A 2016    Procedure: COLONOSCOPY;  Surgeon: Ranjith Serrano MD;  Location:  GI    HERNIORRHAPHY VENTRAL  10/08/2013    Procedure: HERNIORRHAPHY VENTRAL;  Ventral hernia repair with mesh;  Surgeon: Alice Cordova MD;  Location:  OR    NO HISTORY OF SURGERY         Social History     Tobacco Use    Smoking status: Former     Current packs/day: 0.00     Average packs/day: 1 pack/day for 45.0 years (45.0 ttl pk-yrs)     Types: Cigarettes     Start date: 7/3/1975     Quit date: 7/3/2020     Years since quittin.9     Passive exposure: Past    Smokeless tobacco: Never   Substance Use Topics    Alcohol use: Yes     Comment: 6 Drinks per Week     Family History   Problem Relation Age of Onset    Lipids Mother         alive 73    C.A.D. Mother 65        at age 65, heart attack, s/p stents placement    Hypertension Mother     Cardiovascular Mother     Coronary Artery Disease Mother     Hyperlipidemia Mother     Depression Mother     Anxiety Disorder Mother     Family History Negative Father         alive 74    Hypertension Father     Cerebrovascular Disease Father      "CHRISTIANO Maternal Grandmother 56        fatal MI    Obesity Maternal Grandmother     Hypertension Maternal Grandfather     Hypertension Paternal Grandfather     Family History Negative Sister     Family History Negative Sister     Family History Negative Brother     Family History Negative Brother         leukemia at age 18    Breast Cancer No family hx of     Cancer - colorectal No family hx of     Diabetes No family hx of              Review of Systems  Constitutional, neuro, ENT, endocrine, pulmonary, cardiac, gastrointestinal, genitourinary, musculoskeletal, integument and psychiatric systems are negative, except as otherwise noted.     Objective    Exam  /85 (BP Location: Right arm, Patient Position: Sitting, Cuff Size: Adult Regular)   Pulse 67   Temp 98.1  F (36.7  C) (Oral)   Resp 16   Ht 1.651 m (5' 5\")   Wt 94.9 kg (209 lb 3.2 oz)   LMP 07/01/2013   SpO2 98%   BMI 34.81 kg/m     Estimated body mass index is 34.81 kg/m  as calculated from the following:    Height as of this encounter: 1.651 m (5' 5\").    Weight as of this encounter: 94.9 kg (209 lb 3.2 oz).    Physical Exam  GENERAL: alert and no distress  EYES: Eyes grossly normal to inspection, PERRL and conjunctivae and sclerae normal  HENT: ear canals and TM's normal, nose and mouth without ulcers or lesions  NECK: no adenopathy, no asymmetry, masses, or scars  RESP: lungs clear to auscultation - no rales, rhonchi or wheezes  CV: regular rate and rhythm, normal S1 S2, no S3 or S4, no murmur, click or rub, no peripheral edema  ABDOMEN: soft, nontender, no hepatosplenomegaly, no masses and bowel sounds normal  MS: no gross musculoskeletal defects noted, no edema  SKIN: no suspicious lesions or rashes  NEURO: Normal strength and tone, mentation intact and speech normal  PSYCH: mentation appears normal, affect normal/bright  Gait and balance assessed per Gait Speed Test.  Result as above.        Signed Electronically by: Gladys Solis, " MD

## 2025-06-02 NOTE — TELEPHONE ENCOUNTER
Pharmacy calling for clarification on med Levothyroxine 25 mg.Sig says: Take 3 days of the week     Directions need to say how many tablets to take 3 days a week, says they can not assume it is one.     Please send new Rx or call back.     Nitza Hernandez R.N.

## 2025-06-03 ENCOUNTER — PATIENT OUTREACH (OUTPATIENT)
Dept: CARE COORDINATION | Facility: CLINIC | Age: 65
End: 2025-06-03
Payer: COMMERCIAL

## 2025-06-03 ENCOUNTER — RESULTS FOLLOW-UP (OUTPATIENT)
Dept: FAMILY MEDICINE | Facility: CLINIC | Age: 65
End: 2025-06-03

## 2025-06-11 ENCOUNTER — OFFICE VISIT (OUTPATIENT)
Dept: NEUROLOGY | Facility: CLINIC | Age: 65
End: 2025-06-11
Payer: COMMERCIAL

## 2025-06-11 VITALS — SYSTOLIC BLOOD PRESSURE: 150 MMHG | OXYGEN SATURATION: 95 % | HEART RATE: 71 BPM | DIASTOLIC BLOOD PRESSURE: 83 MMHG

## 2025-06-11 DIAGNOSIS — G62.9 SMALL FIBER NEUROPATHY: Primary | ICD-10-CM

## 2025-06-11 PROCEDURE — 3077F SYST BP >= 140 MM HG: CPT | Performed by: PSYCHIATRY & NEUROLOGY

## 2025-06-11 PROCEDURE — 3079F DIAST BP 80-89 MM HG: CPT | Performed by: PSYCHIATRY & NEUROLOGY

## 2025-06-11 PROCEDURE — 99213 OFFICE O/P EST LOW 20 MIN: CPT | Performed by: PSYCHIATRY & NEUROLOGY

## 2025-06-11 NOTE — PROGRESS NOTES
ESTABLISHED PATIENT NEUROLOGY NOTE    DATE OF VISIT: 6/11/2025  CLINIC LOCATION: [facility]  MRN: [id number]  PATIENT NAME: Lizzie Chow  YOB: 1960    REASON FOR VISIT: No chief complaint on file.    SUBJECTIVE:                                                      HISTORY OF PRESENT ILLNESS: Patient is here to follow up regarding small fiber peripheral polyneuropathy.  She was last seen on 5/6/2025.  At that time additional lab workup was ordered.  Please refer to my initial/other prior notes for further information.    Since the last visit, the patient reports ***.  She denies interval development of new focal neurological symptoms.    Her hemoglobin A1C came back at 6.3, while methylmalonic acid, B1, B12 (631), B6 (24.6), vitamin E levels were normal, so as sed rate.  Urine/serum electrophoresis/immunofixation was negative, so was GLEN.  EXAM:                                                    Physical Exam:   Vitals: [facility] 07/01/2013     General: pt is in NAD, cooperative.  Skin: normal turgor, moist mucous membranes, no lesions/rashes noticed.  HEENT: ATNC, white sclera, normal conjunctiva.  Respiratory: Symmetric lung excursion, no accessory respiratory muscle use.  Abdomen: Non distended.  Neurological: awake, cooperative, follows commands, no exam changes compared to previous visits.  ASSESSMENT AND PLAN:                                                    Assessment: 64 year old female patient presents for follow-up of small fiber peripheral polyneuropathy after completion of additional lab workup, which was essentially negative, except hemoglobin A1C in prediabetes range.  We discussed that this is most likely etiology of her symptoms.  We also reviewed treatment options, including alpha lipoic acid and prescription medications to treat neuropathic pain.  We decided ***.    Diagnoses:    ICD-10-CM    1. Small fiber neuropathy  G62.9         Plan:  There are no Patient  Instructions on file for this visit.    Total Time: *** minutes spent on the date of the encounter doing chart review, history and exam, documentation and further activities per the note.    Jonny Perez MD  [facility]  (Chart documentation was completed in part with Dragon voice-recognition software. Even though reviewed, some grammatical, spelling, and word errors may remain.)

## 2025-06-11 NOTE — LETTER
6/11/2025      Lizzie Chow  [address] [address] [place] [id number]      Dear Colleague,    Thank you for referring your patient, Lizzie Chow, to the [facility]UR[facility]. Please see a copy of my visit note below.    ESTABLISHED PATIENT NEUROLOGY NOTE    DATE OF VISIT: 6/11/2025  CLINIC LOCATION: [facility]  MRN: [id number]  PATIENT NAME: Lizzie Chow  YOB: 1960    REASON FOR VISIT:   Chief Complaint   Patient presents with     Follow Up     SUBJECTIVE:                                                      HISTORY OF PRESENT ILLNESS: Patient is here to follow up regarding small fiber peripheral polyneuropathy.  She was last seen on 5/6/2025.  At that time additional lab workup was ordered.  Please refer to my initial/other prior notes for further information.    Since the last visit, the patient reports no changes.  She denies interval development of new focal neurological symptoms.    Her hemoglobin A1C came back at 6.3, while methylmalonic acid, B1, B12 (631), B6 (24.6), vitamin E levels were normal, so as sed rate.  Urine/serum electrophoresis/immunofixation was negative, so was GLEN.  EXAM:                                                    Physical Exam:   Vitals: BP (!) 162/94   Pulse 71   LMP 07/01/2013   SpO2 95%     General: pt is in NAD, cooperative.  Skin: normal turgor, moist mucous membranes, no lesions/rashes noticed.  HEENT: ATNC, white sclera, normal conjunctiva.  Respiratory: Symmetric lung excursion, no accessory respiratory muscle use.  Abdomen: Non distended.  Neurological: awake, cooperative, follows commands, no exam changes compared to previous visits.  ASSESSMENT AND PLAN:                                                    Assessment: 64 year old female patient presents for follow-up of small fiber peripheral polyneuropathy after completion of additional lab workup, which was essentially negative, except hemoglobin A1C in  prediabetes range.  We discussed that this is most likely etiology of her symptoms.  We also reviewed treatment options, including alpha lipoic acid and prescription medications to treat neuropathic pain.  We decided to start with alpha lipoic acid and add gabapentin at the follow-up visit if still necessary.    Lizzie to follow up with Primary Care provider regarding elevated blood pressure.    Diagnoses:    ICD-10-CM    1. Small fiber neuropathy  G62.9         Plan: At today's visit we thoroughly discussed current symptoms, evaluation results, diagnosis, available treatment options, and the plan.    We decided to try alpha lipoic acid at 600 mg/day.  I recommended the patient to take 300 mg twice daily with meals to avoid stomach irritation and let me know if she experiences any intolerable side effects.    Next follow-up appointment is in the next 4 months or earlier if needed.    Total Time: 17 minutes spent on the date of the encounter doing chart review, history and exam, documentation and further activities per the note.    Jonny Perez MD  [facility]rology  (Chart documentation was completed in part with Dragon voice-recognition software. Even though reviewed, some grammatical, spelling, and word errors may remain.)    Again, thank you for allowing me to participate in the care of your patient.        Sincerely,        Jonny Perez MD    Electronically signed

## 2025-06-11 NOTE — PATIENT INSTRUCTIONS
AFTER VISIT SUMMARY (AVS):    At today's visit we thoroughly discussed current symptoms, evaluation results, diagnosis, available treatment options, and the plan.    We decided to try alpha lipoic acid at 600 mg/day.  You could take 300 mg twice daily with meals to avoid stomach irritation and let me know if you experience any intolerable side effects.    Next follow-up appointment is in the next 4 months or earlier if needed.    Please do not hesitate to call me with any questions or concerns.    Thanks.

## 2025-06-11 NOTE — PROGRESS NOTES
ESTABLISHED PATIENT NEUROLOGY NOTE    DATE OF VISIT: 6/11/2025  CLINIC LOCATION: [facility]  MRN: [id number]  PATIENT NAME: Lizzie Chow  YOB: 1960    REASON FOR VISIT:   Chief Complaint   Patient presents with    Follow Up     SUBJECTIVE:                                                      HISTORY OF PRESENT ILLNESS: Patient is here to follow up regarding small fiber peripheral polyneuropathy.  She was last seen on 5/6/2025.  At that time additional lab workup was ordered.  Please refer to my initial/other prior notes for further information.    Since the last visit, the patient reports no changes.  She denies interval development of new focal neurological symptoms.    Her hemoglobin A1C came back at 6.3, while methylmalonic acid, B1, B12 (631), B6 (24.6), vitamin E levels were normal, so as sed rate.  Urine/serum electrophoresis/immunofixation was negative, so was GLEN.  EXAM:                                                    Physical Exam:   Vitals: BP (!) 162/94   Pulse 71   LMP 07/01/2013   SpO2 95%     General: pt is in NAD, cooperative.  Skin: normal turgor, moist mucous membranes, no lesions/rashes noticed.  HEENT: ATNC, white sclera, normal conjunctiva.  Respiratory: Symmetric lung excursion, no accessory respiratory muscle use.  Abdomen: Non distended.  Neurological: awake, cooperative, follows commands, no exam changes compared to previous visits.  ASSESSMENT AND PLAN:                                                    Assessment: 64 year old female patient presents for follow-up of small fiber peripheral polyneuropathy after completion of additional lab workup, which was essentially negative, except hemoglobin A1C in prediabetes range.  We discussed that this is most likely etiology of her symptoms.  We also reviewed treatment options, including alpha lipoic acid and prescription medications to treat neuropathic pain.  We decided to start with alpha lipoic acid and  add gabapentin at the follow-up visit if still necessary.    Lizzie to follow up with Primary Care provider regarding elevated blood pressure.    Diagnoses:    ICD-10-CM    1. Small fiber neuropathy  G62.9         Plan: At today's visit we thoroughly discussed current symptoms, evaluation results, diagnosis, available treatment options, and the plan.    We decided to try alpha lipoic acid at 600 mg/day.  I recommended the patient to take 300 mg twice daily with meals to avoid stomach irritation and let me know if she experiences any intolerable side effects.    Next follow-up appointment is in the next 4 months or earlier if needed.    Total Time: 17 minutes spent on the date of the encounter doing chart review, history and exam, documentation and further activities per the note.    Jonny Perez MD  [facility]  (Chart documentation was completed in part with Dragon voice-recognition software. Even though reviewed, some grammatical, spelling, and word errors may remain.)

## 2025-07-12 ENCOUNTER — ANCILLARY PROCEDURE (OUTPATIENT)
Dept: MAMMOGRAPHY | Facility: CLINIC | Age: 65
End: 2025-07-12
Payer: COMMERCIAL

## 2025-07-12 ENCOUNTER — HOSPITAL ENCOUNTER (OUTPATIENT)
Dept: CT IMAGING | Facility: CLINIC | Age: 65
Discharge: HOME | End: 2025-07-12
Attending: FAMILY MEDICINE | Admitting: FAMILY MEDICINE
Payer: COMMERCIAL

## 2025-07-12 DIAGNOSIS — Z87.891 HISTORY OF TOBACCO USE, PRESENTING HAZARDS TO HEALTH: ICD-10-CM

## 2025-07-12 DIAGNOSIS — Z12.31 ENCOUNTER FOR SCREENING MAMMOGRAM FOR BREAST CANCER: ICD-10-CM

## 2025-07-12 PROCEDURE — 77063 BREAST TOMOSYNTHESIS BI: CPT | Mod: TC | Performed by: RADIOLOGY

## 2025-07-12 PROCEDURE — 71271 CT THORAX LUNG CANCER SCR C-: CPT

## 2025-07-12 PROCEDURE — 77067 SCR MAMMO BI INCL CAD: CPT | Mod: TC | Performed by: RADIOLOGY
